# Patient Record
Sex: FEMALE | Race: WHITE | Employment: PART TIME | URBAN - METROPOLITAN AREA
[De-identification: names, ages, dates, MRNs, and addresses within clinical notes are randomized per-mention and may not be internally consistent; named-entity substitution may affect disease eponyms.]

---

## 2017-01-16 PROBLEM — R31.0 GROSS HEMATURIA: Status: ACTIVE | Noted: 2017-01-16

## 2017-01-16 PROBLEM — D49.4 BLADDER TUMOR: Status: ACTIVE | Noted: 2017-01-16

## 2017-02-23 PROBLEM — C67.2 MALIGNANT NEOPLASM OF LATERAL WALL OF URINARY BLADDER (HCC): Status: ACTIVE | Noted: 2017-02-23

## 2017-02-24 RX ORDER — LISINOPRIL 10 MG/1
10 TABLET ORAL 2 TIMES DAILY
COMMUNITY

## 2017-02-27 ENCOUNTER — ANESTHESIA EVENT (OUTPATIENT)
Dept: OPERATING ROOM | Age: 67
DRG: 658 | End: 2017-02-27
Payer: COMMERCIAL

## 2017-02-27 ENCOUNTER — ANESTHESIA (OUTPATIENT)
Dept: OPERATING ROOM | Age: 67
DRG: 658 | End: 2017-02-27
Payer: COMMERCIAL

## 2017-02-27 ENCOUNTER — HOSPITAL ENCOUNTER (INPATIENT)
Age: 67
LOS: 1 days | Discharge: HOME | DRG: 658 | End: 2017-02-28
Attending: UROLOGY | Admitting: UROLOGY
Payer: COMMERCIAL

## 2017-02-27 VITALS — SYSTOLIC BLOOD PRESSURE: 138 MMHG | DIASTOLIC BLOOD PRESSURE: 115 MMHG | OXYGEN SATURATION: 100 % | TEMPERATURE: 95.9 F

## 2017-02-27 LAB
ABO/RH: NORMAL
ALLEN TEST: ABNORMAL
ANION GAP SERPL CALCULATED.3IONS-SCNC: 13 MMOL/L (ref 9–17)
ANION GAP: 16 MMOL/L (ref 8–16)
ANTIBODY SCREEN: NEGATIVE
ARM BAND NUMBER: NORMAL
BUN BLDV-MCNC: 11 MG/DL (ref 8–23)
BUN/CREAT BLD: ABNORMAL (ref 9–20)
CALCIUM IONIZED: 1.35 MMOL/L (ref 1.13–1.33)
CALCIUM SERPL-MCNC: 9.3 MG/DL (ref 8.6–10.4)
CARBOXYHEMOGLOBIN: 1.2 % (ref 0–5)
CHLORIDE BLD-SCNC: 100 MMOL/L (ref 98–107)
CHLORIDE, WHOLE BLOOD: 106 MMOL/L (ref 98–110)
CO2: 23 MMOL/L (ref 20–31)
CREAT SERPL-MCNC: 0.73 MG/DL (ref 0.5–0.9)
EXPIRATION DATE: NORMAL
FIO2: ABNORMAL
GFR AFRICAN AMERICAN: >60 ML/MIN
GFR NON-AFRICAN AMERICAN: >60 ML/MIN
GFR SERPL CREATININE-BSD FRML MDRD: ABNORMAL ML/MIN/{1.73_M2}
GFR SERPL CREATININE-BSD FRML MDRD: ABNORMAL ML/MIN/{1.73_M2}
GLUCOSE BLD-MCNC: 133 MG/DL (ref 74–106)
GLUCOSE BLD-MCNC: 156 MG/DL (ref 65–105)
GLUCOSE BLD-MCNC: 186 MG/DL (ref 65–105)
GLUCOSE BLD-MCNC: 201 MG/DL (ref 70–99)
HCO3 ARTERIAL: 21.2 MMOL/L (ref 22–27)
HCT VFR BLD CALC: 33.4 %
HCT VFR BLD CALC: 35.1 % (ref 36–46)
HEMOGLOBIN: 10.8 GM/DL
HEMOGLOBIN: 11.9 G/DL (ref 12–16)
METHEMOGLOBIN: ABNORMAL % (ref 0–1.5)
MODE: ABNORMAL
NEGATIVE BASE EXCESS, ART: 2.3 MMOL/L (ref 0–2)
NOTIFICATION TIME: ABNORMAL
NOTIFICATION: ABNORMAL
O2 DEVICE/FLOW/%: ABNORMAL
O2 SAT, ARTERIAL: 99.5 % (ref 94–100)
OXYHEMOGLOBIN: ABNORMAL % (ref 95–98)
PATIENT TEMP: 37
PCO2 ARTERIAL: 33.8 MMHG (ref 32–45)
PCO2, ART, TEMP ADJ: ABNORMAL (ref 32–45)
PEEP/CPAP: ABNORMAL
PH ARTERIAL: 7.42 (ref 7.35–7.45)
PH, ART, TEMP ADJ: ABNORMAL (ref 7.35–7.45)
PO2 ARTERIAL: 186 MMHG (ref 75–95)
PO2, ART, TEMP ADJ: ABNORMAL MMHG (ref 75–95)
POC BUN: 13 MG/DL (ref 6–20)
POC CHLORIDE: 107 MMOL/L (ref 98–110)
POC CREATININE: 0.7 MG/DL (ref 0.6–1.4)
POC HEMATOCRIT: 43 % (ref 36–46)
POC HEMOGLOBIN: 14.6 GM/DL (ref 12–16)
POC POTASSIUM: 3.9 MMOL/L (ref 3.5–5.1)
POC SODIUM: 142 MMOL/L (ref 136–145)
POC TCO2: 23 MMOL/L (ref 20–31)
POSITIVE BASE EXCESS, ART: ABNORMAL MMOL/L (ref 0–2)
POTASSIUM SERPL-SCNC: 3.8 MMOL/L (ref 3.7–5.3)
POTASSIUM, WHOLE BLOOD: 4 MMOL/L (ref 3.6–5)
PSV: ABNORMAL
PT. POSITION: ABNORMAL
RESPIRATORY RATE: ABNORMAL
SAMPLE SITE: ABNORMAL
SET RATE: ABNORMAL
SODIUM BLD-SCNC: 136 MMOL/L (ref 135–144)
SODIUM, WHOLE BLOOD: 140 MMOL/L (ref 136–145)
TEXT FOR RESPIRATORY: ABNORMAL
TOTAL HB: ABNORMAL G/DL (ref 12–16)
TOTAL RATE: ABNORMAL
VT: ABNORMAL

## 2017-02-27 PROCEDURE — 6360000002 HC RX W HCPCS: Performed by: NURSE ANESTHETIST, CERTIFIED REGISTERED

## 2017-02-27 PROCEDURE — 3700000001 HC ADD 15 MINUTES (ANESTHESIA): Performed by: UROLOGY

## 2017-02-27 PROCEDURE — 7100000001 HC PACU RECOVERY - ADDTL 15 MIN: Performed by: UROLOGY

## 2017-02-27 PROCEDURE — 3700000000 HC ANESTHESIA ATTENDED CARE: Performed by: UROLOGY

## 2017-02-27 PROCEDURE — 2500000003 HC RX 250 WO HCPCS: Performed by: NURSE ANESTHETIST, CERTIFIED REGISTERED

## 2017-02-27 PROCEDURE — 85014 HEMATOCRIT: CPT

## 2017-02-27 PROCEDURE — 2580000003 HC RX 258: Performed by: UROLOGY

## 2017-02-27 PROCEDURE — 88307 TISSUE EXAM BY PATHOLOGIST: CPT

## 2017-02-27 PROCEDURE — 80048 BASIC METABOLIC PNL TOTAL CA: CPT

## 2017-02-27 PROCEDURE — 87086 URINE CULTURE/COLONY COUNT: CPT

## 2017-02-27 PROCEDURE — 85018 HEMOGLOBIN: CPT

## 2017-02-27 PROCEDURE — 2580000003 HC RX 258: Performed by: NURSE ANESTHETIST, CERTIFIED REGISTERED

## 2017-02-27 PROCEDURE — 2500000003 HC RX 250 WO HCPCS

## 2017-02-27 PROCEDURE — 84132 ASSAY OF SERUM POTASSIUM: CPT

## 2017-02-27 PROCEDURE — 86900 BLOOD TYPING SEROLOGIC ABO: CPT

## 2017-02-27 PROCEDURE — 86850 RBC ANTIBODY SCREEN: CPT

## 2017-02-27 PROCEDURE — 88313 SPECIAL STAINS GROUP 2: CPT

## 2017-02-27 PROCEDURE — 3600000009 HC SURGERY ROBOT BASE: Performed by: UROLOGY

## 2017-02-27 PROCEDURE — 6360000002 HC RX W HCPCS: Performed by: UROLOGY

## 2017-02-27 PROCEDURE — 6360000002 HC RX W HCPCS

## 2017-02-27 PROCEDURE — 3600000019 HC SURGERY ROBOT ADDTL 15MIN: Performed by: UROLOGY

## 2017-02-27 PROCEDURE — 6360000002 HC RX W HCPCS: Performed by: ANESTHESIOLOGY

## 2017-02-27 PROCEDURE — 88331 PATH CONSLTJ SURG 1 BLK 1SPC: CPT

## 2017-02-27 PROCEDURE — 84520 ASSAY OF UREA NITROGEN: CPT

## 2017-02-27 PROCEDURE — 1200000000 HC SEMI PRIVATE

## 2017-02-27 PROCEDURE — 2720000003 HC MISC SUTURE/STAPLES/RELOADS/ETC: Performed by: UROLOGY

## 2017-02-27 PROCEDURE — 82947 ASSAY GLUCOSE BLOOD QUANT: CPT

## 2017-02-27 PROCEDURE — 82330 ASSAY OF CALCIUM: CPT

## 2017-02-27 PROCEDURE — 80051 ELECTROLYTE PANEL: CPT

## 2017-02-27 PROCEDURE — 6370000000 HC RX 637 (ALT 250 FOR IP): Performed by: UROLOGY

## 2017-02-27 PROCEDURE — 86901 BLOOD TYPING SEROLOGIC RH(D): CPT

## 2017-02-27 PROCEDURE — 7100000000 HC PACU RECOVERY - FIRST 15 MIN: Performed by: UROLOGY

## 2017-02-27 PROCEDURE — 82805 BLOOD GASES W/O2 SATURATION: CPT

## 2017-02-27 PROCEDURE — 82565 ASSAY OF CREATININE: CPT

## 2017-02-27 PROCEDURE — 2580000003 HC RX 258: Performed by: ANESTHESIOLOGY

## 2017-02-27 PROCEDURE — 2500000003 HC RX 250 WO HCPCS: Performed by: UROLOGY

## 2017-02-27 PROCEDURE — 2780000010 HC IMPLANT OTHER: Performed by: UROLOGY

## 2017-02-27 RX ORDER — SODIUM CHLORIDE 0.9 % (FLUSH) 0.9 %
10 SYRINGE (ML) INJECTION PRN
Status: DISCONTINUED | OUTPATIENT
Start: 2017-02-27 | End: 2017-02-28 | Stop reason: HOSPADM

## 2017-02-27 RX ORDER — LISINOPRIL 10 MG/1
10 TABLET ORAL 2 TIMES DAILY
Status: DISCONTINUED | OUTPATIENT
Start: 2017-02-27 | End: 2017-02-28 | Stop reason: HOSPADM

## 2017-02-27 RX ORDER — PROPOFOL 10 MG/ML
INJECTION, EMULSION INTRAVENOUS PRN
Status: DISCONTINUED | OUTPATIENT
Start: 2017-02-27 | End: 2017-02-27 | Stop reason: SDUPTHER

## 2017-02-27 RX ORDER — OXYCODONE HYDROCHLORIDE AND ACETAMINOPHEN 5; 325 MG/1; MG/1
2 TABLET ORAL EVERY 4 HOURS PRN
Status: DISCONTINUED | OUTPATIENT
Start: 2017-02-27 | End: 2017-02-28 | Stop reason: HOSPADM

## 2017-02-27 RX ORDER — MORPHINE SULFATE 4 MG/ML
4 INJECTION, SOLUTION INTRAMUSCULAR; INTRAVENOUS
Status: DISCONTINUED | OUTPATIENT
Start: 2017-02-27 | End: 2017-02-28 | Stop reason: HOSPADM

## 2017-02-27 RX ORDER — WATER 1000 ML/1000ML
INJECTION, SOLUTION INTRAVENOUS PRN
Status: DISCONTINUED | OUTPATIENT
Start: 2017-02-27 | End: 2017-02-27 | Stop reason: HOSPADM

## 2017-02-27 RX ORDER — OXYCODONE HYDROCHLORIDE AND ACETAMINOPHEN 5; 325 MG/1; MG/1
1 TABLET ORAL EVERY 4 HOURS PRN
Status: DISCONTINUED | OUTPATIENT
Start: 2017-02-27 | End: 2017-02-28 | Stop reason: HOSPADM

## 2017-02-27 RX ORDER — ACETAMINOPHEN 325 MG/1
650 TABLET ORAL EVERY 4 HOURS PRN
Status: DISCONTINUED | OUTPATIENT
Start: 2017-02-27 | End: 2017-02-28 | Stop reason: HOSPADM

## 2017-02-27 RX ORDER — SODIUM CHLORIDE, SODIUM LACTATE, POTASSIUM CHLORIDE, CALCIUM CHLORIDE 600; 310; 30; 20 MG/100ML; MG/100ML; MG/100ML; MG/100ML
INJECTION, SOLUTION INTRAVENOUS CONTINUOUS PRN
Status: DISCONTINUED | OUTPATIENT
Start: 2017-02-27 | End: 2017-02-27 | Stop reason: SDUPTHER

## 2017-02-27 RX ORDER — SODIUM CHLORIDE, SODIUM LACTATE, POTASSIUM CHLORIDE, CALCIUM CHLORIDE 600; 310; 30; 20 MG/100ML; MG/100ML; MG/100ML; MG/100ML
INJECTION, SOLUTION INTRAVENOUS CONTINUOUS
Status: DISCONTINUED | OUTPATIENT
Start: 2017-02-27 | End: 2017-02-27

## 2017-02-27 RX ORDER — ONDANSETRON 2 MG/ML
INJECTION INTRAMUSCULAR; INTRAVENOUS PRN
Status: DISCONTINUED | OUTPATIENT
Start: 2017-02-27 | End: 2017-02-27 | Stop reason: SDUPTHER

## 2017-02-27 RX ORDER — MIDAZOLAM HYDROCHLORIDE 1 MG/ML
2 INJECTION INTRAMUSCULAR; INTRAVENOUS ONCE
Status: COMPLETED | OUTPATIENT
Start: 2017-02-27 | End: 2017-02-27

## 2017-02-27 RX ORDER — MORPHINE SULFATE 2 MG/ML
2 INJECTION, SOLUTION INTRAMUSCULAR; INTRAVENOUS
Status: DISCONTINUED | OUTPATIENT
Start: 2017-02-27 | End: 2017-02-28 | Stop reason: HOSPADM

## 2017-02-27 RX ORDER — FAMOTIDINE 20 MG/1
20 TABLET, FILM COATED ORAL 2 TIMES DAILY
Status: DISCONTINUED | OUTPATIENT
Start: 2017-02-27 | End: 2017-02-28 | Stop reason: HOSPADM

## 2017-02-27 RX ORDER — ROCURONIUM BROMIDE 10 MG/ML
INJECTION, SOLUTION INTRAVENOUS PRN
Status: DISCONTINUED | OUTPATIENT
Start: 2017-02-27 | End: 2017-02-27 | Stop reason: SDUPTHER

## 2017-02-27 RX ORDER — MIDAZOLAM HYDROCHLORIDE 1 MG/ML
INJECTION INTRAMUSCULAR; INTRAVENOUS
Status: COMPLETED
Start: 2017-02-27 | End: 2017-02-27

## 2017-02-27 RX ORDER — HYDRALAZINE HYDROCHLORIDE 20 MG/ML
10 INJECTION INTRAMUSCULAR; INTRAVENOUS EVERY 6 HOURS PRN
Status: DISCONTINUED | OUTPATIENT
Start: 2017-02-27 | End: 2017-02-28 | Stop reason: HOSPADM

## 2017-02-27 RX ORDER — LIDOCAINE HYDROCHLORIDE 10 MG/ML
INJECTION, SOLUTION EPIDURAL; INFILTRATION; INTRACAUDAL; PERINEURAL PRN
Status: DISCONTINUED | OUTPATIENT
Start: 2017-02-27 | End: 2017-02-27 | Stop reason: SDUPTHER

## 2017-02-27 RX ORDER — GLYCOPYRROLATE 0.2 MG/ML
INJECTION INTRAMUSCULAR; INTRAVENOUS PRN
Status: DISCONTINUED | OUTPATIENT
Start: 2017-02-27 | End: 2017-02-27 | Stop reason: SDUPTHER

## 2017-02-27 RX ORDER — LABETALOL HYDROCHLORIDE 5 MG/ML
INJECTION, SOLUTION INTRAVENOUS PRN
Status: DISCONTINUED | OUTPATIENT
Start: 2017-02-27 | End: 2017-02-27 | Stop reason: SDUPTHER

## 2017-02-27 RX ORDER — SODIUM CHLORIDE 9 MG/ML
INJECTION, SOLUTION INTRAVENOUS CONTINUOUS
Status: DISCONTINUED | OUTPATIENT
Start: 2017-02-27 | End: 2017-02-28

## 2017-02-27 RX ORDER — SODIUM CHLORIDE 0.9 % (FLUSH) 0.9 %
10 SYRINGE (ML) INJECTION EVERY 12 HOURS SCHEDULED
Status: DISCONTINUED | OUTPATIENT
Start: 2017-02-27 | End: 2017-02-28 | Stop reason: HOSPADM

## 2017-02-27 RX ORDER — FENTANYL CITRATE 50 UG/ML
INJECTION, SOLUTION INTRAMUSCULAR; INTRAVENOUS PRN
Status: DISCONTINUED | OUTPATIENT
Start: 2017-02-27 | End: 2017-02-27 | Stop reason: SDUPTHER

## 2017-02-27 RX ORDER — UREA 10 %
10 LOTION (ML) TOPICAL NIGHTLY
Status: DISCONTINUED | OUTPATIENT
Start: 2017-02-27 | End: 2017-02-28 | Stop reason: HOSPADM

## 2017-02-27 RX ORDER — DEXAMETHASONE SODIUM PHOSPHATE 10 MG/ML
INJECTION INTRAMUSCULAR; INTRAVENOUS PRN
Status: DISCONTINUED | OUTPATIENT
Start: 2017-02-27 | End: 2017-02-27 | Stop reason: SDUPTHER

## 2017-02-27 RX ORDER — ONDANSETRON 2 MG/ML
4 INJECTION INTRAMUSCULAR; INTRAVENOUS EVERY 6 HOURS PRN
Status: DISCONTINUED | OUTPATIENT
Start: 2017-02-27 | End: 2017-02-28 | Stop reason: HOSPADM

## 2017-02-27 RX ORDER — DOCUSATE SODIUM 100 MG/1
100 CAPSULE, LIQUID FILLED ORAL 2 TIMES DAILY
Status: DISCONTINUED | OUTPATIENT
Start: 2017-02-27 | End: 2017-02-28 | Stop reason: HOSPADM

## 2017-02-27 RX ADMIN — FENTANYL CITRATE 50 MCG: 50 INJECTION INTRAMUSCULAR; INTRAVENOUS at 11:35

## 2017-02-27 RX ADMIN — SODIUM CHLORIDE, POTASSIUM CHLORIDE, SODIUM LACTATE AND CALCIUM CHLORIDE: 600; 310; 30; 20 INJECTION, SOLUTION INTRAVENOUS at 07:44

## 2017-02-27 RX ADMIN — LABETALOL HYDROCHLORIDE 5 MG: 5 INJECTION, SOLUTION INTRAVENOUS at 08:47

## 2017-02-27 RX ADMIN — SODIUM CHLORIDE: 9 INJECTION, SOLUTION INTRAVENOUS at 15:15

## 2017-02-27 RX ADMIN — GLYCOPYRROLATE 0.2 MG: 0.2 INJECTION INTRAMUSCULAR; INTRAVENOUS at 08:26

## 2017-02-27 RX ADMIN — PHENYLEPHRINE HYDROCHLORIDE 100 MCG: 10 INJECTION INTRAMUSCULAR; INTRAVENOUS; SUBCUTANEOUS at 08:26

## 2017-02-27 RX ADMIN — LISINOPRIL 10 MG: 10 TABLET ORAL at 20:46

## 2017-02-27 RX ADMIN — MIDAZOLAM HYDROCHLORIDE 2 MG: 1 INJECTION, SOLUTION INTRAMUSCULAR; INTRAVENOUS at 07:52

## 2017-02-27 RX ADMIN — SODIUM CHLORIDE, POTASSIUM CHLORIDE, SODIUM LACTATE AND CALCIUM CHLORIDE: 600; 310; 30; 20 INJECTION, SOLUTION INTRAVENOUS at 09:30

## 2017-02-27 RX ADMIN — MIDAZOLAM HYDROCHLORIDE 2 MG: 1 INJECTION INTRAMUSCULAR; INTRAVENOUS at 07:52

## 2017-02-27 RX ADMIN — ROCURONIUM BROMIDE 20 MG: 10 INJECTION, SOLUTION INTRAVENOUS at 12:00

## 2017-02-27 RX ADMIN — ROCURONIUM BROMIDE 10 MG: 10 INJECTION, SOLUTION INTRAVENOUS at 10:27

## 2017-02-27 RX ADMIN — LIDOCAINE HYDROCHLORIDE 50 MG: 10 INJECTION, SOLUTION EPIDURAL; INFILTRATION; INTRACAUDAL; PERINEURAL at 08:05

## 2017-02-27 RX ADMIN — ROCURONIUM BROMIDE 50 MG: 10 INJECTION, SOLUTION INTRAVENOUS at 08:05

## 2017-02-27 RX ADMIN — ROCURONIUM BROMIDE 20 MG: 10 INJECTION, SOLUTION INTRAVENOUS at 09:04

## 2017-02-27 RX ADMIN — Medication 2 G: at 08:18

## 2017-02-27 RX ADMIN — ROCURONIUM BROMIDE 10 MG: 10 INJECTION, SOLUTION INTRAVENOUS at 09:40

## 2017-02-27 RX ADMIN — FENTANYL CITRATE 100 MCG: 50 INJECTION INTRAMUSCULAR; INTRAVENOUS at 10:37

## 2017-02-27 RX ADMIN — PROPOFOL 50 MG: 10 INJECTION, EMULSION INTRAVENOUS at 08:08

## 2017-02-27 RX ADMIN — PHENYLEPHRINE HYDROCHLORIDE 100 MCG: 10 INJECTION INTRAMUSCULAR; INTRAVENOUS; SUBCUTANEOUS at 08:35

## 2017-02-27 RX ADMIN — ONDANSETRON 4 MG: 2 INJECTION, SOLUTION INTRAMUSCULAR; INTRAVENOUS at 18:22

## 2017-02-27 RX ADMIN — Medication 2 G: at 12:18

## 2017-02-27 RX ADMIN — SODIUM CHLORIDE, POTASSIUM CHLORIDE, SODIUM LACTATE AND CALCIUM CHLORIDE: 600; 310; 30; 20 INJECTION, SOLUTION INTRAVENOUS at 11:20

## 2017-02-27 RX ADMIN — FENTANYL CITRATE 150 MCG: 50 INJECTION INTRAMUSCULAR; INTRAVENOUS at 08:12

## 2017-02-27 RX ADMIN — GLYCOPYRROLATE 0.5 MG: 0.2 INJECTION INTRAMUSCULAR; INTRAVENOUS at 13:03

## 2017-02-27 RX ADMIN — FENTANYL CITRATE 100 MCG: 50 INJECTION INTRAMUSCULAR; INTRAVENOUS at 08:05

## 2017-02-27 RX ADMIN — LABETALOL HYDROCHLORIDE 5 MG: 5 INJECTION, SOLUTION INTRAVENOUS at 08:49

## 2017-02-27 RX ADMIN — DEXAMETHASONE SODIUM PHOSPHATE 10 MG: 10 INJECTION INTRAMUSCULAR; INTRAVENOUS at 08:45

## 2017-02-27 RX ADMIN — SODIUM CHLORIDE, POTASSIUM CHLORIDE, SODIUM LACTATE AND CALCIUM CHLORIDE: 600; 310; 30; 20 INJECTION, SOLUTION INTRAVENOUS at 10:15

## 2017-02-27 RX ADMIN — LABETALOL HYDROCHLORIDE 5 MG: 5 INJECTION, SOLUTION INTRAVENOUS at 11:45

## 2017-02-27 RX ADMIN — MORPHINE SULFATE 2 MG: 2 INJECTION, SOLUTION INTRAMUSCULAR; INTRAVENOUS at 18:33

## 2017-02-27 RX ADMIN — LABETALOL HYDROCHLORIDE 5 MG: 5 INJECTION, SOLUTION INTRAVENOUS at 11:40

## 2017-02-27 RX ADMIN — ENOXAPARIN SODIUM 40 MG: 40 INJECTION SUBCUTANEOUS at 07:08

## 2017-02-27 RX ADMIN — ONDANSETRON 4 MG: 2 INJECTION, SOLUTION INTRAMUSCULAR; INTRAVENOUS at 12:56

## 2017-02-27 RX ADMIN — PROPOFOL 150 MG: 10 INJECTION, EMULSION INTRAVENOUS at 08:05

## 2017-02-27 RX ADMIN — SODIUM CHLORIDE, POTASSIUM CHLORIDE, SODIUM LACTATE AND CALCIUM CHLORIDE: 600; 310; 30; 20 INJECTION, SOLUTION INTRAVENOUS at 08:20

## 2017-02-27 RX ADMIN — GLYCOPYRROLATE 0.2 MG: 0.2 INJECTION INTRAMUSCULAR; INTRAVENOUS at 08:45

## 2017-02-27 RX ADMIN — Medication 2 G: at 20:46

## 2017-02-27 RX ADMIN — HYDRALAZINE HYDROCHLORIDE 10 MG: 20 INJECTION INTRAMUSCULAR; INTRAVENOUS at 18:33

## 2017-02-27 RX ADMIN — HYDROMORPHONE HYDROCHLORIDE 0.5 MG: 1 INJECTION, SOLUTION INTRAMUSCULAR; INTRAVENOUS; SUBCUTANEOUS at 14:08

## 2017-02-27 RX ADMIN — HYDROMORPHONE HYDROCHLORIDE 0.5 MG: 1 INJECTION, SOLUTION INTRAMUSCULAR; INTRAVENOUS; SUBCUTANEOUS at 13:49

## 2017-02-27 RX ADMIN — ROCURONIUM BROMIDE 10 MG: 10 INJECTION, SOLUTION INTRAVENOUS at 11:25

## 2017-02-27 RX ADMIN — SODIUM CHLORIDE, POTASSIUM CHLORIDE, SODIUM LACTATE AND CALCIUM CHLORIDE: 600; 310; 30; 20 INJECTION, SOLUTION INTRAVENOUS at 09:40

## 2017-02-27 RX ADMIN — NEOSTIGMINE METHYLSULFATE 3 MG: 1 INJECTION, SOLUTION INTRAMUSCULAR; INTRAVENOUS; SUBCUTANEOUS at 13:04

## 2017-02-27 RX ADMIN — FENTANYL CITRATE 100 MCG: 50 INJECTION INTRAMUSCULAR; INTRAVENOUS at 11:05

## 2017-02-27 RX ADMIN — SODIUM CHLORIDE, POTASSIUM CHLORIDE, SODIUM LACTATE AND CALCIUM CHLORIDE: 600; 310; 30; 20 INJECTION, SOLUTION INTRAVENOUS at 07:58

## 2017-02-27 RX ADMIN — ROCURONIUM BROMIDE 10 MG: 10 INJECTION, SOLUTION INTRAVENOUS at 09:53

## 2017-02-27 RX ADMIN — SODIUM CHLORIDE, PRESERVATIVE FREE 10 ML: 5 INJECTION INTRAVENOUS at 21:03

## 2017-02-27 ASSESSMENT — PAIN SCALES - GENERAL
PAINLEVEL_OUTOF10: 4
PAINLEVEL_OUTOF10: 4
PAINLEVEL_OUTOF10: 0
PAINLEVEL_OUTOF10: 3
PAINLEVEL_OUTOF10: 6
PAINLEVEL_OUTOF10: 0
PAINLEVEL_OUTOF10: 6

## 2017-02-27 ASSESSMENT — PAIN SCALES - WONG BAKER
WONGBAKER_NUMERICALRESPONSE: 0
WONGBAKER_NUMERICALRESPONSE: 0

## 2017-02-27 ASSESSMENT — PAIN - FUNCTIONAL ASSESSMENT: PAIN_FUNCTIONAL_ASSESSMENT: 0-10

## 2017-02-28 VITALS
BODY MASS INDEX: 29.66 KG/M2 | DIASTOLIC BLOOD PRESSURE: 69 MMHG | WEIGHT: 178 LBS | SYSTOLIC BLOOD PRESSURE: 134 MMHG | HEART RATE: 69 BPM | TEMPERATURE: 98.1 F | HEIGHT: 65 IN | RESPIRATION RATE: 16 BRPM | OXYGEN SATURATION: 97 %

## 2017-02-28 LAB
ANION GAP SERPL CALCULATED.3IONS-SCNC: 15 MMOL/L (ref 9–17)
BUN BLDV-MCNC: 12 MG/DL (ref 8–23)
BUN/CREAT BLD: ABNORMAL (ref 9–20)
CALCIUM SERPL-MCNC: 9 MG/DL (ref 8.6–10.4)
CHLORIDE BLD-SCNC: 101 MMOL/L (ref 98–107)
CO2: 21 MMOL/L (ref 20–31)
CREAT SERPL-MCNC: 1.05 MG/DL (ref 0.5–0.9)
CULTURE: NO GROWTH
CULTURE: NORMAL
GFR AFRICAN AMERICAN: >60 ML/MIN
GFR NON-AFRICAN AMERICAN: 52 ML/MIN
GFR SERPL CREATININE-BSD FRML MDRD: ABNORMAL ML/MIN/{1.73_M2}
GFR SERPL CREATININE-BSD FRML MDRD: ABNORMAL ML/MIN/{1.73_M2}
GLUCOSE BLD-MCNC: 130 MG/DL (ref 70–99)
HCT VFR BLD CALC: 33.6 % (ref 36–46)
HEMOGLOBIN: 11.4 G/DL (ref 12–16)
Lab: NORMAL
MCH RBC QN AUTO: 28.8 PG (ref 26–34)
MCHC RBC AUTO-ENTMCNC: 33.9 G/DL (ref 31–37)
MCV RBC AUTO: 84.8 FL (ref 80–100)
PDW BLD-RTO: 14.1 % (ref 12.5–15.4)
PLATELET # BLD: 199 K/UL (ref 140–450)
PMV BLD AUTO: 8.4 FL (ref 6–12)
POTASSIUM SERPL-SCNC: 3.8 MMOL/L (ref 3.7–5.3)
RBC # BLD: 3.96 M/UL (ref 4–5.2)
SODIUM BLD-SCNC: 137 MMOL/L (ref 135–144)
SPECIMEN DESCRIPTION: NORMAL
STATUS: NORMAL
WBC # BLD: 12.1 K/UL (ref 3.5–11)

## 2017-02-28 PROCEDURE — 6360000002 HC RX W HCPCS: Performed by: UROLOGY

## 2017-02-28 PROCEDURE — 80048 BASIC METABOLIC PNL TOTAL CA: CPT

## 2017-02-28 PROCEDURE — G8979 MOBILITY GOAL STATUS: HCPCS

## 2017-02-28 PROCEDURE — 8E0W4CZ ROBOTIC ASSISTED PROCEDURE OF TRUNK REGION, PERCUTANEOUS ENDOSCOPIC APPROACH: ICD-10-PCS | Performed by: UROLOGY

## 2017-02-28 PROCEDURE — 2580000003 HC RX 258: Performed by: UROLOGY

## 2017-02-28 PROCEDURE — 0TB64ZZ EXCISION OF RIGHT URETER, PERCUTANEOUS ENDOSCOPIC APPROACH: ICD-10-PCS | Performed by: UROLOGY

## 2017-02-28 PROCEDURE — 97530 THERAPEUTIC ACTIVITIES: CPT

## 2017-02-28 PROCEDURE — 36415 COLL VENOUS BLD VENIPUNCTURE: CPT

## 2017-02-28 PROCEDURE — 85027 COMPLETE CBC AUTOMATED: CPT

## 2017-02-28 PROCEDURE — 97161 PT EVAL LOW COMPLEX 20 MIN: CPT

## 2017-02-28 PROCEDURE — 0TT04ZZ RESECTION OF RIGHT KIDNEY, PERCUTANEOUS ENDOSCOPIC APPROACH: ICD-10-PCS | Performed by: UROLOGY

## 2017-02-28 PROCEDURE — 6370000000 HC RX 637 (ALT 250 FOR IP): Performed by: UROLOGY

## 2017-02-28 PROCEDURE — G8980 MOBILITY D/C STATUS: HCPCS

## 2017-02-28 PROCEDURE — G8978 MOBILITY CURRENT STATUS: HCPCS

## 2017-02-28 RX ORDER — DOCUSATE SODIUM 100 MG/1
100 CAPSULE, LIQUID FILLED ORAL 2 TIMES DAILY
Qty: 14 CAPSULE | Refills: 0 | Status: SHIPPED | OUTPATIENT
Start: 2017-02-28

## 2017-02-28 RX ORDER — OXYCODONE HYDROCHLORIDE AND ACETAMINOPHEN 5; 325 MG/1; MG/1
1 TABLET ORAL EVERY 6 HOURS PRN
Qty: 20 TABLET | Refills: 0 | Status: SHIPPED | OUTPATIENT
Start: 2017-02-28

## 2017-02-28 RX ADMIN — Medication 2 G: at 04:10

## 2017-02-28 RX ADMIN — SODIUM CHLORIDE: 9 INJECTION, SOLUTION INTRAVENOUS at 08:00

## 2017-02-28 RX ADMIN — DOCUSATE SODIUM 100 MG: 100 CAPSULE ORAL at 07:59

## 2017-02-28 RX ADMIN — FAMOTIDINE 20 MG: 20 TABLET, FILM COATED ORAL at 07:59

## 2017-02-28 RX ADMIN — LISINOPRIL 10 MG: 10 TABLET ORAL at 07:59

## 2017-02-28 RX ADMIN — OXYCODONE HYDROCHLORIDE AND ACETAMINOPHEN 1 TABLET: 5; 325 TABLET ORAL at 09:45

## 2017-02-28 ASSESSMENT — PAIN DESCRIPTION - LOCATION: LOCATION: VAGINA

## 2017-02-28 ASSESSMENT — PAIN SCALES - GENERAL
PAINLEVEL_OUTOF10: 2
PAINLEVEL_OUTOF10: 4
PAINLEVEL_OUTOF10: 2
PAINLEVEL_OUTOF10: 3

## 2017-02-28 ASSESSMENT — PAIN DESCRIPTION - PAIN TYPE: TYPE: SURGICAL PAIN

## 2017-02-28 ASSESSMENT — PAIN DESCRIPTION - FREQUENCY: FREQUENCY: INTERMITTENT

## 2017-03-01 LAB — SURGICAL PATHOLOGY REPORT: NORMAL

## 2017-04-21 PROBLEM — C64.1 MALIGNANT NEOPLASM OF RIGHT KIDNEY (HCC): Status: ACTIVE | Noted: 2017-04-21

## 2017-06-12 ENCOUNTER — HOSPITAL ENCOUNTER (OUTPATIENT)
Age: 67
Discharge: HOME | End: 2017-06-12
Payer: COMMERCIAL

## 2017-06-12 ENCOUNTER — HOSPITAL ENCOUNTER (OUTPATIENT)
Age: 67
Discharge: HOME | End: 2017-06-12
Payer: MEDICARE

## 2017-06-12 PROCEDURE — 82947 ASSAY GLUCOSE BLOOD QUANT: CPT

## 2017-06-12 PROCEDURE — 88305 TISSUE EXAM BY PATHOLOGIST: CPT

## 2017-06-13 LAB — GLUCOSE BLD-MCNC: 97 MG/DL (ref 65–105)

## 2017-06-14 LAB — SURGICAL PATHOLOGY REPORT: NORMAL

## 2018-07-09 ENCOUNTER — HOSPITAL ENCOUNTER (OUTPATIENT)
Age: 68
Setting detail: SPECIMEN
Discharge: HOME | End: 2018-07-09
Payer: COMMERCIAL

## 2018-07-09 LAB
CREAT SERPL-MCNC: 0.95 MG/DL (ref 0.5–0.9)
GFR AFRICAN AMERICAN: >60 ML/MIN
GFR NON-AFRICAN AMERICAN: 58 ML/MIN
GFR SERPL CREATININE-BSD FRML MDRD: ABNORMAL ML/MIN/{1.73_M2}
GFR SERPL CREATININE-BSD FRML MDRD: ABNORMAL ML/MIN/{1.73_M2}

## 2018-08-27 ENCOUNTER — OFFICE VISIT (OUTPATIENT)
Dept: ORTHOPEDIC SURGERY | Age: 68
End: 2018-08-27
Payer: COMMERCIAL

## 2018-08-27 VITALS
DIASTOLIC BLOOD PRESSURE: 84 MMHG | SYSTOLIC BLOOD PRESSURE: 170 MMHG | HEIGHT: 66 IN | HEART RATE: 76 BPM | BODY MASS INDEX: 29.73 KG/M2 | WEIGHT: 185 LBS

## 2018-08-27 DIAGNOSIS — M17.10 ARTHRITIS OF KNEE: Primary | ICD-10-CM

## 2018-08-27 PROCEDURE — 20610 DRAIN/INJ JOINT/BURSA W/O US: CPT | Performed by: ORTHOPAEDIC SURGERY

## 2018-08-27 PROCEDURE — 99213 OFFICE O/P EST LOW 20 MIN: CPT | Performed by: ORTHOPAEDIC SURGERY

## 2018-08-27 RX ORDER — LISINOPRIL 20 MG/1
TABLET ORAL
COMMUNITY
Start: 2018-06-21

## 2018-08-27 RX ORDER — LANOLIN ALCOHOL/MO/W.PET/CERES
100 CREAM (GRAM) TOPICAL
COMMUNITY

## 2018-08-27 RX ORDER — CLOBETASOL PROPIONATE 0.5 MG/G
CREAM TOPICAL
COMMUNITY

## 2018-08-27 RX ORDER — BETAMETHASONE SODIUM PHOSPHATE AND BETAMETHASONE ACETATE 3; 3 MG/ML; MG/ML
12 INJECTION, SUSPENSION INTRA-ARTICULAR; INTRALESIONAL; INTRAMUSCULAR; SOFT TISSUE ONCE
Status: COMPLETED | OUTPATIENT
Start: 2018-08-27 | End: 2018-08-27

## 2018-08-27 RX ORDER — METFORMIN HYDROCHLORIDE 500 MG/1
TABLET, EXTENDED RELEASE ORAL
COMMUNITY
Start: 2018-06-21

## 2018-08-27 RX ORDER — DOCUSATE SODIUM 100 MG/1
CAPSULE, LIQUID FILLED ORAL
COMMUNITY

## 2018-08-27 RX ORDER — NAFTIFINE HYDROCHLORIDE 1 MG/G
CREAM TOPICAL
COMMUNITY

## 2018-08-27 RX ORDER — BUPIVACAINE HYDROCHLORIDE 5 MG/ML
30 INJECTION, SOLUTION PERINEURAL ONCE
Status: COMPLETED | OUTPATIENT
Start: 2018-08-27 | End: 2018-08-27

## 2018-08-27 RX ADMIN — BUPIVACAINE HYDROCHLORIDE 150 MG: 5 INJECTION, SOLUTION PERINEURAL at 16:49

## 2018-08-27 RX ADMIN — BETAMETHASONE SODIUM PHOSPHATE AND BETAMETHASONE ACETATE 12 MG: 3; 3 INJECTION, SUSPENSION INTRA-ARTICULAR; INTRALESIONAL; INTRAMUSCULAR; SOFT TISSUE at 16:49

## 2018-08-27 NOTE — PROGRESS NOTES
Subjective:      Patient ID: Fazal Pierce is a 77 y.o. female. HPI  Patient presents today with 2 issues first being her right knee. She has about a month or so history of pain related to the right knee. She's had knee pain in the past we have injected her about a year and a half ago she did very well with it. Having pain over the medial aspect of the knee worse with weightbearing she said it was quite swollen and it is doing better now. Also dropped a plate directly onto her left great toe she said this was about 4 weeks ago. Had a lot of swelling and pain but doing much better now. Current Outpatient Prescriptions   Medication Sig Dispense Refill    clobetasol (TEMOVATE) 0.05 % cream Apply topically      vitamin B-12 (CYANOCOBALAMIN) 1000 MCG tablet Take 100 mcg by mouth      docusate sodium (COLACE) 100 MG capsule Take by mouth      lisinopril (PRINIVIL;ZESTRIL) 20 MG tablet       metFORMIN (GLUCOPHAGE-XR) 500 MG extended release tablet       naftifine (NAFTIN) 1 % cream Apply topically      docusate sodium (COLACE) 100 MG capsule Take 1 capsule by mouth 2 times daily 14 capsule 0    oxyCODONE-acetaminophen (PERCOCET) 5-325 MG per tablet Take 1 tablet by mouth every 6 hours as needed for Pain . 20 tablet 0    lisinopril (PRINIVIL;ZESTRIL) 10 MG tablet Take 10 mg by mouth 2 times daily      docusate sodium (COLACE) 100 MG capsule Take 1 capsule by mouth daily as needed for Constipation (Patient taking differently: Take 100 mg by mouth nightly ) 40 capsule 1    eszopiclone (LUNESTA) 2 MG TABS Take 2 mg by mouth nightly as needed      Melatonin 10 MG TABS Take 10 mg by mouth nightly       metFORMIN (GLUCOPHAGE) 500 MG tablet Take 500 mg by mouth Daily with supper       Cholecalciferol (VITAMIN D-3 PO) Take 2,000 Units by mouth nightly        No current facility-administered medications for this visit.       Review of Systems   Musculoskeletal: Positive for arthralgias, gait problem, joint the metatarsal phalangeal joint. There is limited range of motion. Assessment:     Visit Diagnoses       Codes    Arthritis of knee    -  Primary ICD-10-CM: M17.10  ICD-9-CM: 716.96           Plan:     Patient wanted try another injection for her right knee after sterile prep injected the knee with 3 mL local 2 mL Celestone . She tolerated this well. I like to see her back in 2 weeks. If she's not improved with either the knee or toe we'll consider x-ray.     Electronically signed by Pop Jones MD on 8/27/2018 at 4:19 PM

## 2018-09-17 ENCOUNTER — OFFICE VISIT (OUTPATIENT)
Dept: ORTHOPEDIC SURGERY | Age: 68
End: 2018-09-17
Payer: COMMERCIAL

## 2018-09-17 VITALS
HEART RATE: 69 BPM | DIASTOLIC BLOOD PRESSURE: 85 MMHG | WEIGHT: 185 LBS | SYSTOLIC BLOOD PRESSURE: 191 MMHG | HEIGHT: 66 IN | BODY MASS INDEX: 29.73 KG/M2

## 2018-09-17 DIAGNOSIS — M17.10 ARTHRITIS OF KNEE: Primary | ICD-10-CM

## 2018-09-17 PROCEDURE — 99213 OFFICE O/P EST LOW 20 MIN: CPT | Performed by: ORTHOPAEDIC SURGERY

## 2018-09-17 NOTE — PROGRESS NOTES
Subjective:      Patient ID: Angel Weaver is a 77 y.o. female. HPI  Patient returns for recheck of her right knee. She reports with the injection it's doing significantly better. She's noticed less pain and better range of motion. She has started to try her daily walking. Current Outpatient Prescriptions   Medication Sig Dispense Refill    clobetasol (TEMOVATE) 0.05 % cream Apply topically      vitamin B-12 (CYANOCOBALAMIN) 1000 MCG tablet Take 100 mcg by mouth      docusate sodium (COLACE) 100 MG capsule Take by mouth      lisinopril (PRINIVIL;ZESTRIL) 20 MG tablet       metFORMIN (GLUCOPHAGE-XR) 500 MG extended release tablet       naftifine (NAFTIN) 1 % cream Apply topically      docusate sodium (COLACE) 100 MG capsule Take 1 capsule by mouth 2 times daily 14 capsule 0    oxyCODONE-acetaminophen (PERCOCET) 5-325 MG per tablet Take 1 tablet by mouth every 6 hours as needed for Pain . 20 tablet 0    lisinopril (PRINIVIL;ZESTRIL) 10 MG tablet Take 10 mg by mouth 2 times daily      docusate sodium (COLACE) 100 MG capsule Take 1 capsule by mouth daily as needed for Constipation (Patient taking differently: Take 100 mg by mouth nightly ) 40 capsule 1    eszopiclone (LUNESTA) 2 MG TABS Take 2 mg by mouth nightly as needed      Melatonin 10 MG TABS Take 10 mg by mouth nightly       metFORMIN (GLUCOPHAGE) 500 MG tablet Take 500 mg by mouth Daily with supper       Cholecalciferol (VITAMIN D-3 PO) Take 2,000 Units by mouth nightly        No current facility-administered medications for this visit. Review of Systems   Constitutional: Positive for activity change. Musculoskeletal: Positive for arthralgias and gait problem.      Past Medical History:   Diagnosis Date    Arthritis     Bladder tumor 12/2016    Blood in urine 12/2016    Cancer ([facility] [address] 12/2016    BLADDER-REMOVED NO CHEMO OR RADIATION    Cancer ([facility] [address] 12/2016    RT KIDNEY-PARTIAL REMOVAL NO CHEMO OR RADIATION    Dental crowns present

## 2018-10-19 ENCOUNTER — HOSPITAL ENCOUNTER (OUTPATIENT)
Age: 68
Setting detail: SPECIMEN
Discharge: HOME | End: 2018-10-19
Payer: COMMERCIAL

## 2018-10-19 LAB
ANION GAP SERPL CALCULATED.3IONS-SCNC: 10 MMOL/L (ref 9–17)
BUN BLDV-MCNC: 24 MG/DL (ref 8–23)
BUN/CREAT BLD: ABNORMAL (ref 9–20)
CALCIUM SERPL-MCNC: 10.4 MG/DL (ref 8.6–10.4)
CHLORIDE BLD-SCNC: 101 MMOL/L (ref 98–107)
CO2: 26 MMOL/L (ref 20–31)
CREAT SERPL-MCNC: 0.97 MG/DL (ref 0.5–0.9)
GFR AFRICAN AMERICAN: >60 ML/MIN
GFR NON-AFRICAN AMERICAN: 57 ML/MIN
GFR SERPL CREATININE-BSD FRML MDRD: ABNORMAL ML/MIN/{1.73_M2}
GFR SERPL CREATININE-BSD FRML MDRD: ABNORMAL ML/MIN/{1.73_M2}
GLUCOSE BLD-MCNC: 93 MG/DL (ref 70–99)
POTASSIUM SERPL-SCNC: 4.8 MMOL/L (ref 3.7–5.3)
SODIUM BLD-SCNC: 137 MMOL/L (ref 135–144)

## 2019-01-14 ENCOUNTER — HOSPITAL ENCOUNTER (OUTPATIENT)
Age: 69
Setting detail: SPECIMEN
Discharge: HOME | End: 2019-01-14
Payer: COMMERCIAL

## 2019-01-16 LAB — SURGICAL PATHOLOGY REPORT: NORMAL

## 2019-09-24 ENCOUNTER — OFFICE VISIT (OUTPATIENT)
Dept: ORTHOPEDIC SURGERY | Age: 69
End: 2019-09-24
Payer: COMMERCIAL

## 2019-09-24 VITALS
SYSTOLIC BLOOD PRESSURE: 175 MMHG | HEART RATE: 87 BPM | WEIGHT: 185 LBS | HEIGHT: 66 IN | DIASTOLIC BLOOD PRESSURE: 95 MMHG | RESPIRATION RATE: 20 BRPM | BODY MASS INDEX: 29.73 KG/M2

## 2019-09-24 DIAGNOSIS — M17.10 ARTHRITIS OF KNEE: Primary | ICD-10-CM

## 2019-09-24 DIAGNOSIS — M17.11 PRIMARY OSTEOARTHRITIS OF RIGHT KNEE: ICD-10-CM

## 2019-09-24 PROCEDURE — 20610 DRAIN/INJ JOINT/BURSA W/O US: CPT | Performed by: ORTHOPAEDIC SURGERY

## 2019-09-24 PROCEDURE — 99213 OFFICE O/P EST LOW 20 MIN: CPT | Performed by: ORTHOPAEDIC SURGERY

## 2019-09-24 NOTE — PROGRESS NOTES
RADIATION    Dental crowns present     UPPER AND LOWER    Diabetes mellitus ([facility] [address] 2011    ON ORAL MED    Hypertension 2015    ON RX STARTING 01/2017    Prediabetes     Wears glasses      Past Surgical History:   Procedure Laterality Date    BLADDER TUMOR EXCISION  01/16/2017    TUR-BT    BLADDER TUMOR EXCISION  01/27/2017    COLONOSCOPY  2006    DILATION AND CURETTAGE OF UTERUS  1995    PARTIAL NEPHRECTOMY Right 02/27/2017    radical neph    PARTIAL NEPHRECTOMY Right 2/27/2017    NEPHRECTOMY PARTIAL, CONVERTED TO RADICAL SI ROBOTIC, INTRAOP ULTRASOUND  performed by Teri Turcios MD at [place] [address]    T&A     Family History   Problem Relation Age of Onset    Breast Cancer Mother     Cancer Father         BLADDER    Heart Disease Father         ENLARGED HEART    Breast Cancer Sister     COPD Brother     Cancer Maternal Grandmother         UTERINE    Other Maternal Grandfather         SEPSIS    Cancer Brother         PANCREATIC     Social History     Tobacco Use    Smoking status: Never Smoker    Smokeless tobacco: Never Used   Substance Use Topics    Alcohol use: No    Drug use: No       Objective:     Vitals:    09/24/19 0930   BP: (!) 175/95   Pulse: 87   Resp: 20   Weight: 185 lb (83.9 kg)   Height: 5' 6\" (1.676 m)     Physical Exam  On examination the patient is alert and oriented x3 and appears well kempt she does walk with a slight limp. Examination of the right knee shows a varus deformity. Slight effusion. Range of motion from 0 to 110 degrees. Moderate medial joint line tenderness. No instability. Good quad strength no defect palpable. Negative straight leg raise. Radiology:            Impression:        Assessment:     Visit Diagnoses       Codes    Arthritis of knee    -  Primary M17.10    Primary osteoarthritis of right knee     M17.11           Plan:     Patient would like to repeat injection.   After sterile prep injected the right knee with 3 cc

## 2019-09-27 ENCOUNTER — TELEPHONE (OUTPATIENT)
Dept: ORTHOPEDIC SURGERY | Age: 69
End: 2019-09-27

## 2019-09-30 RX ORDER — BETAMETHASONE SODIUM PHOSPHATE AND BETAMETHASONE ACETATE 3; 3 MG/ML; MG/ML
12 INJECTION, SUSPENSION INTRA-ARTICULAR; INTRALESIONAL; INTRAMUSCULAR; SOFT TISSUE ONCE
Status: COMPLETED | OUTPATIENT
Start: 2019-09-30 | End: 2019-09-30

## 2019-09-30 RX ORDER — BUPIVACAINE HYDROCHLORIDE 5 MG/ML
3 INJECTION, SOLUTION PERINEURAL ONCE
Status: COMPLETED | OUTPATIENT
Start: 2019-09-30 | End: 2019-09-30

## 2019-09-30 RX ADMIN — BUPIVACAINE HYDROCHLORIDE 15 MG: 5 INJECTION, SOLUTION PERINEURAL at 10:44

## 2019-09-30 RX ADMIN — BETAMETHASONE SODIUM PHOSPHATE AND BETAMETHASONE ACETATE 12 MG: 3; 3 INJECTION, SUSPENSION INTRA-ARTICULAR; INTRALESIONAL; INTRAMUSCULAR; SOFT TISSUE at 10:42

## 2019-10-01 ENCOUNTER — OFFICE VISIT (OUTPATIENT)
Dept: ORTHOPEDIC SURGERY | Age: 69
End: 2019-10-01
Payer: COMMERCIAL

## 2019-10-01 VITALS — WEIGHT: 185 LBS | BODY MASS INDEX: 29.73 KG/M2 | HEIGHT: 66 IN | RESPIRATION RATE: 20 BRPM

## 2019-10-01 DIAGNOSIS — M17.11 PRIMARY OSTEOARTHRITIS OF RIGHT KNEE: Primary | ICD-10-CM

## 2019-10-01 PROCEDURE — 99213 OFFICE O/P EST LOW 20 MIN: CPT | Performed by: ORTHOPAEDIC SURGERY

## 2021-05-25 ENCOUNTER — RECORDS - HEALTHEAST (OUTPATIENT)
Dept: ADMINISTRATIVE | Facility: CLINIC | Age: 71
End: 2021-05-25

## 2021-05-26 ENCOUNTER — RECORDS - HEALTHEAST (OUTPATIENT)
Dept: ADMINISTRATIVE | Facility: CLINIC | Age: 71
End: 2021-05-26

## 2021-05-27 ENCOUNTER — RECORDS - HEALTHEAST (OUTPATIENT)
Dept: ADMINISTRATIVE | Facility: CLINIC | Age: 71
End: 2021-05-27

## 2021-05-28 ENCOUNTER — RECORDS - HEALTHEAST (OUTPATIENT)
Dept: ADMINISTRATIVE | Facility: CLINIC | Age: 71
End: 2021-05-28

## 2021-05-30 ENCOUNTER — RECORDS - HEALTHEAST (OUTPATIENT)
Dept: ADMINISTRATIVE | Facility: CLINIC | Age: 71
End: 2021-05-30

## 2022-05-19 ENCOUNTER — OFFICE VISIT (OUTPATIENT)
Dept: DERMATOLOGY | Facility: CLINIC | Age: 72
End: 2022-05-19
Payer: COMMERCIAL

## 2022-05-19 DIAGNOSIS — B07.9 VERRUCA VULGARIS: Primary | ICD-10-CM

## 2022-05-19 PROCEDURE — 17110 DESTRUCTION B9 LES UP TO 14: CPT | Performed by: DERMATOLOGY

## 2022-05-19 RX ORDER — BENZONATATE 100 MG/1
100 CAPSULE ORAL
COMMUNITY
Start: 2022-05-13

## 2022-05-19 ASSESSMENT — PAIN SCALES - GENERAL: PAINLEVEL: NO PAIN (0)

## 2022-05-19 NOTE — PROGRESS NOTES
NEW PATIENT VISIT     CHIEF COMPLAINT:  Persistent warts.    HISTORY OF PRESENT ILLNESS:  Alyx is a very pleasant, 71-year-old female who is a new patient to our Dermatology Clinic today, presenting with a several-month history of persistent warts on the right hand.  She has tried several over-the-counter treatments, but has not had any medical treatment for them to date.  She has no similar lesions elsewhere.    PHYSICAL EXAMINATION:    GENERAL:  This is a well-appearing, well-nourished female with a normal mood and affect who is oriented x3.  SKIN:  A focused exam of the bilateral hands was performed.  On the right dorsal 2nd and 4th digits, there are 2 domed, keratotic, verrucous, 4 mm papules.    ASSESSMENT AND PLAN:  Verruca vulgaris x2 on the right dorsal hand.  After informed verbal consent with a discussion of the risks and benefits, each of these lesions was treated with liquid nitrogen x10 seconds x2 cycles.  The patient tolerated this without complication.  Post procedure wound care instructions were provided.  She will follow up on an as-needed basis.      Wilfredo Garcia MD  Dermatology Attending

## 2022-05-19 NOTE — LETTER
5/19/2022       RE: Alyx Garcia  [address] [facility] [address]     Dear Colleague,    Thank you for referring your patient, Alyx Garcia, to the [facility] DERMATOLOGY CLINIC [place] at [facility]. Please see a copy of my visit note below.    NEW PATIENT VISIT     CHIEF COMPLAINT:  Persistent warts.    HISTORY OF PRESENT ILLNESS:  Alyx is a very pleasant, 71-year-old female who is a new patient to our Dermatology Clinic today, presenting with a several-month history of persistent warts on the right hand.  She has tried several over-the-counter treatments, but has not had any medical treatment for them to date.  She has no similar lesions elsewhere.    PHYSICAL EXAMINATION:    GENERAL:  This is a well-appearing, well-nourished female with a normal mood and affect who is oriented x3.  SKIN:  A focused exam of the bilateral hands was performed.  On the right dorsal 2nd and 4th digits, there are 2 domed, keratotic, verrucous, 4 mm papules.    ASSESSMENT AND PLAN:  Verruca vulgaris x2 on the right dorsal hand.  After informed verbal consent with a discussion of the risks and benefits, each of these lesions was treated with liquid nitrogen x10 seconds x2 cycles.  The patient tolerated this without complication.  Post procedure wound care instructions were provided.  She will follow up on an as-needed basis.      Wilfredo Garcia MD  Dermatology Attending            Again, thank you for allowing me to participate in the care of your patient.      Sincerely,    Wilfredo Garcia MD

## 2022-05-19 NOTE — LETTER
Date:June 13, 2022      Provider requested that no letter be sent. Do not send.       [facility]

## 2022-05-19 NOTE — NURSING NOTE
Dermatology Rooming Note    Alyx Garcia's goals for this visit include:   Chief Complaint   Patient presents with     Derm Problem     warts     Xi Mark CMA on 5/19/2022 at 3:13 PM

## 2022-06-12 PROBLEM — B07.9 VERRUCA VULGARIS: Status: ACTIVE | Noted: 2022-06-12

## 2023-01-04 ENCOUNTER — HOSPITAL ENCOUNTER (OUTPATIENT)
Age: 73
Setting detail: SPECIMEN
Discharge: HOME | End: 2023-01-04

## 2023-01-04 LAB
ABSOLUTE EOS #: 0.24 K/UL (ref 0–0.44)
ABSOLUTE IMMATURE GRANULOCYTE: 0.03 K/UL (ref 0–0.3)
ABSOLUTE LYMPH #: 1.76 K/UL (ref 1.1–3.7)
ABSOLUTE MONO #: 0.67 K/UL (ref 0.1–1.2)
ALBUMIN SERPL-MCNC: 3.8 G/DL (ref 3.5–5.2)
ALBUMIN/GLOBULIN RATIO: 1.2 (ref 1–2.5)
ALP BLD-CCNC: 185 U/L (ref 35–104)
ALT SERPL-CCNC: 262 U/L (ref 5–33)
ANION GAP SERPL CALCULATED.3IONS-SCNC: 10 MMOL/L (ref 9–17)
AST SERPL-CCNC: 55 U/L
BASOPHILS # BLD: 1 % (ref 0–2)
BASOPHILS ABSOLUTE: 0.05 K/UL (ref 0–0.2)
BILIRUB SERPL-MCNC: 0.5 MG/DL (ref 0.3–1.2)
BILIRUBIN URINE: NEGATIVE
BUN BLDV-MCNC: 22 MG/DL (ref 8–23)
CALCIUM SERPL-MCNC: 10.7 MG/DL (ref 8.6–10.4)
CASTS UA: ABNORMAL /LPF (ref 0–2)
CASTS UA: ABNORMAL /LPF (ref 0–2)
CHLORIDE BLD-SCNC: 107 MMOL/L (ref 98–107)
CHOLESTEROL, FASTING: 157 MG/DL
CHOLESTEROL/HDL RATIO: 3
CO2: 23 MMOL/L (ref 20–31)
COLOR: YELLOW
CREAT SERPL-MCNC: 1.29 MG/DL (ref 0.5–0.9)
CREATININE URINE: 109.8 MG/DL (ref 28–217)
EOSINOPHILS RELATIVE PERCENT: 3 % (ref 1–4)
EPITHELIAL CELLS UA: ABNORMAL /HPF (ref 0–5)
ESTIMATED AVERAGE GLUCOSE: 100 MG/DL
GFR SERPL CREATININE-BSD FRML MDRD: 44 ML/MIN/1.73M2
GLUCOSE BLD-MCNC: 106 MG/DL (ref 70–99)
GLUCOSE URINE: NEGATIVE
HBA1C MFR BLD: 5.1 % (ref 4–6)
HCT VFR BLD CALC: 41.4 % (ref 36.3–47.1)
HDLC SERPL-MCNC: 52 MG/DL
HEMOGLOBIN: 12.4 G/DL (ref 11.9–15.1)
IMMATURE GRANULOCYTES: 0 %
KETONES, URINE: NEGATIVE
LDL CHOLESTEROL: 82 MG/DL (ref 0–130)
LEUKOCYTE ESTERASE, URINE: ABNORMAL
LYMPHOCYTES # BLD: 23 % (ref 24–43)
MCH RBC QN AUTO: 28.4 PG (ref 25.2–33.5)
MCHC RBC AUTO-ENTMCNC: 30 G/DL (ref 28.4–34.8)
MCV RBC AUTO: 95 FL (ref 82.6–102.9)
MICROALBUMIN/CREAT 24H UR: 17 MG/L
MICROALBUMIN/CREAT UR-RTO: 15 MCG/MG CREAT
MONOCYTES # BLD: 9 % (ref 3–12)
MUCUS: ABNORMAL
NITRITE, URINE: NEGATIVE
NRBC AUTOMATED: 0 PER 100 WBC
PDW BLD-RTO: 14.8 % (ref 11.8–14.4)
PH UA: 5.5 (ref 5–8)
PLATELET # BLD: 358 K/UL (ref 138–453)
PMV BLD AUTO: 9.7 FL (ref 8.1–13.5)
POTASSIUM SERPL-SCNC: 4.6 MMOL/L (ref 3.7–5.3)
PROTEIN UA: ABNORMAL
RBC # BLD: 4.36 M/UL (ref 3.95–5.11)
RBC # BLD: ABNORMAL 10*6/UL
RBC UA: ABNORMAL /HPF (ref 0–2)
SEG NEUTROPHILS: 64 % (ref 36–65)
SEGMENTED NEUTROPHILS ABSOLUTE COUNT: 4.84 K/UL (ref 1.5–8.1)
SODIUM BLD-SCNC: 140 MMOL/L (ref 135–144)
SPECIFIC GRAVITY UA: 1.02 (ref 1–1.03)
TOTAL PROTEIN: 7.1 G/DL (ref 6.4–8.3)
TRIGLYCERIDE, FASTING: 115 MG/DL
TSH SERPL DL<=0.05 MIU/L-ACNC: 1.53 UIU/ML (ref 0.3–5)
TURBIDITY: CLEAR
URINE HGB: NEGATIVE
UROBILINOGEN, URINE: NORMAL
VITAMIN D 25-HYDROXY: 47 NG/ML
WBC # BLD: 7.6 K/UL (ref 3.5–11.3)
WBC UA: ABNORMAL /HPF (ref 0–5)

## 2023-02-22 ENCOUNTER — OFFICE VISIT (OUTPATIENT)
Dept: DERMATOLOGY | Facility: CLINIC | Age: 73
End: 2023-02-22
Payer: COMMERCIAL

## 2023-02-22 DIAGNOSIS — R23.8 FACIAL VOLUME DEPLETION: ICD-10-CM

## 2023-02-22 DIAGNOSIS — L98.8 RHYTIDES: Primary | ICD-10-CM

## 2023-02-22 PROBLEM — H17.9: Status: ACTIVE | Noted: 2019-09-04

## 2023-02-22 PROBLEM — H25.013 CORTICAL SENILE CATARACT, BILATERAL: Status: ACTIVE | Noted: 2019-09-04

## 2023-02-22 PROBLEM — M65.331 TRIGGER FINGER, RIGHT MIDDLE FINGER: Status: ACTIVE | Noted: 2018-07-12

## 2023-02-22 PROBLEM — H25.13 NUCLEAR SCLEROTIC CATARACT, BILATERAL: Status: ACTIVE | Noted: 2019-09-04

## 2023-02-22 PROBLEM — H52.4 PRESBYOPIA: Status: ACTIVE | Noted: 2019-09-04

## 2023-02-22 PROBLEM — Z87.81 HISTORY OF WRIST FRACTURE: Status: ACTIVE | Noted: 2018-07-12

## 2023-02-22 PROBLEM — S52.501D CLOSED FRACTURE OF LOWER END OF RIGHT RADIUS WITH ROUTINE HEALING: Status: ACTIVE | Noted: 2018-05-09

## 2023-02-22 PROBLEM — H52.223 REGULAR ASTIGMATISM, BILATERAL: Status: ACTIVE | Noted: 2019-09-04

## 2023-02-22 PROBLEM — H52.13 MYOPIA, BILATERAL: Status: ACTIVE | Noted: 2019-09-04

## 2023-02-22 PROCEDURE — 96999 UNLISTED SPEC DERM SVC/PX: CPT | Performed by: DERMATOLOGY

## 2023-02-22 RX ORDER — VALACYCLOVIR HYDROCHLORIDE 500 MG/1
500 TABLET, FILM COATED ORAL 2 TIMES DAILY
Qty: 6 TABLET | Refills: 0 | Status: CANCELLED | OUTPATIENT
Start: 2023-02-22 | End: 2023-02-25

## 2023-02-22 ASSESSMENT — PAIN SCALES - GENERAL: PAINLEVEL: NO PAIN (0)

## 2023-02-22 NOTE — LETTER
Date:March 27, 2023      Provider requested that no letter be sent. Do not send.       [facility]

## 2023-02-22 NOTE — PROGRESS NOTES
Botulinum Injection Procedure Note:  Cosmetic    Dermatology Problem List:  1. Warts, s/p cryotherapy   2. Rhytides  - Botox  - Plan for micro needling around the mouth along with Restylane Silk   - S/p Neck, Face and Brow lift   3. Facial volume depletion  -  the cheeks at outside dermatologist. Records requested from [facility].     Interval History: Not currently on any skin care regimen. Has had a neck, face and brow lift within the last several years. Only had seven units on the forehead, wears bang. History of Botox on the neck and face. Has had  the cheeks and liked it. She is most bothered by wrinkles around her mouth. Has never had laser or micro needling. No recent history of cold sores but maybe had them as a child.     Interval Exam:  - Prominent forehead veins  - Skin thinning on the hairline  - Rhytides on the chin, cheeks and forehead    ATTENDING STAFF SURGEON: Dr. Zaida Baig    RESIDENT SURGEON: SAW     NURSE: Andreia Henry RN     ANESTHESIA:   None    PREOPERATIVE DIAGNOSIS:   Rhytides    LOCATION: cheeks, chin, crow's feet, upper lip    LOT NO: [id number]    EXP DATE: 2025-09     NDC number : 7227-3514-30    OPERATION/PROCEDURE:   Intralesional botulinum toxin injection     Dilution with 0.6 ml preserved sterile normal saline in a 50 Unit Botox    Total units of botulinum toxin: 24     POSTOPERATIVE DIAGNOSIS:   SAME     PREPARATION:   Alcohol swab    DESCRIPTION OF OPERATION/PROCEDURE:   The nature and purpose of the procedure, associated risks including but not limited to bruising, headache or discomfort at the site(s), numbness, muscle twitching, brow or eyelid droop, headache, double vision, not enough effect or too much effect, difficulty whistling or drinking from a straw, loss of muscle tone, or infection. Possible consequences and complications, and alternative methods of treatment were explained in detail. The patient declined a personal or family history of  neuromuscular disease prior to the procedure. The patient is not pregnant or breast feeding.A signed informed operative consent was obtained.    The patient was taken to the operative suite and properly positioned. The area to be treated was defined and confirmed by the patient and physician. The area for Botox injection was marked.    Cosmetic procedure: A total of 24 Units were injected into sites at the cheeks, chin, upper lip, crow's feet. The patient tolerated the procedure well and there were no complications noted.     Clinical Follow-Up: 4 months or earlier for Microneedling + Restylane Silk     Staff Involved:  Scribe/Staff    Scribe Disclosure:  IELLIE, am serving as a scribe to document services personally performed by Zaida Baig MD based on data collection and the provider's statements to me.     Provider Disclosure:   The documentation recorded by the scribe accurately reflects the services I personally performed and the decisions made by me.    Zaida Baig MD    Department of Dermatology  [facility]: Phone: [phone], Fax:[phone]  [facility]: Phone: [phone], Fax: [phone]

## 2023-02-22 NOTE — LETTER
2/22/2023       RE: Alyx Garcia  [address] [facility] [address]     Dear Colleague,    Thank you for referring your patient, Alyx Garcia, to the [facility] DERMATOLOGY CLINIC [place] at [facility]. Please see a copy of my visit note below.    Botulinum Injection Procedure Note:  Cosmetic    Dermatology Problem List:  1. Warts, s/p cryotherapy   2. Rhytides  - Botox  - Plan for micro needling around the mouth along with Restylane Silk   - S/p Neck, Face and Brow lift   3. Facial volume depletion  -  the cheeks at outside dermatologist. Records requested from [facility].     Interval History: Not currently on any skin care regimen. Has had a neck, face and brow lift within the last several years. Only had seven units on the forehead, wears bang. History of Botox on the neck and face. Has had  the cheeks and liked it. She is most bothered by wrinkles around her mouth. Has never had laser or micro needling. No recent history of cold sores but maybe had them as a child.     Interval Exam:  - Prominent forehead veins  - Skin thinning on the hairline  - Rhytides on the chin, cheeks and forehead    ATTENDING STAFF SURGEON: Dr. Zaida Baig    RESIDENT SURGEON: SAW     NURSE: Andreia Henry RN     ANESTHESIA:   None    PREOPERATIVE DIAGNOSIS:   Rhytides    LOCATION: cheeks, chin, crow's feet, upper lip    LOT NO: [id number]    EXP DATE: 2025-09     NDC number : 5274-4814-38    OPERATION/PROCEDURE:   Intralesional botulinum toxin injection     Dilution with 0.6 ml preserved sterile normal saline in a 50 Unit Botox    Total units of botulinum toxin: 24     POSTOPERATIVE DIAGNOSIS:   SAME     PREPARATION:   Alcohol swab    DESCRIPTION OF OPERATION/PROCEDURE:   The nature and purpose of the procedure, associated risks including but not limited to bruising, headache or discomfort at the site(s), numbness, muscle twitching, brow or eyelid droop,  headache, double vision, not enough effect or too much effect, difficulty whistling or drinking from a straw, loss of muscle tone, or infection. Possible consequences and complications, and alternative methods of treatment were explained in detail. The patient declined a personal or family history of neuromuscular disease prior to the procedure. The patient is not pregnant or breast feeding.A signed informed operative consent was obtained.    The patient was taken to the operative suite and properly positioned. The area to be treated was defined and confirmed by the patient and physician. The area for Botox injection was marked.    Cosmetic procedure: A total of 24 Units were injected into sites at the cheeks, chin, upper lip, crow's feet. The patient tolerated the procedure well and there were no complications noted.     Clinical Follow-Up: 4 months or earlier for Microneedling + Restylane Silk     Staff Involved:  Scribe/Staff    Scribe Disclosure:  IELLIE, am serving as a scribe to document services personally performed by Zaida Baig MD based on data collection and the provider's statements to me.     Provider Disclosure:   The documentation recorded by the scribe accurately reflects the services I personally performed and the decisions made by me.    Zaida Baig MD    Department of Dermatology  [facility]: Phone: [phone], Fax:[phone]  [facility]: Phone: [phone], Fax: [phone]                Again, thank you for allowing me to participate in the care of your patient.      Sincerely,    Zaida Baig MD

## 2023-02-22 NOTE — NURSING NOTE
Dermatology Rooming Note    Alyx Garcia's goals for this visit include:   Chief Complaint   Patient presents with     Botox     Alyx is here for filler and botox.      Mary Zelaya, Visit Facilitator

## 2023-02-22 NOTE — PATIENT INSTRUCTIONS
In the AM:  Cleanse your skin  Moisturizer (DEJ Face Cream)  Nectifirm  Sunscreen  Makeup    In the PM  Cleanse your skin  Moisturizer (DEJ Face Cream)  Nectifirm  Tretinoin (every other night) can use before or after moisturizer  Eye cream   Botulinum Toxin (Botox/Dysport) Cosmetic Information    Risks: I will have pain, redness, and swelling. I may have bruising, headache or discomfort at the site(s). Risks are asymmetry, numbness, twitching, brow droop, eyelid droop, headache, double vision, not enough effect or too much effect, difficulty whistling or drinking, loss of muscle tone, headache or infection. A touch-up or multiple treatments may be required.    About Botulinum Toxin (Botox/Dysport)  You have inquired about Botox cosmetic. Botulinum toxin is a purified protein derivative developed from bacteria. It has the ability to immobilize facial muscles that create dynamic wrinkles. Dynamic wrinkles develop due to muscle contraction, and over time become permanent folds in the skin, even when the muscles are not flexed. The use of Botox results in a very pleasing cosmetic effect for many people, leading to a more youthful, relaxed appearance. Botox can be used in combination with injectable fillers, chemical peels, and laser resurfacing to treat deeper wrinkles. It also has become an accepted form of treatment for hyperhidrosis, (or excessive sweating) in people who have not responded to other therapies.     With my treatment side effects may include bruising, headache or discomfort at the site(s). Asymmetry may occur and a touch-up may be required. Risks of this procedure include numbness, muscle twitching, brow or eyelid droop, headache, double vision, not enough effect or too much effect, difficulty whistling or drinking from a straw, loss of muscle tone, headache or infection.    Post-Procedure Instructions:   Do not rub the treated area. Avoid exercise for the 24 hours following the procedure. You may move  the muscles. Some people will experience bruising or eyelid ptosis (drooping) after injection. This is temporary and usually mild. Eyelid ptosis may be treated with special eye drops. Call your doctor if you have any questions or concerns after your treatment.     Do not massage the area for 24 hours  Stay upright for 4 hours  Do not push on the injected area  Contact us if you have asymmetry at 2 weeks  Do not apply skin products to the area for 24 hours    Who should I call with questions?  [facility]: [phone]  [facility]: [phone]  For urgent needs outside of business hours call the [facility] at [phone] and ask for the resident on call  Quikey messaging may be delayed

## 2023-05-08 ENCOUNTER — TELEPHONE (OUTPATIENT)
Dept: DERMATOLOGY | Facility: CLINIC | Age: 73
End: 2023-05-08
Payer: COMMERCIAL

## 2023-05-08 NOTE — TELEPHONE ENCOUNTER
Left pt another vm about canceling appt fro micro needling on 05/10. Pt will need to call back to reschedule.     Huyen Pool, Procedure  5/8/2023 3:48 PM

## 2023-05-14 ENCOUNTER — HEALTH MAINTENANCE LETTER (OUTPATIENT)
Age: 73
End: 2023-05-14

## 2023-05-17 ENCOUNTER — OFFICE VISIT (OUTPATIENT)
Dept: DERMATOLOGY | Facility: CLINIC | Age: 73
End: 2023-05-17
Payer: COMMERCIAL

## 2023-05-17 DIAGNOSIS — L98.8 RHYTIDES: Primary | ICD-10-CM

## 2023-05-17 PROCEDURE — 96999 UNLISTED SPEC DERM SVC/PX: CPT | Performed by: DERMATOLOGY

## 2023-05-17 RX ORDER — LORATADINE 10 MG/1
10 TABLET ORAL DAILY
COMMUNITY

## 2023-05-17 NOTE — NURSING NOTE
Dermatology Microneedeling Intake Checklist:  History of psoriasis:No,   History of recent tan, indoor or outdoor tanning/vacation or other sun exposure:No,   History of vitiligo:No,   Recent other cosmetic procedure(microderm abrasion/peel/hair removal/facial etc):No,   History of HSV:No,   Tattoo in the area to be treated:Not applicable,   Is patient using hydroquinone:No,   Retinoids and other acne medications stopped for 2 weeks:No,   Has the patient had accutane in the last 6-12 months:No,   Pregnant or breastfeeding: Not applicable,   History of skin cancer in area planned for treatment: No,    History of treatment with gold:No,   Changes in medical history: No,   Photos obtained: YES,   Does the patient smoke:No,   If patient is taking narcotic or diazepam(valium)-does patient have :Not applicable,   There were no vitals taken for this visit.

## 2023-05-17 NOTE — PROGRESS NOTES
Soft Tissue Augmentation Procedure Note: Cosmetic      Dermatology Problem List:  1. Rhytides    Procedure Date: 5/17/2023    Attending Staff: Zaida Baig MD    Assistant: Andreia Henry    Diagnosis: Facial rhytides and facical volume depletion    Location: Bilateral cheeks  Product: Restylane LYFT with Lidocaine   Amount: 1mL  Lot #: [id number]  Exp Date: 2025-03-31    Location: Lips  Product: Restylane KYSSE  Amount: 1mL  Lot #: [id number]  Exp Date: 2024-01-31      Description of Operation/Procedure:   The nature and purpose of the procedure, associated risks, possible consequences and complications, and alternative methods of treatment were explained in detail including but not limited to bruising, pain, redness,lumps/bumps, granuloma formation, scar blindness, stroke, ulceration, ischemia, under correction, over correction, swelling, possible need for multiple treatments, infection, granuloma, pain, dyspigmentation, numbness, weakness or tingling were explained to the patient. We discussed that multiple treatments may be required.   The patient verbalized understanding. Photo consent and signed informed consent were obtained. Time-out was performed and patient denied history of severe allergy to bees.  Denies recent upcoming dental procedures or covid vaccine in the last 2 weeks.      The facial areas were cleansed with alcohol swab and injections were performed.  The patient tolerated the procedure well and there were no complications. Ice was provided post-procedure. The patient was provided after care instructions and will follow-up in 1 week.     The patient will pay cosmetic fee today.      Staff:  Mickey BOX, am serving as a scribe to document services personally performed by Zaida Baig MD based on data collection and the provider's statements to me.     Provider Disclosure:   The documentation recorded by the scribe accurately reflects the services I personally performed and the decisions made by  me.    Zaida Baig MD    Department of Dermatology  [facility]: Phone: [phone], Fax:[phone]  [facility]: Phone: [phone], Fax: [phone]

## 2023-05-17 NOTE — LETTER
5/17/2023       RE: Alyx Garcia  [address] [facility] [address]     Dear Colleague,    Thank you for referring your patient, Alyx Garcia, to the [facility] DERMATOLOGY CLINIC [place] at [facility]. Please see a copy of my visit note below.    See procedure note    Soft Tissue Augmentation Procedure Note: Cosmetic      Dermatology Problem List:  1. Rhytides    Procedure Date: 5/17/2023    Attending Staff: Zaida Baig MD    Assistant: Andreia Henry    Diagnosis: Facial rhytides and facical volume depletion    Location: Bilateral cheeks  Product: Restylane LYFT with Lidocaine   Amount: 1mL  Lot #: [id number]  Exp Date: 2025-03-31    Location: Lips  Product: Restylane KYSSE  Amount: 1mL  Lot #: [id number]  Exp Date: 2024-01-31      Description of Operation/Procedure:   The nature and purpose of the procedure, associated risks, possible consequences and complications, and alternative methods of treatment were explained in detail including but not limited to bruising, pain, redness,lumps/bumps, granuloma formation, scar blindness, stroke, ulceration, ischemia, under correction, over correction, swelling, possible need for multiple treatments, infection, granuloma, pain, dyspigmentation, numbness, weakness or tingling were explained to the patient. We discussed that multiple treatments may be required.   The patient verbalized understanding. Photo consent and signed informed consent were obtained. Time-out was performed and patient denied history of severe allergy to bees.  Denies recent upcoming dental procedures or covid vaccine in the last 2 weeks.      The facial areas were cleansed with alcohol swab and injections were performed.  The patient tolerated the procedure well and there were no complications. Ice was provided post-procedure. The patient was provided after care instructions and will follow-up in 1 week.     The patient will pay cosmetic fee  today.      Staff:  I, Mickey Carmen, am serving as a scribe to document services personally performed by Zaida Baig MD based on data collection and the provider's statements to me.     Provider Disclosure:   The documentation recorded by the scribe accurately reflects the services I personally performed and the decisions made by me.    Zaida Baig MD    Department of Dermatology  [facility]: Phone: [phone], Fax:[phone]  [facility]: Phone: [phone], Fax: [phone]

## 2023-05-17 NOTE — PATIENT INSTRUCTIONS
Filler Information:    Risks of the procedure:I will have pain, bruising, redness, and swelling after the procedure and lasting for approximately 1-3 weeks. Risks are lumps/bumps, skin discoloration, bleeding, numbness, infection, granuloma formation, scar, ulceration, under correction, over correction and rarely, risks of stroke and blindness. Multiple treatments may be required.      What are  fillers?    Fillers are injected into the skin to soften crease or folds, support areas of volume loss or contour specific facial areas.  You may experience a mild to moderate amount of stinging or aching sensation post injection.  To ensure an even correction, the physician will massage the area treated, which may cause a temporary amount of redness to your skin.  Bruising at the site of injection is common and may last two weeks  Temporary minimal to moderate swelling can be expected, which should gradually improve following injection  It is normal to experience some tenderness at the treatment site for a few days    After treatment care instructions:  Apply an ice pack or cold compress to the injection area after treatment to help reduce swelling.  Keep your head elevated (even while sleeping) as much as possible and avoid sleeping on your side or stomach  No alcohol consumption or exercise for the first 24 hours after treatment.  Do not touch the treated area for 6 hours  You may use make-up, creams and sunscreens after 24 hours  You may return to normal/routine activities but you should check with your physician for their recommendation  Avoid excessive scrubbing or rubbing of the injection area  If you have previously suffered from cold sores, there is a risk that the needle punctures around the mouth/lips could contribute to another recurrence  Immediately report to your physician if you have any of the following:  Delayed swelling happening 7-14 days after treatment  Numbness lasting 3-4 days  Muscle weakness in the  area of injection  Severe pain  Dusky discoloration of one part of the face  Bruising  Changes in vision or eye pain  Cold sore  Avoid COVID vaccination 2 weeks after filler and dental procedures 4 weeks after filler    Who should I call with questions?  [facility]: [phone]   [facility]: [phone]  For urgent needs outside of business hours call the [facility] at [phone] and ask for the resident on call  MyChart messages may be delayed, call for urgent issues

## 2023-05-18 ENCOUNTER — MYC MEDICAL ADVICE (OUTPATIENT)
Dept: DERMATOLOGY | Facility: CLINIC | Age: 73
End: 2023-05-18
Payer: COMMERCIAL

## 2023-06-28 ENCOUNTER — OFFICE VISIT (OUTPATIENT)
Dept: DERMATOLOGY | Facility: CLINIC | Age: 73
End: 2023-06-28
Payer: COMMERCIAL

## 2023-06-28 DIAGNOSIS — L98.8 RHYTIDES: Primary | ICD-10-CM

## 2023-06-28 PROCEDURE — 96999 UNLISTED SPEC DERM SVC/PX: CPT | Performed by: DERMATOLOGY

## 2023-06-28 RX ORDER — VALACYCLOVIR HYDROCHLORIDE 500 MG/1
500 TABLET, FILM COATED ORAL 2 TIMES DAILY
Qty: 14 TABLET | Refills: 2 | Status: SHIPPED | OUTPATIENT
Start: 2023-06-28 | End: 2023-08-02

## 2023-06-28 NOTE — LETTER
6/28/2023       RE: Alyx Garcia  [address]  [facility] [address]     Dear Colleague,    Thank you for referring your patient, Alyx Garcia, to the [facility] DERMATOLOGY CLINIC [facility]. Please see a copy of my visit note below.    See procedure note with nursing. I consented her. I started her valtrex.   I saw the patient.     Sincerely,    Zaida Baig MD

## 2023-06-28 NOTE — NURSING NOTE
Dermatology Microneedeling Intake Checklist:  History of psoriasis:Yes  History of recent tan, indoor or outdoor tanning/vacation or other sun exposure:No   History of vitiligo:No  Recent other cosmetic procedure(microderm abrasion/peel/hair removal/facial etc):No  History of HSV:No  Tattoo in the area to be treated:No  Is patient using hydroquinone:No   Retinoids and other acne medications stopped for 2 weeks:NO  Has the patient had accutane in the last 6-12 months:No  Pregnant or breastfeeding: Not applicable  History of skin cancer in area planned for treatment: No   History of treatment with gold:No  Changes in medical history: No  Photos obtained: YES  Does the patient smoke:No  If patient is taking narcotic or diazepam(valium)-does patient have :Not applicable,  There were no vitals taken for this visit.

## 2023-06-28 NOTE — PATIENT INSTRUCTIONS
[facility] Dermatology Visit    Thank you for allowing us to participate in your care. Your findings, instructions and follow-up plan are as follows:             When should I call my doctor?  If you are worsening or not improving, please, contact us or seek urgent care as noted below.     Who should I call with questions (adults)?  [facility] (adult and pediatric): [phone]  [facility] (adult): [phone]  For urgent needs outside of business hours call the [facility] at [phone] and ask for the dermatology resident on call  If this is a medical emergency and you are unable to reach an ER, Call 911    Who should I call with questions (pediatric)?  [facility]- Pediatric Dermatology  Dr. Claire Flores, Dr. Anjel Vera, Dr. Arianne Horner, Lois Li, PA  Dr. Lela Quinn, Dr. Arelis Gomez & Dr. Corey Bustamante  Non Urgent  Nurse Triage Line; [phone]- Sandra and Lucinda [facility] Care Coordinators   Radha (/Complex ) [phone]    If you need a prescription refill, please contact your pharmacy. Refills are approved or denied by our physicians during normal business hours, Monday through Fridays  Per office policy, refills will not be granted if you have not been seen within the past year (or sooner depending on your child's condition).    Scheduling Information:  Pediatric Appointment Scheduling and Call Center [phone]  Radiology Scheduling- [phone]  Sedation Unit Scheduling- [phone]  [place] Scheduling- General [phone]; Pediatric Dermatology [phone]  Main  Services: [phone]  Yi: [phone]  Welsh: [phone]  Hmong/Haitian/Salas: [phone]  Preadmission Nursing Department Fax Number: [phone] (fax all pre-operative paperwork to this number)    For urgent matters arising during evenings, weekends,  or holidays that cannot wait for normal business hours please call [phone] and ask for the dermatology resident on call to be paged.

## 2023-06-28 NOTE — NURSING NOTE
Micorneedling Procedure: Cosmetic    Procedure Date: 06/28/2023  Staff:Andreia Henry RN    Procedure:   Microneedling treatment #  1 of 3.   Approximate length of treatment: 30 minutes    Anesthesia and Premedication:  Anesthesia (topical): Benzocaine 20%/Lidocaine 8%/Tetracaine 4% ointment  Oral prophylaxis: valacyclovir 500mg twice daily for 7 days, starting 2 days prior to the procedure    Device Settings:  Diagnosis:     Location: crows feet, cheeks  Microneedling depth: 0.25mm  Number of Passes: 3    Location: perioral  Microneedling depth: 0.5mm  Number of Passes: 3      Description of Procedure:   The nature and purpose of the procedure, associated risks, possible consequences, complications, and alternative methods of treatment were explained in detail including but not limited to redness, swelling, pinpoint bleeding of the skin, eye injury, infection, oozing, heat sensation, itching or acne.  Possible outcomes were reviewed including the following: no improvement, slight improvement, skin lightening or darkening. The possibility of permanent scarring was reviewed. Discussion that multiple treatments may be required was completed.     Photo consent was obtained and reviewed, a time out was performed, and informed consent was obtained.  Protective eyewear was worn by all personnel in the treatment room. The settings were verified by meter reading. The skin was cleansed with gentle soap and water. Protective eyewear was worn by the patient and the area was prepped with chlorhexidine. Hyaluronic acid gliding gel (eclipse) was applied to the treatment area. The areas were treated with Eclipse microneedle laser as described above. After treatment, additional hyaluronic acid gel was applied.       Verbal and written aftercare instructions were provided. Post procedure care including use of mild, gentle cleansers and moisturizers  for the 2 weeks following treatment was reviewed. The patient was recommend  application of at least SPF 50 sunscreen daily with a physical blocker after 24 hours  and avoidance of direct sunlight up to 3 months post procedure. The patient was reminded to complete valtrex. The patient was discharged from the dermatology clinic in good condition.    The patient will follow up with nursing in 48 hours and with MD in 4weeks.     The patient will pay the cosmetic fee today.     Staff Involved:  Andreia Henry RN

## 2023-06-29 ENCOUNTER — MYC MEDICAL ADVICE (OUTPATIENT)
Dept: DERMATOLOGY | Facility: CLINIC | Age: 73
End: 2023-06-29
Payer: COMMERCIAL

## 2023-07-24 ENCOUNTER — HOSPITAL ENCOUNTER (OUTPATIENT)
Age: 73
Setting detail: SPECIMEN
Discharge: HOME | End: 2023-07-24

## 2023-07-24 LAB
ANION GAP SERPL CALCULATED.3IONS-SCNC: 19 MMOL/L (ref 9–17)
BASOPHILS # BLD: 0.05 K/UL (ref 0–0.2)
BASOPHILS NFR BLD: 1 % (ref 0–2)
BUN SERPL-MCNC: 23 MG/DL (ref 8–23)
CALCIUM SERPL-MCNC: 11.2 MG/DL (ref 8.6–10.4)
CHLORIDE SERPL-SCNC: 106 MMOL/L (ref 98–107)
CO2 SERPL-SCNC: 18 MMOL/L (ref 20–31)
CREAT SERPL-MCNC: 1.4 MG/DL (ref 0.5–0.9)
EOSINOPHIL # BLD: 0.15 K/UL (ref 0–0.44)
EOSINOPHILS RELATIVE PERCENT: 2 % (ref 1–4)
ERYTHROCYTE [DISTWIDTH] IN BLOOD BY AUTOMATED COUNT: 14.5 % (ref 11.8–14.4)
FOLATE SERPL-MCNC: >20 NG/ML
GFR SERPL CREATININE-BSD FRML MDRD: 39 ML/MIN/1.73M2
GLUCOSE SERPL-MCNC: 101 MG/DL (ref 70–99)
HCT VFR BLD AUTO: 39.1 % (ref 36.3–47.1)
HGB BLD-MCNC: 11.6 G/DL (ref 11.9–15.1)
IMM GRANULOCYTES # BLD AUTO: 0.04 K/UL (ref 0–0.3)
IMM GRANULOCYTES NFR BLD: 1 %
IMM RETICS NFR: 17.8 % (ref 2.7–18.3)
IRON SATN MFR SERPL: 14 % (ref 20–55)
IRON SERPL-MCNC: 30 UG/DL (ref 37–145)
LYMPHOCYTES NFR BLD: 1.6 K/UL (ref 1.1–3.7)
LYMPHOCYTES RELATIVE PERCENT: 20 % (ref 24–43)
MCH RBC QN AUTO: 27.2 PG (ref 25.2–33.5)
MCHC RBC AUTO-ENTMCNC: 29.7 G/DL (ref 28.4–34.8)
MCV RBC AUTO: 91.6 FL (ref 82.6–102.9)
MONOCYTES NFR BLD: 0.59 K/UL (ref 0.1–1.2)
MONOCYTES NFR BLD: 7 % (ref 3–12)
NEUTROPHILS NFR BLD: 69 % (ref 36–65)
NEUTS SEG NFR BLD: 5.63 K/UL (ref 1.5–8.1)
NRBC BLD-RTO: 0 PER 100 WBC
PLATELET # BLD AUTO: 385 K/UL (ref 138–453)
PMV BLD AUTO: 9.4 FL (ref 8.1–13.5)
POTASSIUM SERPL-SCNC: 5.2 MMOL/L (ref 3.7–5.3)
RBC # BLD AUTO: 4.27 M/UL (ref 3.95–5.11)
RBC # BLD: ABNORMAL 10*6/UL
RETIC HEMOGLOBIN: 32.2 PG (ref 28.2–35.7)
RETICS # AUTO: 0.09 M/UL (ref 0.03–0.08)
RETICS/RBC NFR AUTO: 2.2 % (ref 0.5–1.9)
SODIUM SERPL-SCNC: 143 MMOL/L (ref 135–144)
TIBC SERPL-MCNC: 214 UG/DL (ref 250–450)
UNSATURATED IRON BINDING CAPACITY: 184 UG/DL (ref 112–347)
VIT B12 SERPL-MCNC: 968 PG/ML (ref 232–1245)
WBC OTHER # BLD: 8.1 K/UL (ref 3.5–11.3)

## 2023-08-02 ENCOUNTER — OFFICE VISIT (OUTPATIENT)
Dept: DERMATOLOGY | Facility: CLINIC | Age: 73
End: 2023-08-02
Payer: COMMERCIAL

## 2023-08-02 DIAGNOSIS — L98.8 RHYTIDES: Primary | ICD-10-CM

## 2023-08-02 PROCEDURE — 96999 UNLISTED SPEC DERM SVC/PX: CPT | Performed by: DERMATOLOGY

## 2023-08-02 RX ORDER — VALACYCLOVIR HYDROCHLORIDE 500 MG/1
500 TABLET, FILM COATED ORAL 2 TIMES DAILY
Qty: 14 TABLET | Refills: 2 | Status: SHIPPED | OUTPATIENT
Start: 2023-08-02

## 2023-08-02 NOTE — LETTER
8/2/2023       RE: Alyx Garcia  [address] [facility] [address]     Dear Colleague,    Thank you for referring your patient, Alyx Garcia, to the [facility] DERMATOLOGY CLINIC [place] at [facility]. Please see a copy of my visit note below.    Micorneedling Procedure: Cosmetic    Procedure Date: 08/01/2023  Staff: Andreia Henry RN    Procedure:   Microneedling treatment #  2 of 3  Approximate length of treatment: 30 minutes    Anesthesia and Premedication:  Anesthesia (topical): Benzocaine 20%/Lidocaine 8%/Tetracaine 4% ointment  Oral prophylaxis: valacyclovir 500mg twice daily for 7 days, starting 2 days prior to the procedure    Device Settings:  Diagnosis: Rhytides    Location: crows feet, cheeks  Microneedling depth: 0.25 mm  Number of Passes: 3    Location: perioral  Microneedling depth: 0.5 mm  Number of Passes: 3      Description of Procedure:   The nature and purpose of the procedure, associated risks, possible consequences, complications, and alternative methods of treatment were explained in detail including but not limited to redness, swelling, pinpoint bleeding of the skin, eye injury, infection, oozing, heat sensation, itching or acne.  Possible outcomes were reviewed including the following: no improvement, slight improvement, skin lightening or darkening. The possibility of permanent scarring was reviewed. Discussion that multiple treatments may be required was completed.     Photo consent was obtained and reviewed, a time out was performed, and informed consent was obtained.  Protective eyewear was worn by all personnel in the treatment room. The settings were verified by meter reading. The skin was cleansed with gentle soap and water. Protective eyewear was worn by the patient and the area was prepped with chlorhexidine. Hyaluronic acid gliding gel (eclipse) was applied to the treatment area. The areas were treated with Eclipse  microneedle laser as described above. After treatment, additional hyaluronic acid gel was applied.       Verbal and written aftercare instructions were provided. Post procedure care including use of mild, gentle cleansers and moisturizers  for the 2 weeks following treatment was reviewed. The patient was recommend application of at least SPF 50 sunscreen daily with a physical blocker after 24 hours  and avoidance of direct sunlight up to 3 months post procedure. The patient was reminded to complete valtrex. The patient was discharged from the dermatology clinic in good condition.    The patient will follow up with nursing in 48 hours and with MD in 4weeks.     The patient will pay the cosmetic fee today.     Staff Involved:  Andreia Henry RN      Again, thank you for allowing me to participate in the care of your patient.      Sincerely,    Zaida Baig MD

## 2023-08-02 NOTE — NURSING NOTE
Dermatology Microneedeling Intake Checklist:  History of psoriasis:No  History of recent tan, indoor or outdoor tanning/vacation or other sun exposure:No  History of vitiligo:No  Recent other cosmetic procedure(microderm abrasion/peel/hair removal/facial etc):No  History of HSV:No  Tattoo in the area to be treated:No,   Is patient using hydroquinone:No,   Retinoids and other acne medications stopped for 2 weeks:No,   Has the patient had accutane in the last 6-12 months:No,   Pregnant or breastfeeding: No,   History of skin cancer in area planned for treatment: No,    History of treatment with gold:No,   Changes in medical history: No,   Photos obtained: No  Does the patient smoke:No  If patient is taking narcotic or diazepam(valium)-does patient have :No  There were no vitals taken for this visit.    Micorneedling Procedure: Cosmetic    Procedure Date: 08/02/2023  Staff:Andreia Henry RN  Resident/Fellow:      Procedure:   Microneedling treatment #  2 of 3.   Approximate length of treatment: 30 minutes    Anesthesia and Premedication:  Anesthesia (topical): Benzocaine 20%/Lidocaine 8%/Tetracaine 4% ointment  Oral prophylaxis: valacyclovir 500mg twice daily for 7 days, starting 2 days prior to the procedure    Device Settings:  Diagnosis:     Location: forehead  Microneedling depth: 0.25mm  Number of Passes: 3    Location: orbital  Microneedling depth: 0.25mm  Number of Passes: 3    Location: cheeks  Microneedling depth: 0.75mm  Number of Passes: 3    Location: periorbital  Microneedling depth: 0.5mm  Number of Passes: 3    Location: chin  Microneedling depth: 0.75mm  Number of Passes: 3    See microneedling map in media tab    Fotofinder photos: No    Description of Procedure:   The nature and purpose of the procedure, associated risks, possible consequences, complications, and alternative methods of treatment were explained in detail including but not limited to redness, swelling, pinpoint bleeding of the  skin, eye injury, infection, oozing, heat sensation, itching or acne.  Possible outcomes were reviewed including the following: no improvement, slight improvement, skin lightening or darkening. The possibility of permanent scarring was reviewed. Discussion that multiple treatments may be required was completed.     Photo consent was obtained and reviewed, a time out was performed, and informed consent was obtained.  Protective eyewear was worn by all personnel in the treatment room. The settings were verified by meter reading. The skin was cleansed with gentle soap and water. Protective eyewear was worn by the patient and the area was prepped with chlorhexidine. Hyaluronic acid gliding gel (eclipse) was applied to the treatment area. The areas were treated with Eclipse microneedle laser as described above. After treatment, additional hyaluronic acid gel was applied.       Verbal and written aftercare instructions were provided. Post procedure care including use of mild, gentle cleansers and moisturizers  for the 2 weeks following treatment was reviewed. The patient was recommend application of at least SPF 50 sunscreen daily with a physical blocker after 24 hours  and avoidance of direct sunlight up to 3 months post procedure. The patient was reminded to complete valtrex. The patient was discharged from the dermatology clinic in good condition.    The patient will follow up with nursing in 48 hours and with MD 9/6/23.    The patient will pay the cosmetic fee today.     Staff Involved:  Staff Only

## 2023-08-02 NOTE — PATIENT INSTRUCTIONS
What to Expect After Microneedling Treatment:    Microneedling Treatment produces side effects. I will experience redness, swelling, pain, and vibrating sensation. I may experience bleeding, oozing, itching, or acne. Risks are blistering, permanent scarring, temporary or permanent skin lightening or darkening, infection, and eye injury. I understand my outcome could be no improvement or slight improvement. Multiple treatments may be required.    Notify your physician if the severity of your side effects becomes a problem for you.     What you may feel and look like:    Immediately after the treatment, you will experience redness, swelling and sometimes pinpoint bleeding. You will notice most of the swelling on the first morning after treatment, particularly under the eyes. Swelling usually lasts two to three days. To minimize swelling do the following:  Apply cold compresses to the treatment area for 10 minutes of every hour on the day of treatment, until you go to bed.  Sleep elevated the first night. Use as many pillows as you can tolerate.      Over the next few days you will have pinkness that fades but can last a few weeks. Most redness resolves during the first week after treatment, but a samantha  glow  can remain for several weeks. If you wish, you can apply makeup to minimize the redness after 24 hours      How to Care for Your Skin After Treatment    Congratulations! You have taken the first step toward more healthy and radiant looking skin by having a microneedling treatment. Now it is important to help your skin heal quickly and protect your skin investment.     Your after treatment skin care regimen is tailored to the treatment you received today. Follow the instructions as checked below:    Immediately After Treatment. Use your hyaluronic acid gel given to you in clinic the first day.   Try to get as close to 24 hours as possible, then start steps 1, 2 and 3 in your JOCY hand out     First Week of Healing.  Keep treated area clean; avoid smoking, excessive alcohol consumption, excessive exercise, perspiring, swimming, or exposing skin to heat and sun.    Scrubs, Toners, Glycolic Acid, and Retin A. Your skin will be sensitive for the first week or so after treatment. Do not use products that will cause irritation during this time. Do not use abrasive scrubs, toners, or products that contain glycolyic acids or Retin A or tretinoin. Read the product labels.       Sun protection: Wear a hat and sunscreen (as recommended above) when you are in the sun. This will help you avoid micorneedling-induced hyperpigmentation (darker color). It will decrease your risk of problems.     You may start make up after 48 hours and after 2 weeks you may restart your old products    Cold Sores. If you have a history of cold sores, ask your doctor about care!    Abnormal Healing. If you notice any blisters, cuts, bruises, crusting/scabs, areas of raw skin, ulcerations, active bleeding, increased discomfort or pain, pigment changes (lighter or darker than usual complexion), or any other problems, please contact us as soon as possible.    Who should I call with questions?  [facility]: [phone]    [facility]: [phone]    For urgent needs outside of business hours call the [facility] at [phone] and ask for the dermatology resident on call

## 2023-09-06 NOTE — PATIENT INSTRUCTIONS
What to Expect After Microneedling Treatment:    Microneedling Treatment produces side effects. I will experience redness, swelling, pain, and vibrating sensation. I may experience bleeding, oozing, itching, or acne. Risks are blistering, permanent scarring, temporary or permanent skin lightening or darkening, infection, and eye injury. I understand my outcome could be no improvement or slight improvement. Multiple treatments may be required.    Notify your physician if the severity of your side effects becomes a problem for you.     What you may feel and look like:    Immediately after the treatment, you will experience redness, swelling and sometimes pinpoint bleeding. You will notice most of the swelling on the first morning after treatment, particularly under the eyes. Swelling usually lasts two to three days. To minimize swelling do the following:  Apply cold compresses to the treatment area for 10 minutes of every hour on the day of treatment, until you go to bed.  Sleep elevated the first night. Use as many pillows as you can tolerate.      Over the next few days you will have pinkness that fades but can last a few weeks. Most redness resolves during the first week after treatment, but a samantha  glow  can remain for several weeks. If you wish, you can apply makeup to minimize the redness after 24 hours      How to Care for Your Skin After Treatment    Congratulations! You have taken the first step toward more healthy and radiant looking skin by having a microneedling treatment. Now it is important to help your skin heal quickly and protect your skin investment.     Your after treatment skin care regimen is tailored to the treatment you received today. Follow the instructions as checked below:    Immediately After Treatment. Use your hyaluronic acid gel given to you in clinic the first day.   Try to get as close to 24 hours as possible, then start vanicream gentle facial cleanser and vanicream cream for 5  days, then you can slowly transition back to your normal routine as tolerated.    First Week of Healing. Keep treated area clean; avoid smoking, excessive alcohol consumption, excessive exercise, perspiring, swimming, or exposing skin to heat and sun.    Scrubs, Toners, Glycolic Acid, and Retin A. Your skin will be sensitive for the first week or so after treatment. Do not use products that will cause irritation during this time. Do not use abrasive scrubs, toners, or products that contain glycolyic acids or Retin A or tretinoin. Read the product labels.       Sun protection: Wear a hat and sunscreen (as recommended above) when you are in the sun. This will help you avoid micorneedling-induced hyperpigmentation (darker color). It will decrease your risk of problems.     You may start make up after 48 hours and after 1 week you may restart your old products    Cold Sores. If you have a history of cold sores, ask your doctor about care!    Abnormal Healing. If you notice any blisters, cuts, bruises, crusting/scabs, areas of raw skin, ulcerations, active bleeding, increased discomfort or pain, pigment changes (lighter or darker than usual complexion), or any other problems, please contact us as soon as possible.    Who should I call with questions?  [facility]: [phone]    [facility]: [phone]    For urgent needs outside of business hours call the [facility] at [phone] and ask for the dermatology resident on call

## 2023-09-06 NOTE — PROGRESS NOTES
Dermatology Microneedeling Intake Checklist:  History of psoriasis:No,   History of recent tan, indoor or outdoor tanning/vacation or other sun exposure:No,   History of vitiligo:No,   Recent other cosmetic procedure(microderm abrasion/peel/hair removal/facial etc):No,   History of HSV:No,   Tattoo in the area to be treated:No,   Is patient using hydroquinone:No,   Retinoids and other acne medications stopped for 2 weeks:YES,   Has the patient had accutane in the last 6-12 months:No,   Pregnant or breastfeeding: No,   History of skin cancer in area planned for treatment: No,    History of treatment with gold:No,   Changes in medical history: YES,   Photos obtained: YES,   Does the patient smoke:No,   If patient is taking narcotic or diazepam(valium)-does patient have :Not applicable,   There were no vitals taken for this visit.     Micorneedling Procedure: Cosmetic    Procedure Date: 09/06/2023  Staff:PRESTON Baig and Andreia Henry performed the procedure       Procedure:   Microneedling treatment #  3 of 23.        Anesthesia and Premedication:  Anesthesia (topical): Benzocaine 20%/Lidocaine 8%/Tetracaine 4% ointment  Oral prophylaxis: valacyclovir 500mg twice daily for 7 days, starting 2 days prior to the procedure    Device Settings:  Diagnosis:     Location: forehead  Microneedling depth: 0.25mm  Number of Passes: 3    Location: periorbital  Microneedling depth: 0.25mm  Number of Passes: 3    Location: cheeks  Microneedling depth: 0.5mm  Number of Passes: 3    Location: chin  Microneedling depth: 0.5mm  Number of Passes: 3    Location: perioral  Microneedling depth: 0.5mm  Number of Passes: 3    0.75mm on deep lines    Fotofinder photos: No    Description of Procedure:   The nature and purpose of the procedure, associated risks, possible consequences, complications, and alternative methods of treatment were explained in detail including but not limited to redness, swelling, pinpoint bleeding  of the skin, eye injury, infection, oozing, heat sensation, itching or acne.  Possible outcomes were reviewed including the following: no improvement, slight improvement, skin lightening or darkening. The possibility of permanent scarring was reviewed. Discussion that multiple treatments may be required was completed.     Photo consent was obtained and reviewed, a time out was performed, and informed consent was obtained.  Protective eyewear was worn by all personnel in the treatment room. The settings were verified by meter reading. The skin was cleansed with gentle soap and water. Protective eyewear was worn by the patient and the area was prepped with chlorhexidine. Hyaluronic acid gliding gel (eclipse) was applied to the treatment area. The areas were treated with Eclipse microneedle laser as described above. After treatment, additional hyaluronic acid gel was applied.       Verbal and written aftercare instructions were provided. Post procedure care including use of mild, gentle cleansers and moisturizers  for the 2 weeks following treatment was reviewed. The patient was recommend application of at least SPF 50 sunscreen daily with a physical blocker after 24 hours  and avoidance of direct sunlight up to 3 months post procedure. The patient was reminded to complete valtrex. The patient was discharged from the dermatology clinic in good condition.         The patient will pay the cosmetic fee today.      Staff Involved:  Staff        Andreia Henry RN    I rechecked the patient before treatment. I consented. I called her post op and she was doing well.   Zaida Baig MD    Department of Dermatology  [facility]: Phone: [phone], Fax:[phone]  [facility]: Phone: [phone], Fax: [phone]

## 2023-09-08 DIAGNOSIS — M25.562 PAIN IN BOTH KNEES, UNSPECIFIED CHRONICITY: Primary | ICD-10-CM

## 2023-09-08 DIAGNOSIS — M25.561 PAIN IN BOTH KNEES, UNSPECIFIED CHRONICITY: Primary | ICD-10-CM

## 2023-09-11 ENCOUNTER — OFFICE VISIT (OUTPATIENT)
Dept: ORTHOPEDIC SURGERY | Age: 73
End: 2023-09-11

## 2023-09-11 VITALS — HEIGHT: 66 IN | BODY MASS INDEX: 30.37 KG/M2 | RESPIRATION RATE: 16 BRPM | WEIGHT: 189 LBS

## 2023-09-11 DIAGNOSIS — M17.0 BILATERAL PRIMARY OSTEOARTHRITIS OF KNEE: Primary | ICD-10-CM

## 2023-09-11 RX ORDER — LIDOCAINE HYDROCHLORIDE 10 MG/ML
2 INJECTION, SOLUTION EPIDURAL; INFILTRATION; INTRACAUDAL; PERINEURAL ONCE
Status: DISCONTINUED | OUTPATIENT
Start: 2023-09-11 | End: 2023-09-11

## 2023-09-11 RX ORDER — METHYLPREDNISOLONE ACETATE 80 MG/ML
80 INJECTION, SUSPENSION INTRA-ARTICULAR; INTRALESIONAL; INTRAMUSCULAR; SOFT TISSUE ONCE
Status: CANCELLED | OUTPATIENT
Start: 2023-09-11 | End: 2023-09-11

## 2023-09-11 RX ORDER — METHYLPREDNISOLONE ACETATE 80 MG/ML
80 INJECTION, SUSPENSION INTRA-ARTICULAR; INTRALESIONAL; INTRAMUSCULAR; SOFT TISSUE ONCE
Status: COMPLETED | OUTPATIENT
Start: 2023-09-11 | End: 2023-09-11

## 2023-09-11 RX ORDER — LIDOCAINE HYDROCHLORIDE 10 MG/ML
4 INJECTION, SOLUTION INFILTRATION; PERINEURAL ONCE
Status: COMPLETED | OUTPATIENT
Start: 2023-09-11 | End: 2023-09-11

## 2023-09-11 RX ADMIN — LIDOCAINE HYDROCHLORIDE 4 ML: 10 INJECTION, SOLUTION INFILTRATION; PERINEURAL at 10:51

## 2023-09-11 RX ADMIN — METHYLPREDNISOLONE ACETATE 80 MG: 80 INJECTION, SUSPENSION INTRA-ARTICULAR; INTRALESIONAL; INTRAMUSCULAR; SOFT TISSUE at 10:51

## 2023-09-11 ASSESSMENT — ENCOUNTER SYMPTOMS
NAUSEA: 0
ABDOMINAL PAIN: 0
SHORTNESS OF BREATH: 0
APNEA: 0
ABDOMINAL DISTENTION: 0
COUGH: 0
VOMITING: 0
CHEST TIGHTNESS: 0
COLOR CHANGE: 0
DIARRHEA: 0
CONSTIPATION: 0
RESPIRATORY NEGATIVE: 1

## 2023-09-11 NOTE — PROGRESS NOTES
site in water for a minimum of 24 hours to avoid infection. This means no lakes, pools, ponds, or hot tubs for 24 hours. If the patient is diabetic the injection may increase their blood sugar for up to one week. The patient can do this cortisone injection once every 4 months as needed. If the injections stop working and do not give the patient relief the patient should consider surgical interventions to produce long term relief. A physical therapy prescription was not given but I did include exercises in her after visit summary. Her last set of cortisone injections worked several years but they injections can be done every 4 months or as needed. The patient will follow-up with me as needed. The patient will call the office immediately with any problems. Orders Placed This Encounter   Medications    lidocaine PF 1 % injection 2 mL    methylPREDNISolone acetate (DEPO-MEDROL) injection 80 mg       No orders of the defined types were placed in this encounter. This note is created with the assistance of a speech recognition program.  While intending to generate a document that actually reflects the content of the visit, the document can still have some errors including those of syntax and sound a like substitutions which may escape proof reading.   In such instances, actual meaning can be extrapolated by contextual diversion    Electronically signed by Merline Piccolo, PA-C, on 9/11/2023 at 9:36 AM

## 2023-09-13 ENCOUNTER — OFFICE VISIT (OUTPATIENT)
Dept: DERMATOLOGY | Facility: CLINIC | Age: 73
End: 2023-09-13
Payer: COMMERCIAL

## 2023-09-13 DIAGNOSIS — L57.8 PHOTOAGING OF SKIN: Primary | ICD-10-CM

## 2023-09-13 PROCEDURE — 96999 UNLISTED SPEC DERM SVC/PX: CPT | Performed by: DERMATOLOGY

## 2023-09-13 NOTE — LETTER
9/13/2023       RE: Alyx Garcia  [address] [facility] [place] [address]     Dear Colleague,    Thank you for referring your patient, Alyx Garcia, to the [facility] DERMATOLOGY [facility]INIC [place] at [facility]. Please see a copy of my visit note below.           Dermatology Microneedeling Intake Checklist:  History of psoriasis:No,   History of recent tan, indoor or outdoor tanning/vacation or other sun exposure:No,   History of vitiligo:No,   Recent other cosmetic procedure(microderm abrasion/peel/hair removal/facial etc):No,   History of HSV:No,   Tattoo in the area to be treated:No,   Is patient using hydroquinone:No,   Retinoids and other acne medications stopped for 2 weeks:YES,   Has the patient had accutane in the last 6-12 months:No,   Pregnant or breastfeeding: No,   History of skin cancer in area planned for treatment: No,    History of treatment with gold:No,   Changes in medical history: YES,   Photos obtained: YES,   Does the patient smoke:No,   If patient is taking narcotic or diazepam(valium)-does patient have :Not applicable,   There were no vitals taken for this visit.     Micorneedling Procedure: Cosmetic    Procedure Date: 09/06/2023  Staff:PRESTON Baig and Andreia Henry performed the procedure       Procedure:   Microneedling treatment #  3 of 23.        Anesthesia and Premedication:  Anesthesia (topical): Benzocaine 20%/Lidocaine 8%/Tetracaine 4% ointment  Oral prophylaxis: valacyclovir 500mg twice daily for 7 days, starting 2 days prior to the procedure    Device Settings:  Diagnosis:     Location: forehead  Microneedling depth: 0.25mm  Number of Passes: 3    Location: periorbital  Microneedling depth: 0.25mm  Number of Passes: 3    Location: cheeks  Microneedling depth: 0.5mm  Number of Passes: 3    Location: chin  Microneedling depth: 0.5mm  Number of Passes: 3    Location: perioral  Microneedling depth: 0.5mm  Number of  Passes: 3    0.75mm on deep lines    Fotofinder photos: No    Description of Procedure:   The nature and purpose of the procedure, associated risks, possible consequences, complications, and alternative methods of treatment were explained in detail including but not limited to redness, swelling, pinpoint bleeding of the skin, eye injury, infection, oozing, heat sensation, itching or acne.  Possible outcomes were reviewed including the following: no improvement, slight improvement, skin lightening or darkening. The possibility of permanent scarring was reviewed. Discussion that multiple treatments may be required was completed.     Photo consent was obtained and reviewed, a time out was performed, and informed consent was obtained.  Protective eyewear was worn by all personnel in the treatment room. The settings were verified by meter reading. The skin was cleansed with gentle soap and water. Protective eyewear was worn by the patient and the area was prepped with chlorhexidine. Hyaluronic acid gliding gel (eclipse) was applied to the treatment area. The areas were treated with Eclipse microneedle laser as described above. After treatment, additional hyaluronic acid gel was applied.       Verbal and written aftercare instructions were provided. Post procedure care including use of mild, gentle cleansers and moisturizers  for the 2 weeks following treatment was reviewed. The patient was recommend application of at least SPF 50 sunscreen daily with a physical blocker after 24 hours  and avoidance of direct sunlight up to 3 months post procedure. The patient was reminded to complete valtrex. The patient was discharged from the dermatology clinic in good condition.         The patient will pay the cosmetic fee today.      Staff Involved:  Staff        Andreia Henry RN    I rechecked the patient before treatment. I consented. I called her post op and she was doing well.   Zaida Baig MD  Associate  Professor  Department of Dermatology  [facility]: Phone: [phone], Fax:[phone]  [facility]: Phone: [phone], Fax: [phone]

## 2023-11-27 NOTE — PROGRESS NOTES
Patient had COVID Vaccine One weeks ago and filler must be postponed until next week. Elected for Botox today.    Botulinum Injection Procedure Note:  Cosmetic      Dermatology Problem List:  1. Rhytides  2. Facial volume depletion  - s/p [id number]      SURGEON (S): Dr. Zaida Baig     NURSE: Flores Sparks CMA     ANESTHESIA:   None    PREOPERATIVE DIAGNOSIS:   Rhytides    LOCATION: [facility]    OPERATION/PROCEDURE:   Intralesional botulinum toxin injection     Dilution with .5 preserved sterile normal saline in a 50 Unit Botox Vial.    Total units of botulinum toxin: 18    POSTOPERATIVE DIAGNOSIS:   SAME     PREPARATION:   Hibaclens    DESCRIPTION OF OPERATION/PROCEDURE:   The nature and purpose of the procedure, associated risks including but not limited to bruising, headache or discomfort at the site(s), numbness, muscle twitching, brow or eyelid droop, headache, double vision, not enough effect or too much effect, difficulty whistling or drinking from a straw, loss of muscle tone, or infection. Possible consequences and complications, and alternative methods of treatment were explained in detail. The patient declined a personal or family history of neuromuscular disease prior to the procedure. The patient is not pregnant or breast feeding. A signed informed operative consent was obtained.    The patient was taken to the operative suite and properly positioned. The area to be treated was defined and confirmed by the patient and physician. The area for Botox injection was marked.    Cosmetic procedure: Injections were performed. The patient tolerated the procedure well and there were no complications noted.         Clinical Follow-Up: next week for filler reschedule. Patient had COVID vaccine one week ago, therefore filler had to be postponed until next week. Restylane kysse and contour. Temples is countour, Kysse is perioral and also left cheek. Please numb all these areas, consented.         Staff  Involved:  Scribe/Staff  Scribe Disclosure:   I, Poonam Dion, am serving as a scribe to document services personally performed by Zaida Baig MD based on data collection and the provider's statements to me.         Provider Disclosure:   The documentation recorded by the scribe accurately reflects the services I personally performed and the decisions made by me.    Zaida Baig MD    Department of Dermatology  [facility]: Phone: [phone], Fax:[phone]  [facility]: Phone: [phone], Fax: [phone]

## 2023-11-29 ENCOUNTER — OFFICE VISIT (OUTPATIENT)
Dept: DERMATOLOGY | Facility: CLINIC | Age: 73
End: 2023-11-29
Payer: COMMERCIAL

## 2023-11-29 DIAGNOSIS — R23.8 FACIAL VOLUME DEPLETION: Primary | ICD-10-CM

## 2023-11-29 PROCEDURE — 96999 UNLISTED SPEC DERM SVC/PX: CPT | Performed by: DERMATOLOGY

## 2023-11-29 ASSESSMENT — PAIN SCALES - GENERAL: PAINLEVEL: NO PAIN (0)

## 2023-11-29 NOTE — PROGRESS NOTES
Drug Administration Record    Prior to injection, verified patient identity using patient's name and date of birth.  Due to injection administration, patient instructed to remain in clinic for 15 minutes  afterwards, and to report any adverse reaction to me immediately.    Drug Name: botulinum toxin with expiration date of 05/26  Dose: 18 Units  Route administered: IM  NDC #: 5269-0871-70  Amount of waste(mL):32  Reason for waste: Single use vial    LOT #: [id number]  SITE: face  : [employer]ergan  EXPIRATION DATE: 05/2026

## 2023-11-29 NOTE — PATIENT INSTRUCTIONS
Botulinum Toxin (Botox/Dysport) Cosmetic Information    Risks: I will have pain, redness, and swelling. I may have bruising, headache or discomfort at the site(s). Risks are asymmetry, numbness, twitching, brow droop, eyelid droop, headache, double vision, not enough effect or too much effect, difficulty whistling or drinking, loss of muscle tone, headache or infection. A touch-up or multiple treatments may be required.    About Botulinum Toxin (Botox/Dysport)  You have inquired about Botox cosmetic. Botulinum toxin is a purified protein derivative developed from bacteria. It has the ability to immobilize facial muscles that create dynamic wrinkles. Dynamic wrinkles develop due to muscle contraction, and over time become permanent folds in the skin, even when the muscles are not flexed. The use of Botox results in a very pleasing cosmetic effect for many people, leading to a more youthful, relaxed appearance. Botox can be used in combination with injectable fillers, chemical peels, and laser resurfacing to treat deeper wrinkles. It also has become an accepted form of treatment for hyperhidrosis, (or excessive sweating) in people who have not responded to other therapies.     With my treatment side effects may include bruising, headache or discomfort at the site(s). Asymmetry may occur and a touch-up may be required. Risks of this procedure include numbness, muscle twitching, brow or eyelid droop, headache, double vision, not enough effect or too much effect, difficulty whistling or drinking from a straw, loss of muscle tone, headache or infection.    Post-Procedure Instructions:   Do not rub the treated area. Avoid exercise for the 24 hours following the procedure. You may move the muscles. Some people will experience bruising or eyelid ptosis (drooping) after injection. This is temporary and usually mild. Eyelid ptosis may be treated with special eye drops. Call your doctor if you have any questions or concerns  after your treatment.     Do not massage the area for 24 hours  Stay upright for 4 hours  Do not push on the injected area  Contact us if you have asymmetry at 2 weeks  Do not apply skin products to the area for 24 hours    Who should I call with questions?  [facility]: [phone]  [facility]: [phone]  For urgent needs outside of business hours call the [facility] at [phone] and ask for the resident on call  MyChart messaging may be delayed

## 2023-11-29 NOTE — NURSING NOTE
Dermatology Rooming Note    Alyx Garcia's goals for this visit include:   Chief Complaint   Patient presents with    Derm Problem     Patient is here for filler. She states she liked the filler done in May.      Ally Zelaya, visit facilitator

## 2023-11-29 NOTE — LETTER
11/29/2023       RE: Alyx Garcia  [address] [facility] [address]     Dear Colleague,    Thank you for referring your patient, Alyx Garcia, to the [facility] DERMATOLOGY CLINIC [place] at [facility]. Please see a copy of my visit note below.    Patient had COVID Vaccine One weeks ago and filler must be postponed until next week. Elected for Botox today.    Botulinum Injection Procedure Note:  Cosmetic      Dermatology Problem List:  1. Rhytides  2. Facial volume depletion  - s/p [id number]      SURGEON (S): Dr. Zaida Baig     NURSE: Flores Sparks CMA     ANESTHESIA:   None    PREOPERATIVE DIAGNOSIS:   Rhytides    LOCATION: [facility]    OPERATION/PROCEDURE:   Intralesional botulinum toxin injection     Dilution with .5 preserved sterile normal saline in a 50 Unit Botox Vial.    Total units of botulinum toxin: 18    POSTOPERATIVE DIAGNOSIS:   SAME     PREPARATION:   Hibaclens    DESCRIPTION OF OPERATION/PROCEDURE:   The nature and purpose of the procedure, associated risks including but not limited to bruising, headache or discomfort at the site(s), numbness, muscle twitching, brow or eyelid droop, headache, double vision, not enough effect or too much effect, difficulty whistling or drinking from a straw, loss of muscle tone, or infection. Possible consequences and complications, and alternative methods of treatment were explained in detail. The patient declined a personal or family history of neuromuscular disease prior to the procedure. The patient is not pregnant or breast feeding. A signed informed operative consent was obtained.    The patient was taken to the operative suite and properly positioned. The area to be treated was defined and confirmed by the patient and physician. The area for Botox injection was marked.    Cosmetic procedure: Injections were performed. The patient tolerated the procedure well and there were no complications  noted.         Clinical Follow-Up: next week for filler reschedule. Patient had COVID vaccine one week ago, therefore filler had to be postponed until next week. Restylane kysse and contour. Temples is countour, Kysse is perioral and also left cheek. Please numb all these areas, consented.         Staff Involved:  Scribe/Staff  Scribe Disclosure:   I, Poonam Ashley, am serving as a scribe to document services personally performed by Zaida Baig MD based on data collection and the provider's statements to me.         Provider Disclosure:   The documentation recorded by the scribe accurately reflects the services I personally performed and the decisions made by me.    Zaida Baig MD    Department of Dermatology  [facility]: Phone: [phone], Fax:[phone]  [facility]: Phone: [phone], Fax: [phone]

## 2023-12-04 NOTE — PROGRESS NOTES
Soft Tissue Augmentation Procedure Note: Cosmetic    Procedure Date: 12/6/2023    Attending Staff: Zaida Baig MD    Assistant: Andreia Henry RN    Diagnosis: Facial rhytides and facical volume depletion    Anesthesia  Anesthesia (topical): Benzocaine 20%/Lidocaine 8%/Tetracaine 4% ointment    Location: temples, upper cutaneous lip, and cheeks  Product: Restylane contour   Amount: 1 mL  Lot #: [id number]  Exp Date: 11/30/2024    Location: temples, upper cutaneous lip, and cheeks  Product: Restylane kysse  Amount: 1cc  Lot #: [id number]  Exp Date: 5/31/2024         Description of Operation/Procedure:   The nature and purpose of the procedure, associated risks, possible consequences and complications, and alternative methods of treatment were explained in detail including but not limited to bruising, pain, redness,lumps/bumps, granuloma formation, scar blindness, stroke, ulceration, ischemia, under correction, over correction, swelling, possible need for multiple treatments, infection, granuloma, pain, dyspigmentation, numbness, weakness or tingling were explained to the patient. We discussed that multiple treatments may be required.  Discussion of FDA on-label and off-label use was completed and disclosure for any sites treated off-label versus on-label was provided to patient. The patient verbalized understanding. Photo consent and signed informed consent were obtained. Time-out was performed and patient denied history of severe allergy to bees.  Denies recent upcoming dental procedures or covid vaccine in the last 2 weeks.      The facial areas were cleansed with chlorhexidine and injections were performed.  The patient tolerated the procedure well and there were no complications. Ice was provided post-procedure. The patient was provided after care instructions and will follow-up in 1 week.     The patient will pay cosmetic fee today.    Return in 4-6 months for more filler.    Staff:  Beatriz Disclosure:   Poonam BOX  Dion, am serving as a scribe to document services personally performed by Zaida Baig MD based on data collection and the provider's statements to me.       Zaida Baig MD    Department of Dermatology  [facility]: Phone: [phone], Fax:[phone]  [facility]: Phone: [phone], Fax: [phone]

## 2023-12-04 NOTE — PATIENT INSTRUCTIONS
Filler Information:    Risks of the procedure:I will have pain, bruising, redness, and swelling after the procedure and lasting for approximately 1-3 weeks. Risks are lumps/bumps, skin discoloration, bleeding, numbness, infection, granuloma formation, scar, ulceration, under correction, over correction and rarely, risks of stroke and blindness. Multiple treatments may be required.      What are  fillers?    Fillers are injected into the skin to soften crease or folds, support areas of volume loss or contour specific facial areas.  You may experience a mild to moderate amount of stinging or aching sensation post injection.  To ensure an even correction, the physician will massage the area treated, which may cause a temporary amount of redness to your skin.  Bruising at the site of injection is common and may last two weeks  Temporary minimal to moderate swelling can be expected, which should gradually improve following injection  It is normal to experience some tenderness at the treatment site for a few days    After treatment care instructions:  Apply an ice pack or cold compress to the injection area after treatment to help reduce swelling.  Keep your head elevated (even while sleeping) as much as possible and avoid sleeping on your side or stomach  No alcohol consumption or exercise for the first 24 hours after treatment.  Do not touch the treated area for 6 hours  You may use make-up, creams and sunscreens after 24 hours  You may return to normal/routine activities but you should check with your physician for their recommendation  Avoid excessive scrubbing or rubbing of the injection area  If you have previously suffered from cold sores, there is a risk that the needle punctures around the mouth/lips could contribute to another recurrence  Immediately report to your physician if you have any of the following:  Delayed swelling happening 7-14 days after treatment  Numbness lasting 3-4 days  Muscle weakness in the  area of injection  Severe pain  Dusky discoloration of one part of the face  Bruising  Changes in vision or eye pain  Cold sore  Avoid COVID vaccination 2 weeks after filler and dental procedures 4 weeks after filler    Who should I call with questions?  [facility]: [phone]   [facility]: [phone]  For urgent needs outside of business hours call the [facility] at [phone] and ask for the resident on call  MyChart messages may be delayed, call for urgent issues

## 2023-12-06 ENCOUNTER — OFFICE VISIT (OUTPATIENT)
Dept: DERMATOLOGY | Facility: CLINIC | Age: 73
End: 2023-12-06
Payer: COMMERCIAL

## 2023-12-06 DIAGNOSIS — L98.8 RHYTIDES: Primary | ICD-10-CM

## 2023-12-06 PROCEDURE — 96999 UNLISTED SPEC DERM SVC/PX: CPT | Performed by: DERMATOLOGY

## 2023-12-06 RX ORDER — ALENDRONATE SODIUM 70 MG/1
70 TABLET ORAL
COMMUNITY
Start: 2023-12-01

## 2023-12-06 NOTE — NURSING NOTE
Chief Complaint   Patient presents with    Consult For     Filler cheeks     Jeanie RODRIGUEZ RN-BSN  Dermatology Surgery  [phone]

## 2023-12-06 NOTE — LETTER
12/6/2023       RE: Alyx Garcia  [address] [facility] [address]     Dear Colleague,    Thank you for referring your patient, Alyx Garcia, to the [facility] DERMATOLOGY CLINIC [place] at [facility]. Please see a copy of my visit note below.    Soft Tissue Augmentation Procedure Note: Cosmetic    Procedure Date: 12/6/2023    Attending Staff: Zaida Baig MD    Assistant: Andreia Henry RN    Diagnosis: Facial rhytides and facical volume depletion    Anesthesia  Anesthesia (topical): Benzocaine 20%/Lidocaine 8%/Tetracaine 4% ointment    Location: temples, upper cutaneous lip, and cheeks  Product: Restylane contour   Amount: 1 mL  Lot #: [id number]  Exp Date: 11/30/2024    Location: temples, upper cutaneous lip, and cheeks  Product: Restylane kysse  Amount: 1cc  Lot #: [id number]  Exp Date: 5/31/2024         Description of Operation/Procedure:   The nature and purpose of the procedure, associated risks, possible consequences and complications, and alternative methods of treatment were explained in detail including but not limited to bruising, pain, redness,lumps/bumps, granuloma formation, scar blindness, stroke, ulceration, ischemia, under correction, over correction, swelling, possible need for multiple treatments, infection, granuloma, pain, dyspigmentation, numbness, weakness or tingling were explained to the patient. We discussed that multiple treatments may be required.  Discussion of FDA on-label and off-label use was completed and disclosure for any sites treated off-label versus on-label was provided to patient. The patient verbalized understanding. Photo consent and signed informed consent were obtained. Time-out was performed and patient denied history of severe allergy to bees.  Denies recent upcoming dental procedures or covid vaccine in the last 2 weeks.      The facial areas were cleansed with chlorhexidine and injections were performed.  The  patient tolerated the procedure well and there were no complications. Ice was provided post-procedure. The patient was provided after care instructions and will follow-up in 1 week.     The patient will pay cosmetic fee today.    Return in 4-6 months for more filler.    Staff:  Scribe Disclosure:   Poonam BOX, am serving as a scribe to document services personally performed by Zaida Baig MD based on data collection and the provider's statements to me.       Zaida Baig MD    Department of Dermatology  [facility]: Phone: [phone], Fax:[phone]  [facility]: Phone: [phone], Fax: [phone]         Drug Administration Record    Prior to injection, verified patient identity using patient's name and date of birth.  Due to injection administration, patient instructed to remain in clinic for 15 minutes  afterwards, and to report any adverse reaction to me immediately.    Drug Name: Restylene Contour   Dose: 1 mL  Route administered: ID  NDC #:   Amount of waste(mL):0  Reason for waste: Single use vial    LOT #: [id number]  SITE: face  :   EXPIRATION DATE: 11/30/24      Drug Administration Record    Prior to injection, verified patient identity using patient's name and date of birth.  Due to injection administration, patient instructed to remain in clinic for 15 minutes  afterwards, and to report any adverse reaction to me immediately.    Drug Name: Restylene Kysse  Dose: 1mL  Route administered: ID  NDC #:   Amount of waste(mL):0  Reason for waste: Single use vial    LOT #: [id number]  SITE: face  :   EXPIRATION DATE: 05/31/24      Lidocaine no Epi  2mL once for one use, starting 12/6/2023 ending 12/6/2023,  2mL disp, R-0, injection  Injected by OTIS Carrington

## 2023-12-06 NOTE — PROGRESS NOTES
Drug Administration Record    Prior to injection, verified patient identity using patient's name and date of birth.  Due to injection administration, patient instructed to remain in clinic for 15 minutes  afterwards, and to report any adverse reaction to me immediately.    Drug Name: Restylene Contour   Dose: 1 mL  Route administered: ID  NDC #:   Amount of waste(mL):0  Reason for waste: Single use vial    LOT #: [id number]  SITE: face  :   EXPIRATION DATE: 11/30/24      Drug Administration Record    Prior to injection, verified patient identity using patient's name and date of birth.  Due to injection administration, patient instructed to remain in clinic for 15 minutes  afterwards, and to report any adverse reaction to me immediately.    Drug Name: Restylene Kysse  Dose: 1mL  Route administered: ID  NDC #:   Amount of waste(mL):0  Reason for waste: Single use vial    LOT #: [id number]  SITE: face  :   EXPIRATION DATE: 05/31/24      Lidocaine no Epi  2mL once for one use, starting 12/6/2023 ending 12/6/2023,  2mL disp, R-0, injection  Injected by OTIS Carrington

## 2024-02-12 ENCOUNTER — OFFICE VISIT (OUTPATIENT)
Dept: ORTHOPEDIC SURGERY | Age: 74
End: 2024-02-12
Payer: COMMERCIAL

## 2024-02-12 VITALS — OXYGEN SATURATION: 98 % | WEIGHT: 187.6 LBS | RESPIRATION RATE: 16 BRPM | BODY MASS INDEX: 30.15 KG/M2 | HEIGHT: 66 IN

## 2024-02-12 DIAGNOSIS — M17.0 BILATERAL PRIMARY OSTEOARTHRITIS OF KNEE: Primary | ICD-10-CM

## 2024-02-12 PROCEDURE — 99213 OFFICE O/P EST LOW 20 MIN: CPT | Performed by: PHYSICIAN ASSISTANT

## 2024-02-12 PROCEDURE — 20611 DRAIN/INJ JOINT/BURSA W/US: CPT | Performed by: PHYSICIAN ASSISTANT

## 2024-02-12 PROCEDURE — 1123F ACP DISCUSS/DSCN MKR DOCD: CPT | Performed by: PHYSICIAN ASSISTANT

## 2024-02-12 RX ORDER — LIDOCAINE HYDROCHLORIDE 10 MG/ML
12 INJECTION, SOLUTION INFILTRATION; PERINEURAL ONCE
Status: COMPLETED | OUTPATIENT
Start: 2024-02-12 | End: 2024-02-12

## 2024-02-12 RX ORDER — METHYLPREDNISOLONE ACETATE 80 MG/ML
80 INJECTION, SUSPENSION INTRA-ARTICULAR; INTRALESIONAL; INTRAMUSCULAR; SOFT TISSUE ONCE
Status: COMPLETED | OUTPATIENT
Start: 2024-02-12 | End: 2024-02-12

## 2024-02-12 RX ADMIN — METHYLPREDNISOLONE ACETATE 80 MG: 80 INJECTION, SUSPENSION INTRA-ARTICULAR; INTRALESIONAL; INTRAMUSCULAR; SOFT TISSUE at 14:33

## 2024-02-12 RX ADMIN — LIDOCAINE HYDROCHLORIDE 12 ML: 10 INJECTION, SOLUTION INFILTRATION; PERINEURAL at 14:32

## 2024-02-12 NOTE — PROGRESS NOTES
[facility] ORTHOPEDICS AND SPORTS MEDICINE  [address]  [place] [address]  Dept: [phone]  Dept Fax: [phone]        Ambulatory Follow Up      Subjective:   Ness Castañeda is a 73 y.o. year old female who presents to our office today for routine followup regarding her   1. Bilateral primary osteoarthritis of knee    .    Chief Complaint   Patient presents with    Knee Pain     B Knee Pain- Inj Req LI 9/11/23         HPI Ness Castañeda  is a 73 y.o.  female who presents today in follow for bilateral knee pain.  The patient was last seen on 9/11/2023 and underwent treatment in the form of bilateral cortisone injections.   The patient notes 100% improvement with the previous treatment until the last couple of weeks. She states she is using her cane.     Review of Systems   Constitutional:  Positive for activity change. Negative for appetite change, fatigue and fever.   Respiratory: Negative.  Negative for apnea, cough, chest tightness and shortness of breath.    Cardiovascular: Negative.  Negative for chest pain, palpitations and leg swelling.   Gastrointestinal: Negative.  Negative for abdominal distention, abdominal pain, constipation, diarrhea, nausea and vomiting.   Genitourinary: Negative.  Negative for difficulty urinating, dysuria and hematuria.   Musculoskeletal:  Positive for arthralgias and gait problem. Negative for joint swelling and myalgias.   Skin: Negative.  Negative for color change and rash.   Neurological:  Negative for dizziness, weakness, numbness and headaches.   Psychiatric/Behavioral: Negative.  Negative for sleep disturbance.        Objective :   Resp 16   Ht 1.676 m (5' 6\")   Wt 85.1 kg (187 lb 9.6 oz)   LMP 02/24/1998   SpO2 98%   BMI 30.28 kg/m²  Body mass index is 30.28 kg/m².  General: Ness Castañeda is a 73 y.o. female who is alert and oriented and sitting comfortably in our

## 2024-06-24 ENCOUNTER — HOSPITAL ENCOUNTER (OUTPATIENT)
Age: 74
Setting detail: SPECIMEN
Discharge: HOME | End: 2024-06-24

## 2024-06-24 LAB
25(OH)D3 SERPL-MCNC: 38 NG/ML (ref 30–100)
ALBUMIN SERPL-MCNC: 4.1 G/DL (ref 3.5–5.2)
ALBUMIN/GLOB SERPL: 1 {RATIO} (ref 1–2.5)
ALP SERPL-CCNC: 83 U/L (ref 35–104)
ALT SERPL-CCNC: 9 U/L (ref 10–35)
ANION GAP SERPL CALCULATED.3IONS-SCNC: 13 MMOL/L (ref 9–16)
AST SERPL-CCNC: 14 U/L (ref 10–35)
BASOPHILS # BLD: 0.06 K/UL (ref 0–0.2)
BASOPHILS NFR BLD: 1 % (ref 0–2)
BILIRUB SERPL-MCNC: 0.2 MG/DL (ref 0–1.2)
BILIRUB UR QL STRIP: NEGATIVE
BUN SERPL-MCNC: 28 MG/DL (ref 8–23)
CALCIUM SERPL-MCNC: 10.3 MG/DL (ref 8.6–10.4)
CHLORIDE SERPL-SCNC: 106 MMOL/L (ref 98–107)
CHOLEST SERPL-MCNC: 161 MG/DL (ref 0–199)
CHOLESTEROL/HDL RATIO: 3
CLARITY UR: CLEAR
CO2 SERPL-SCNC: 20 MMOL/L (ref 20–31)
COLOR UR: YELLOW
COMMENT: NORMAL
CREAT SERPL-MCNC: 1.3 MG/DL (ref 0.5–0.9)
CREAT UR-MCNC: 52.1 MG/DL (ref 28–217)
EOSINOPHIL # BLD: 0.21 K/UL (ref 0–0.44)
EOSINOPHILS RELATIVE PERCENT: 3 % (ref 1–4)
ERYTHROCYTE [DISTWIDTH] IN BLOOD BY AUTOMATED COUNT: 15.9 % (ref 11.8–14.4)
EST. AVERAGE GLUCOSE BLD GHB EST-MCNC: 105 MG/DL
GFR, ESTIMATED: 42 ML/MIN/1.73M2
GLUCOSE SERPL-MCNC: 95 MG/DL (ref 74–99)
GLUCOSE UR STRIP-MCNC: NEGATIVE MG/DL
HBA1C MFR BLD: 5.3 % (ref 4–6)
HCT VFR BLD AUTO: 35.4 % (ref 36.3–47.1)
HDLC SERPL-MCNC: 49 MG/DL
HGB BLD-MCNC: 10.3 G/DL (ref 11.9–15.1)
HGB UR QL STRIP.AUTO: NEGATIVE
IMM GRANULOCYTES # BLD AUTO: 0.04 K/UL (ref 0–0.3)
IMM GRANULOCYTES NFR BLD: 1 %
KETONES UR STRIP-MCNC: NEGATIVE MG/DL
LDLC SERPL CALC-MCNC: 77 MG/DL (ref 0–100)
LEUKOCYTE ESTERASE UR QL STRIP: NEGATIVE
LYMPHOCYTES NFR BLD: 1.46 K/UL (ref 1.1–3.7)
LYMPHOCYTES RELATIVE PERCENT: 19 % (ref 24–43)
MCH RBC QN AUTO: 25.6 PG (ref 25.2–33.5)
MCHC RBC AUTO-ENTMCNC: 29.1 G/DL (ref 28.4–34.8)
MCV RBC AUTO: 88.1 FL (ref 82.6–102.9)
MICROALBUMIN UR-MCNC: <12 MG/L (ref 0–20)
MICROALBUMIN/CREAT UR-RTO: NORMAL MCG/MG CREAT (ref 0–25)
MONOCYTES NFR BLD: 0.8 K/UL (ref 0.1–1.2)
MONOCYTES NFR BLD: 10 % (ref 3–12)
NEUTROPHILS NFR BLD: 66 % (ref 36–65)
NEUTS SEG NFR BLD: 5.19 K/UL (ref 1.5–8.1)
NITRITE UR QL STRIP: NEGATIVE
NRBC BLD-RTO: 0 PER 100 WBC
PH UR STRIP: 5.5 [PH] (ref 5–8)
PLATELET # BLD AUTO: 365 K/UL (ref 138–453)
PMV BLD AUTO: 9.3 FL (ref 8.1–13.5)
POTASSIUM SERPL-SCNC: 4.7 MMOL/L (ref 3.7–5.3)
PROT SERPL-MCNC: 7.3 G/DL (ref 6.6–8.7)
PROT UR STRIP-MCNC: NEGATIVE MG/DL
RBC # BLD AUTO: 4.02 M/UL (ref 3.95–5.11)
RBC # BLD: ABNORMAL 10*6/UL
SODIUM SERPL-SCNC: 139 MMOL/L (ref 136–145)
SP GR UR STRIP: 1.01 (ref 1–1.03)
T3FREE SERPL-MCNC: 2.3 PG/ML (ref 2–4.4)
T4 FREE SERPL-MCNC: 1.1 NG/DL (ref 0.92–1.68)
TRIGL SERPL-MCNC: 177 MG/DL (ref 0–149)
TSH SERPL DL<=0.05 MIU/L-ACNC: 0.98 UIU/ML (ref 0.27–4.2)
UROBILINOGEN UR STRIP-ACNC: NORMAL EU/DL (ref 0–1)
VLDLC SERPL CALC-MCNC: 35 MG/DL
WBC OTHER # BLD: 7.8 K/UL (ref 3.5–11.3)

## 2024-07-05 DIAGNOSIS — M25.551 RIGHT HIP PAIN: Primary | ICD-10-CM

## 2024-07-08 ENCOUNTER — OFFICE VISIT (OUTPATIENT)
Dept: ORTHOPEDIC SURGERY | Age: 74
End: 2024-07-08
Payer: MEDICARE

## 2024-07-08 VITALS — BODY MASS INDEX: 29.6 KG/M2 | HEIGHT: 66 IN | RESPIRATION RATE: 16 BRPM | OXYGEN SATURATION: 98 % | WEIGHT: 184.2 LBS

## 2024-07-08 DIAGNOSIS — M17.11 PRIMARY OSTEOARTHRITIS OF RIGHT KNEE: ICD-10-CM

## 2024-07-08 DIAGNOSIS — M70.61 GREATER TROCHANTERIC BURSITIS OF RIGHT HIP: Primary | ICD-10-CM

## 2024-07-08 PROCEDURE — 20611 DRAIN/INJ JOINT/BURSA W/US: CPT | Performed by: PHYSICIAN ASSISTANT

## 2024-07-08 PROCEDURE — 99214 OFFICE O/P EST MOD 30 MIN: CPT | Performed by: PHYSICIAN ASSISTANT

## 2024-07-08 PROCEDURE — 1123F ACP DISCUSS/DSCN MKR DOCD: CPT | Performed by: PHYSICIAN ASSISTANT

## 2024-07-08 RX ORDER — LIDOCAINE HYDROCHLORIDE 10 MG/ML
2 INJECTION, SOLUTION INFILTRATION; PERINEURAL ONCE
Status: COMPLETED | OUTPATIENT
Start: 2024-07-08 | End: 2024-07-09

## 2024-07-08 RX ORDER — METHYLPREDNISOLONE ACETATE 80 MG/ML
80 INJECTION, SUSPENSION INTRA-ARTICULAR; INTRALESIONAL; INTRAMUSCULAR; SOFT TISSUE ONCE
Status: COMPLETED | OUTPATIENT
Start: 2024-07-08 | End: 2024-07-09

## 2024-07-08 ASSESSMENT — ENCOUNTER SYMPTOMS
DIARRHEA: 0
SHORTNESS OF BREATH: 0
APNEA: 0
RESPIRATORY NEGATIVE: 1
CHEST TIGHTNESS: 0
NAUSEA: 0
ABDOMINAL DISTENTION: 0
GASTROINTESTINAL NEGATIVE: 1
COLOR CHANGE: 0
VOMITING: 0
ABDOMINAL PAIN: 0
CONSTIPATION: 0
COUGH: 0

## 2024-07-08 NOTE — PROGRESS NOTES
in anatomic alignment.  SKIN: Intact without rashes, lesions, or ulcerations. No obvious deformity or swelling.  NEURO: The patient responds to light touch throughout bilateral LE. Patellar and Achilles reflexes are 2/4.  VASC: The bilateral LE is neurovascularly intact with 2/4 DP and 2/4 PT pulses. Brisk capillary refill.  ROM: 0 degree flexion contracture, 90 degrees flexion, 30 degrees abduction, 20 degrees adduction, 20 degrees of internal rotation, 45 degrees of external rotation.  MUSC: Strength is 5/5 flexion, abduction, internal rotation, external rotation.  PALP: The patient is  tender to palpation over the greater trochanter.  TEST: Log roll is negative. No apparent leg length discrepancy. mild Stenchfield test. Lateral pain with FADIR.  Assessment:     1. Greater trochanteric bursitis of right hip    2. Primary osteoarthritis of right knee      Procedures:    Procedure: yes    Greater Trochanteric Bursa Injection     Procedure: Ness Castañeda agreed today for a Corticosteroid injection into the right greater trochanteric bursa. The patient was placed in the lateral position with the affected side up.  The point of the maximum tenderness was marked with the closed end of a click type pen. The skin was prepped with betadine in a sterile fashion. Utilizing a [employer] ultrasound unit with a variable frequency linear transducer and sterile technique a 3 cc solution containing 2cc of 1% lidocaine  and 1 cc containing 80 mg of Depo-Medrol was injected into the greater trochanteric bursa with a 22 gauge spinal needle. The wound was cleansed and a Band-Aid was placed. The patient tolerated the procedure without difficulty. Adverse reactions of the injection was discussed with the patient including signs of infection (increasing pain, redness, swelling) and the patient was instructed to call immediately with any of these symptoms. The images are stored on SD card in the machine until downloaded to the patient's chart.

## 2024-07-09 RX ADMIN — LIDOCAINE HYDROCHLORIDE 2 ML: 10 INJECTION, SOLUTION INFILTRATION; PERINEURAL at 15:52

## 2024-07-09 RX ADMIN — METHYLPREDNISOLONE ACETATE 80 MG: 80 INJECTION, SUSPENSION INTRA-ARTICULAR; INTRALESIONAL; INTRAMUSCULAR; SOFT TISSUE at 15:53

## 2024-07-21 ENCOUNTER — HEALTH MAINTENANCE LETTER (OUTPATIENT)
Age: 74
End: 2024-07-21

## 2024-07-29 ENCOUNTER — OFFICE VISIT (OUTPATIENT)
Dept: ORTHOPEDIC SURGERY | Age: 74
End: 2024-07-29
Payer: COMMERCIAL

## 2024-07-29 VITALS — RESPIRATION RATE: 16 BRPM | WEIGHT: 179.2 LBS | HEIGHT: 66 IN | OXYGEN SATURATION: 98 % | BODY MASS INDEX: 28.8 KG/M2

## 2024-07-29 DIAGNOSIS — M17.0 BILATERAL PRIMARY OSTEOARTHRITIS OF KNEE: Primary | ICD-10-CM

## 2024-07-29 PROCEDURE — 20611 DRAIN/INJ JOINT/BURSA W/US: CPT | Performed by: PHYSICIAN ASSISTANT

## 2024-07-29 RX ORDER — LIDOCAINE HYDROCHLORIDE 10 MG/ML
4 INJECTION, SOLUTION INFILTRATION; PERINEURAL ONCE
Status: COMPLETED | OUTPATIENT
Start: 2024-07-29 | End: 2024-07-29

## 2024-07-29 RX ORDER — METHYLPREDNISOLONE ACETATE 80 MG/ML
80 INJECTION, SUSPENSION INTRA-ARTICULAR; INTRALESIONAL; INTRAMUSCULAR; SOFT TISSUE ONCE
Status: COMPLETED | OUTPATIENT
Start: 2024-07-29 | End: 2024-07-29

## 2024-07-29 RX ADMIN — METHYLPREDNISOLONE ACETATE 80 MG: 80 INJECTION, SUSPENSION INTRA-ARTICULAR; INTRALESIONAL; INTRAMUSCULAR; SOFT TISSUE at 17:02

## 2024-07-29 RX ADMIN — METHYLPREDNISOLONE ACETATE 80 MG: 80 INJECTION, SUSPENSION INTRA-ARTICULAR; INTRALESIONAL; INTRAMUSCULAR; SOFT TISSUE at 17:03

## 2024-07-29 RX ADMIN — LIDOCAINE HYDROCHLORIDE 4 ML: 10 INJECTION, SOLUTION INFILTRATION; PERINEURAL at 17:02

## 2024-07-29 NOTE — PROGRESS NOTES
injection. The wound was cleansed and a band-aid was placed. the patient tolerated the procedure without difficulty. Adverse reactions to the injection were discussed with the patient including signs of infection (increasing pain, redness, swelling) and the patient was instructed to call immediately if experiencing any of these symptoms. Images of the injection site were recorded throughout the procedure and are saved on the SD card which is stored in the [employer] ultrasound unit. All images were downloaded and stored in patient's chart.      Plan:   Patient should return to the clinic in 4 months or PRN to follow up with Linda Barriga PA-C. The patient will call the office immediately with any problems.      Orders Placed This Encounter   Medications    lidocaine 1 % injection 4 mL    methylPREDNISolone acetate (DEPO-MEDROL) injection 80 mg    methylPREDNISolone acetate (DEPO-MEDROL) injection 80 mg       No orders of the defined types were placed in this encounter.          Electronically signed by Linda Barriga PA-C, on 7/29/2024 at 2:57 PM

## 2024-08-21 ENCOUNTER — HOSPITAL ENCOUNTER (OUTPATIENT)
Age: 74
Setting detail: SPECIMEN
Discharge: HOME | End: 2024-08-21

## 2024-08-21 LAB
CRP SERPL HS-MCNC: 133 MG/L (ref 0–5)
ERYTHROCYTE [SEDIMENTATION RATE] IN BLOOD BY PHOTOMETRIC METHOD: 53 MM/HR (ref 0–30)
FERRITIN SERPL-MCNC: 99 NG/ML (ref 13–150)
IMM RETICS NFR: 21.9 % (ref 2.7–18.3)
IRON SATN MFR SERPL: 4 % (ref 20–55)
IRON SERPL-MCNC: 8 UG/DL (ref 37–145)
RETIC HEMOGLOBIN: 23.4 PG (ref 28.2–35.7)
RETICS # AUTO: 0.05 M/UL (ref 0.03–0.08)
RETICS/RBC NFR AUTO: 1.4 % (ref 0.5–1.9)
TIBC SERPL-MCNC: 189 UG/DL (ref 250–450)
UNSATURATED IRON BINDING CAPACITY: 181 UG/DL (ref 112–347)

## 2024-09-11 ENCOUNTER — OFFICE VISIT (OUTPATIENT)
Dept: PRIMARY CARE CLINIC | Age: 74
End: 2024-09-11

## 2024-09-11 VITALS
TEMPERATURE: 100.2 F | WEIGHT: 179 LBS | SYSTOLIC BLOOD PRESSURE: 148 MMHG | HEIGHT: 65 IN | BODY MASS INDEX: 29.82 KG/M2 | OXYGEN SATURATION: 100 % | HEART RATE: 93 BPM | DIASTOLIC BLOOD PRESSURE: 63 MMHG

## 2024-09-11 DIAGNOSIS — J01.90 ACUTE BACTERIAL SINUSITIS: Primary | ICD-10-CM

## 2024-09-11 DIAGNOSIS — B96.89 ACUTE BACTERIAL SINUSITIS: Primary | ICD-10-CM

## 2024-09-11 DIAGNOSIS — R19.7 DIARRHEA IN ADULT PATIENT: ICD-10-CM

## 2024-09-11 RX ORDER — AZITHROMYCIN 250 MG/1
TABLET, FILM COATED ORAL
Qty: 6 TABLET | Refills: 0 | Status: SHIPPED | OUTPATIENT
Start: 2024-09-11 | End: 2024-09-21

## 2024-09-11 RX ORDER — LOPERAMIDE HCL 2 MG
2 CAPSULE ORAL 4 TIMES DAILY PRN
Qty: 20 CAPSULE | Refills: 0 | Status: SHIPPED | OUTPATIENT
Start: 2024-09-11 | End: 2024-09-16

## 2024-09-11 SDOH — ECONOMIC STABILITY: FOOD INSECURITY: WITHIN THE PAST 12 MONTHS, YOU WORRIED THAT YOUR FOOD WOULD RUN OUT BEFORE YOU GOT MONEY TO BUY MORE.: NEVER TRUE

## 2024-09-11 SDOH — ECONOMIC STABILITY: FOOD INSECURITY: WITHIN THE PAST 12 MONTHS, THE FOOD YOU BOUGHT JUST DIDN'T LAST AND YOU DIDN'T HAVE MONEY TO GET MORE.: NEVER TRUE

## 2024-09-11 SDOH — ECONOMIC STABILITY: INCOME INSECURITY: HOW HARD IS IT FOR YOU TO PAY FOR THE VERY BASICS LIKE FOOD, HOUSING, MEDICAL CARE, AND HEATING?: NOT HARD AT ALL

## 2024-09-11 ASSESSMENT — ENCOUNTER SYMPTOMS
RESPIRATORY NEGATIVE: 1
FLATUS: 0
CONSTIPATION: 0
ABDOMINAL DISTENTION: 0
RHINORRHEA: 0
SORE THROAT: 0
EYES NEGATIVE: 1
PHOTOPHOBIA: 0
SHORTNESS OF BREATH: 0
VOICE CHANGE: 0
ANAL BLEEDING: 0
WHEEZING: 0
NAUSEA: 0
BLOOD IN STOOL: 0
TROUBLE SWALLOWING: 0
FACIAL SWELLING: 0
STRIDOR: 0
BACK PAIN: 0
DIARRHEA: 1
APNEA: 0
RECTAL PAIN: 0
EYE PAIN: 0
COUGH: 0
EYE DISCHARGE: 0
BLOATING: 0
EYE ITCHING: 0
EYE REDNESS: 0
SINUS PRESSURE: 0
SINUS PAIN: 0
VOMITING: 1
CHOKING: 0
ABDOMINAL PAIN: 0
COLOR CHANGE: 0
CHEST TIGHTNESS: 0

## 2024-09-11 ASSESSMENT — PATIENT HEALTH QUESTIONNAIRE - PHQ9
SUM OF ALL RESPONSES TO PHQ QUESTIONS 1-9: 0
SUM OF ALL RESPONSES TO PHQ QUESTIONS 1-9: 0
1. LITTLE INTEREST OR PLEASURE IN DOING THINGS: NOT AT ALL
SUM OF ALL RESPONSES TO PHQ QUESTIONS 1-9: 0
SUM OF ALL RESPONSES TO PHQ QUESTIONS 1-9: 0
SUM OF ALL RESPONSES TO PHQ9 QUESTIONS 1 & 2: 0
2. FEELING DOWN, DEPRESSED OR HOPELESS: NOT AT ALL

## 2024-11-07 NOTE — PATIENT INSTRUCTIONS
[facility]    Also Dr. Hawthorne      Botulinum Toxin (Botox/Dysport) Cosmetic Information    Risks: I will have pain, redness, and swelling. I may have bruising, headache or discomfort at the site(s). Risks are asymmetry, numbness, twitching, brow droop, eyelid droop, headache, double vision, not enough effect or too much effect, difficulty whistling or drinking, loss of muscle tone, headache or infection. A touch-up or multiple treatments may be required.    About Botulinum Toxin (Botox/Dysport)  You have inquired about Botox cosmetic. Botulinum toxin is a purified protein derivative developed from bacteria. It has the ability to immobilize facial muscles that create dynamic wrinkles. Dynamic wrinkles develop due to muscle contraction, and over time become permanent folds in the skin, even when the muscles are not flexed. The use of Botox results in a very pleasing cosmetic effect for many people, leading to a more youthful, relaxed appearance. Botox can be used in combination with injectable fillers, chemical peels, and laser resurfacing to treat deeper wrinkles. It also has become an accepted form of treatment for hyperhidrosis, (or excessive sweating) in people who have not responded to other therapies.     With my treatment side effects may include bruising, headache or discomfort at the site(s). Asymmetry may occur and a touch-up may be required. Risks of this procedure include numbness, muscle twitching, brow or eyelid droop, headache, double vision, not enough effect or too much effect, difficulty whistling or drinking from a straw, loss of muscle tone, headache or infection.    Post-Procedure Instructions:   Do not rub the treated area. Avoid exercise for the 24 hours following the procedure. You may move the muscles. Some people will experience bruising or eyelid ptosis (drooping) after injection. This is temporary and usually mild. Eyelid ptosis may be treated with  special eye drops. Call your doctor if you have any questions or concerns after your treatment.     Do not massage the area for 24 hours  Stay upright for 4 hours  Do not push on the injected area  Contact us if you have asymmetry at 2 weeks  Do not apply skin products to the area for 24 hours    Who should I call with questions?  [facility]: [phone]  [facility]: [phone]  For urgent needs outside of business hours call the [facility] at [phone] and ask for the resident on call  MyChart messaging may be delayed

## 2024-11-07 NOTE — PROGRESS NOTES
Botulinum Injection Procedure Note:  Cosmetic      Dermatology Problem List:  1. Rhytides  - s/p Botox  2. Facial volume depletion  - s/p fillers      SURGEON (S): Dr. Anabella Baig          ANESTHESIA:   None    PREOPERATIVE DIAGNOSIS:   Rhytides    LOCATION: crow's feet, chin, upper cutaneous lip         OPERATION/PROCEDURE:   Intralesional botulinum toxin injection     Dilution with 0.6 preserved sterile normal saline in a 50 Unit Botox Vial.    Total units of botulinum toxin: 31    POSTOPERATIVE DIAGNOSIS:   SAME     PREPARATION:   Alcohol swab    DESCRIPTION OF OPERATION/PROCEDURE:   The nature and purpose of the procedure, associated risks including but not limited to bruising, headache or discomfort at the site(s), numbness, muscle twitching, brow or eyelid droop, headache, double vision, not enough effect or too much effect, difficulty whistling or drinking from a straw, loss of muscle tone, or infection. Possible consequences and complications, and alternative methods of treatment were explained in detail. The patient declined a personal or family history of neuromuscular disease prior to the procedure. The patient is not pregnant or breast feeding.    A signed informed operative consent was obtained.    The patient was taken to the operative suite and properly positioned. The area to be treated was defined and confirmed by the patient and physician. The area for Botox injection was marked.    Cosmetic procedure: Injections were performed. The patient tolerated the procedure well and there were no complications noted.         Clinical Follow-Up: 2-3 weeks by phone if needed and otherwise 3-6 months for repeat botox      Recommended 1 syringe Angie Sierra.     Staff Involved:  Scribe/Staff  Scribe Disclosure:   Poonam BOX, am serving as a scribe to document services personally performed by Zaida Baig MD based on data collection and the provider's statements to me.     Provider Disclosure:   The  documentation recorded by the scribe accurately reflects the services I personally performed and the decisions made by me.    Zaida Baig MD    Department of Dermatology  [facility]: Phone: [phone], Fax:[phone]  [facility]: Phone: [phone], Fax: [phone]

## 2024-11-12 ENCOUNTER — OFFICE VISIT (OUTPATIENT)
Dept: ORTHOPEDIC SURGERY | Age: 74
End: 2024-11-12

## 2024-11-12 VITALS — BODY MASS INDEX: 29.32 KG/M2 | OXYGEN SATURATION: 100 % | RESPIRATION RATE: 16 BRPM | HEIGHT: 65 IN | WEIGHT: 176 LBS

## 2024-11-12 DIAGNOSIS — M70.61 GREATER TROCHANTERIC BURSITIS OF RIGHT HIP: Primary | ICD-10-CM

## 2024-11-12 RX ORDER — METHYLPREDNISOLONE ACETATE 80 MG/ML
80 INJECTION, SUSPENSION INTRA-ARTICULAR; INTRALESIONAL; INTRAMUSCULAR; SOFT TISSUE ONCE
Status: COMPLETED | OUTPATIENT
Start: 2024-11-12 | End: 2024-11-12

## 2024-11-12 RX ORDER — LIDOCAINE HYDROCHLORIDE 10 MG/ML
2 INJECTION, SOLUTION INFILTRATION; PERINEURAL ONCE
Status: COMPLETED | OUTPATIENT
Start: 2024-11-12 | End: 2024-11-12

## 2024-11-12 RX ADMIN — LIDOCAINE HYDROCHLORIDE 2 ML: 10 INJECTION, SOLUTION INFILTRATION; PERINEURAL at 15:11

## 2024-11-12 RX ADMIN — METHYLPREDNISOLONE ACETATE 80 MG: 80 INJECTION, SUSPENSION INTRA-ARTICULAR; INTRALESIONAL; INTRAMUSCULAR; SOFT TISSUE at 15:12

## 2024-11-12 ASSESSMENT — ENCOUNTER SYMPTOMS
CHEST TIGHTNESS: 0
CONSTIPATION: 0
DIARRHEA: 0
GASTROINTESTINAL NEGATIVE: 1
ABDOMINAL PAIN: 0
COLOR CHANGE: 0
VOMITING: 0
RESPIRATORY NEGATIVE: 1
ABDOMINAL DISTENTION: 0
COUGH: 0
SHORTNESS OF BREATH: 0
APNEA: 0
NAUSEA: 0

## 2024-11-12 NOTE — PROGRESS NOTES
[facility] ORTHOPEDICS AND SPORTS MEDICINE  [address]  [place] [address]  Dept: [phone]  Dept Fax: [phone]        Ambulatory Follow Up      Subjective:   Ness Castañeda is a 74 y.o. year old female who presents to our office today for routine followup regarding her   1. Greater trochanteric bursitis of right hip    .    Chief Complaint   Patient presents with    Hip Pain     Right LI: 7/8/24         HPI Ness Castañeda  is a 74 y.o.  female who presents today in follow for right hip greater trochanteric bursitis.  The patient was last seen on 7/08/2024 and underwent treatment in the form of greater trochanteric bursa injection and a prescription for physical therapy.   The patient notes 100% improvement with the previous treatment until about 3 weeks ago.   She is not doing her exercises as often as she was that she learned at physical therapy.  She has been taking some Tylenol for pain.    Review of Systems   Constitutional:  Positive for activity change. Negative for appetite change, fatigue and fever.   Respiratory: Negative.  Negative for apnea, cough, chest tightness and shortness of breath.    Cardiovascular: Negative.  Negative for chest pain, palpitations and leg swelling.   Gastrointestinal: Negative.  Negative for abdominal distention, abdominal pain, constipation, diarrhea, nausea and vomiting.   Genitourinary: Negative.  Negative for difficulty urinating, dysuria and hematuria.   Musculoskeletal:  Positive for arthralgias and gait problem. Negative for joint swelling and myalgias.   Skin: Negative.  Negative for color change and rash.   Neurological:  Negative for dizziness, weakness, numbness and headaches.   Psychiatric/Behavioral:  Positive for sleep disturbance.          Objective :   Resp 16   Ht 1.651 m (5' 5\")   Wt 79.8 kg (176 lb)   LMP 02/24/1998   SpO2 100%   BMI 29.29 kg/m²  Body mass index is

## 2024-11-13 ENCOUNTER — OFFICE VISIT (OUTPATIENT)
Dept: DERMATOLOGY | Facility: CLINIC | Age: 74
End: 2024-11-13
Payer: COMMERCIAL

## 2024-11-13 DIAGNOSIS — L98.8 RHYTIDES: Primary | ICD-10-CM

## 2024-11-13 PROCEDURE — 96999 UNLISTED SPEC DERM SVC/PX: CPT | Performed by: DERMATOLOGY

## 2024-11-13 ASSESSMENT — PAIN SCALES - GENERAL: PAINLEVEL_OUTOF10: NO PAIN (0)

## 2024-11-13 NOTE — NURSING NOTE
Dermatology Rooming Note    Alyx Garcia's goals for this visit include:   Chief Complaint   Patient presents with    Derm Problem     Botox - crow's feet     Karis Garsia, EMT

## 2024-11-13 NOTE — LETTER
11/13/2024       RE: Alyx Garcia  [address] [facility] [place] [address]     Dear Colleague,    Thank you for referring your patient, Alyx Garcia, to the [facility] DERMATOLOGY CLINIC [place] at [facility]. Please see a copy of my visit note below.      Botulinum Injection Procedure Note:  Cosmetic      Dermatology Problem List:  1. Rhytides  - s/p Botox  2. Facial volume depletion  - s/p fillers      SURGEON (S): Dr. Anabella Baig          ANESTHESIA:   None    PREOPERATIVE DIAGNOSIS:   Rhytides    LOCATION: crow's feet, chin, upper cutaneous lip         OPERATION/PROCEDURE:   Intralesional botulinum toxin injection     Dilution with 0.6 preserved sterile normal saline in a 50 Unit Botox Vial.    Total units of botulinum toxin: 31    POSTOPERATIVE DIAGNOSIS:   SAME     PREPARATION:   Alcohol swab    DESCRIPTION OF OPERATION/PROCEDURE:   The nature and purpose of the procedure, associated risks including but not limited to bruising, headache or discomfort at the site(s), numbness, muscle twitching, brow or eyelid droop, headache, double vision, not enough effect or too much effect, difficulty whistling or drinking from a straw, loss of muscle tone, or infection. Possible consequences and complications, and alternative methods of treatment were explained in detail. The patient declined a personal or family history of neuromuscular disease prior to the procedure. The patient is not pregnant or breast feeding.    A signed informed operative consent was obtained.    The patient was taken to the operative suite and properly positioned. The area to be treated was defined and confirmed by the patient and physician. The area for Botox injection was marked.    Cosmetic procedure: Injections were performed. The patient tolerated the procedure well and there were no complications noted.         Clinical Follow-Up: 2-3 weeks by phone if needed and otherwise 3-6 months  for repeat botox      Recommended 1 syringe Restalyne Rigo.     Staff Involved:  Scribe/Staff  Scribe Disclosure:   I, Poonam Dion, am serving as a scribe to document services personally performed by Zaida Baig MD based on data collection and the provider's statements to me.     Provider Disclosure:   The documentation recorded by the scribe accurately reflects the services I personally performed and the decisions made by me.    Zaida Baig MD    Department of Dermatology  [facility]: Phone: [phone], Fax:[phone]  [facility]: Phone: [phone], Fax: [phone]     Again, thank you for allowing me to participate in the care of your patient.      Sincerely,    Zaida Baig MD

## 2024-11-13 NOTE — NURSING NOTE
Drug Administration Record    Prior to injection, verified patient identity using patient's name and date of birth.  Due to injection administration, patient instructed to remain in clinic for 15 minutes  afterwards, and to report any adverse reaction to me immediately.    Drug Name: botulinum toxin with expiration date of 10/2026  Dose: Units 31  Route administered: ID  NDC #: 9215-6516-51  Amount of waste(mL):19 units  Reason for waste: Single use vial    LOT #: [id number]  : [employer]  EXPIRATION DATE: 10/2026

## 2024-12-02 NOTE — PATIENT INSTRUCTIONS
PATIENTIQ:  PatientIQ helps [facility] stay in touch with you to know how you're feeling, and provides education and care instructions to you at various time points.   Your answers help your care team track your progress to provide the best care possible. PatientIQ will contact you pre-op and post-op via email or text with:  Educational Videos and Care Instructions  Questionnaires About How You're Feeling    Your participation provides you valuable education and helps [facility] continue to provide quality care to all patients. Thank you    CORTISONE INJECTION CARE    The injection site should never get red, hot, or swollen and if it does the patient will contact our office right away. The patient may experience a increase in soreness the first 24-48 hours due to a cortisone flair and can take anti-inflammatories for a short period of time to reduce that soreness. The patient should not submerge the injection site in water for a minimum of 24 hours to avoid infection. This means no lakes, pools, ponds, or hot tubs for 24 hours. If the patient is diabetic the injection may increase their blood sugar for up to one week. The patient can do this cortisone injection once every 4 months as needed.     -

## 2024-12-02 NOTE — PROGRESS NOTES
[facility] PHYSICIANS [facility] ORTHOPEDICS AND SPORTS MEDICINE  [address]  [place] [address]  Dept: [phone]  Dept Fax: [phone]          Bilateral Knee Visit - Follow up    Subjective:     Chief Complaint   Patient presents with    Knee Pain     Bilateral knee pain-injection requested     HPI:     Ness Castañeda presents today for Bilateral knee pain.  Patient is here today for cortisone injections into Bilateral knees.  She had her last cortisone injection was on 07/29/2024.  She has not had a lubrication injection.    I have reviewed the CC, and if not present in this note, I have reviewed in the patient's chart.   I agree with the documentation provided by other staff and have reviewed their documentation prior to providing my signature indicating agreement.  Vitals:   Resp 17   Ht 1.651 m (5' 5\")   Wt 79.8 kg (176 lb)   LMP 02/24/1998   SpO2 98%   BMI 29.29 kg/m²  Body mass index is 29.29 kg/m².    Assessment:     1. Bilateral primary osteoarthritis of knee        Procedure:   Procedure: Yes     Regular Knee Injection    Location: Bilateral Knee  Procedure: I discussed in detail the risks, benefits and complications of the corticosteroid injection which included but are not limited to: infection, skin reactions, hot swollen, and anaphylaxis with the patient. Ness Castañeda verbalized understanding and they have agreed to have the corticosteroid injection into the bilateral knee. The patient was placed in the Supine position on the exam table. The superior lateral portal was identified and marked with a ball point pen. The skin was prepped with betadine in a sterile fashion. Utilizing a Funderbeam ultrasound unit with a variable frequency linear transducer and clean technique with sterile gloves, a 3 cc solution containing 2 cc of 1% lidocaine   with 1 cc containing 80 mg of Depo-Medrol was injected. There was no resistance to the injection.

## 2024-12-03 ENCOUNTER — OFFICE VISIT (OUTPATIENT)
Dept: ORTHOPEDIC SURGERY | Age: 74
End: 2024-12-03
Payer: COMMERCIAL

## 2024-12-03 VITALS — OXYGEN SATURATION: 98 % | BODY MASS INDEX: 29.32 KG/M2 | HEIGHT: 65 IN | WEIGHT: 176 LBS | RESPIRATION RATE: 17 BRPM

## 2024-12-03 DIAGNOSIS — M17.0 BILATERAL PRIMARY OSTEOARTHRITIS OF KNEE: Primary | ICD-10-CM

## 2024-12-03 PROCEDURE — 20611 DRAIN/INJ JOINT/BURSA W/US: CPT | Performed by: PHYSICIAN ASSISTANT

## 2024-12-03 RX ORDER — METHYLPREDNISOLONE ACETATE 80 MG/ML
80 INJECTION, SUSPENSION INTRA-ARTICULAR; INTRALESIONAL; INTRAMUSCULAR; SOFT TISSUE ONCE
Status: COMPLETED | OUTPATIENT
Start: 2024-12-03 | End: 2024-12-03

## 2024-12-03 RX ORDER — LIDOCAINE HYDROCHLORIDE 10 MG/ML
4 INJECTION, SOLUTION INFILTRATION; PERINEURAL ONCE
Status: COMPLETED | OUTPATIENT
Start: 2024-12-03 | End: 2024-12-03

## 2024-12-03 RX ADMIN — LIDOCAINE HYDROCHLORIDE 4 ML: 10 INJECTION, SOLUTION INFILTRATION; PERINEURAL at 15:45

## 2024-12-03 RX ADMIN — METHYLPREDNISOLONE ACETATE 80 MG: 80 INJECTION, SUSPENSION INTRA-ARTICULAR; INTRALESIONAL; INTRAMUSCULAR; SOFT TISSUE at 15:46

## 2024-12-03 RX ADMIN — METHYLPREDNISOLONE ACETATE 80 MG: 80 INJECTION, SUSPENSION INTRA-ARTICULAR; INTRALESIONAL; INTRAMUSCULAR; SOFT TISSUE at 15:47

## 2025-01-28 ENCOUNTER — HOSPITAL ENCOUNTER (OUTPATIENT)
Age: 75
Setting detail: SPECIMEN
Discharge: HOME | End: 2025-01-28

## 2025-01-28 LAB
ALBUMIN SERPL-MCNC: 3.7 G/DL (ref 3.5–5.2)
ALBUMIN SERPL-MCNC: 3.9 G/DL (ref 3.5–5.2)
ALBUMIN/GLOB SERPL: 1.1 {RATIO} (ref 1–2.5)
ALBUMIN/GLOB SERPL: 1.4 {RATIO} (ref 1–2.5)
ALP SERPL-CCNC: 100 U/L (ref 35–104)
ALP SERPL-CCNC: 96 U/L (ref 35–104)
ALT SERPL-CCNC: 11 U/L (ref 10–35)
ALT SERPL-CCNC: 12 U/L (ref 10–35)
ANION GAP SERPL CALCULATED.3IONS-SCNC: 12 MMOL/L (ref 9–16)
ANION GAP SERPL CALCULATED.3IONS-SCNC: 14 MMOL/L (ref 9–16)
AST SERPL-CCNC: 10 U/L (ref 10–35)
AST SERPL-CCNC: 12 U/L (ref 10–35)
BASOPHILS # BLD: 0.04 K/UL (ref 0–0.2)
BASOPHILS NFR BLD: 1 % (ref 0–2)
BILIRUB SERPL-MCNC: 0.3 MG/DL (ref 0–1.2)
BILIRUB SERPL-MCNC: 0.3 MG/DL (ref 0–1.2)
BUN SERPL-MCNC: 26 MG/DL (ref 8–23)
BUN SERPL-MCNC: 28 MG/DL (ref 8–23)
CALCIUM SERPL-MCNC: 10.8 MG/DL (ref 8.6–10.4)
CALCIUM SERPL-MCNC: 10.9 MG/DL (ref 8.6–10.4)
CHLORIDE SERPL-SCNC: 107 MMOL/L (ref 98–107)
CHLORIDE SERPL-SCNC: 107 MMOL/L (ref 98–107)
CHOLEST SERPL-MCNC: 139 MG/DL (ref 0–199)
CHOLESTEROL/HDL RATIO: 2.7
CO2 SERPL-SCNC: 20 MMOL/L (ref 20–31)
CO2 SERPL-SCNC: 21 MMOL/L (ref 20–31)
CREAT SERPL-MCNC: 1.4 MG/DL (ref 0.6–0.9)
CREAT SERPL-MCNC: 1.5 MG/DL (ref 0.6–0.9)
EOSINOPHIL # BLD: 0.06 K/UL (ref 0–0.44)
EOSINOPHILS RELATIVE PERCENT: 1 % (ref 1–4)
ERYTHROCYTE [DISTWIDTH] IN BLOOD BY AUTOMATED COUNT: 16.4 % (ref 11.8–14.4)
ERYTHROCYTE [DISTWIDTH] IN BLOOD BY AUTOMATED COUNT: 16.5 % (ref 11.8–14.4)
EST. AVERAGE GLUCOSE BLD GHB EST-MCNC: 108 MG/DL
FERRITIN SERPL-MCNC: 136 NG/ML
FOLATE SERPL-MCNC: 11.5 NG/ML (ref 4.8–24.2)
GFR, ESTIMATED: 36 ML/MIN/1.73M2
GFR, ESTIMATED: 39 ML/MIN/1.73M2
GLUCOSE SERPL-MCNC: 101 MG/DL (ref 74–99)
GLUCOSE SERPL-MCNC: 102 MG/DL (ref 74–99)
HBA1C MFR BLD: 5.4 % (ref 4–6)
HCT VFR BLD AUTO: 34.6 % (ref 36.3–47.1)
HCT VFR BLD AUTO: 38.5 % (ref 36.3–47.1)
HDLC SERPL-MCNC: 51 MG/DL
HGB BLD-MCNC: 11 G/DL (ref 11.9–15.1)
HGB BLD-MCNC: 9.7 G/DL (ref 11.9–15.1)
IMM GRANULOCYTES # BLD AUTO: 0.05 K/UL (ref 0–0.3)
IMM GRANULOCYTES NFR BLD: 1 %
IRON SATN MFR SERPL: 6 % (ref 20–55)
IRON SERPL-MCNC: 13 UG/DL (ref 37–145)
LDLC SERPL CALC-MCNC: 66 MG/DL (ref 0–100)
LYMPHOCYTES NFR BLD: 0.91 K/UL (ref 1.1–3.7)
LYMPHOCYTES RELATIVE PERCENT: 11 % (ref 24–43)
MCH RBC QN AUTO: 24.4 PG (ref 25.2–33.5)
MCH RBC QN AUTO: 24.6 PG (ref 25.2–33.5)
MCHC RBC AUTO-ENTMCNC: 28 G/DL (ref 28.4–34.8)
MCHC RBC AUTO-ENTMCNC: 28.6 G/DL (ref 28.4–34.8)
MCV RBC AUTO: 85.9 FL (ref 82.6–102.9)
MCV RBC AUTO: 87.2 FL (ref 82.6–102.9)
MONOCYTES NFR BLD: 0.77 K/UL (ref 0.1–1.2)
MONOCYTES NFR BLD: 9 % (ref 3–12)
NEUTROPHILS NFR BLD: 77 % (ref 36–65)
NEUTS SEG NFR BLD: 6.42 K/UL (ref 1.5–8.1)
NRBC BLD-RTO: 0 PER 100 WBC
NRBC BLD-RTO: 0 PER 100 WBC
PLATELET # BLD AUTO: 460 K/UL (ref 138–453)
PLATELET # BLD AUTO: 490 K/UL (ref 138–453)
PMV BLD AUTO: 9.1 FL (ref 8.1–13.5)
PMV BLD AUTO: 9.5 FL (ref 8.1–13.5)
POTASSIUM SERPL-SCNC: 5.7 MMOL/L (ref 3.7–5.3)
POTASSIUM SERPL-SCNC: 5.8 MMOL/L (ref 3.7–5.3)
PROT SERPL-MCNC: 6.7 G/DL (ref 6.6–8.7)
PROT SERPL-MCNC: 7.1 G/DL (ref 6.6–8.7)
RBC # BLD AUTO: 3.97 M/UL (ref 3.95–5.11)
RBC # BLD AUTO: 4.48 M/UL (ref 3.95–5.11)
RBC # BLD: ABNORMAL 10*6/UL
SODIUM SERPL-SCNC: 140 MMOL/L (ref 136–145)
SODIUM SERPL-SCNC: 141 MMOL/L (ref 136–145)
TIBC SERPL-MCNC: 220 UG/DL (ref 250–450)
TRIGL SERPL-MCNC: 110 MG/DL (ref 0–149)
UNSATURATED IRON BINDING CAPACITY: 207 UG/DL (ref 112–347)
VIT B12 SERPL-MCNC: 748 PG/ML (ref 232–1245)
VLDLC SERPL CALC-MCNC: 22 MG/DL (ref 1–30)
WBC OTHER # BLD: 8.3 K/UL (ref 3.5–11.3)
WBC OTHER # BLD: 8.7 K/UL (ref 3.5–11.3)

## 2025-02-11 ENCOUNTER — HOSPITAL ENCOUNTER (OUTPATIENT)
Dept: PREADMISSION TESTING | Age: 75
Discharge: HOME | End: 2025-02-15
Payer: MEDICARE

## 2025-02-11 VITALS
OXYGEN SATURATION: 99 % | WEIGHT: 163 LBS | DIASTOLIC BLOOD PRESSURE: 69 MMHG | BODY MASS INDEX: 27.83 KG/M2 | HEART RATE: 80 BPM | SYSTOLIC BLOOD PRESSURE: 168 MMHG | RESPIRATION RATE: 16 BRPM | HEIGHT: 64 IN | TEMPERATURE: 98 F

## 2025-02-11 DIAGNOSIS — Z01.818 PRE-OP TESTING: Primary | ICD-10-CM

## 2025-02-11 LAB
ABO + RH BLD: NORMAL
ARM BAND NUMBER: NORMAL
BLOOD BANK SAMPLE EXPIRATION: NORMAL
BLOOD GROUP ANTIBODIES SERPL: NEGATIVE

## 2025-02-11 PROCEDURE — 86901 BLOOD TYPING SEROLOGIC RH(D): CPT

## 2025-02-11 PROCEDURE — 36415 COLL VENOUS BLD VENIPUNCTURE: CPT

## 2025-02-11 PROCEDURE — 86850 RBC ANTIBODY SCREEN: CPT

## 2025-02-11 PROCEDURE — 86900 BLOOD TYPING SEROLOGIC ABO: CPT

## 2025-02-11 PROCEDURE — 93005 ELECTROCARDIOGRAM TRACING: CPT | Performed by: ANESTHESIOLOGY

## 2025-02-11 RX ORDER — METRONIDAZOLE 500 MG/100ML
500 INJECTION, SOLUTION INTRAVENOUS ONCE
OUTPATIENT
Start: 2025-02-26

## 2025-02-11 RX ORDER — CEFAZOLIN SODIUM/WATER 2 G/20 ML
2000 SYRINGE (ML) INTRAVENOUS ONCE
OUTPATIENT
Start: 2025-02-11 | End: 2025-02-11

## 2025-02-11 NOTE — PRE-PROCEDURE INSTRUCTIONS
On the Day of Your Surgery, Wednesday, 2/26/25, Please Arrive At 0900 AM     Enter [facility] through the Main Entrance, take the lobby elevators to the second floor and check in at the Surgery Registration desk.     Continue to take your home medications as you normally do up to and including the night before surgery with the exception of blood thinning medications.    Blood Thinning Medications:  Please stop prescription blood thinning medications such as Apixaban (Eliquis); Clopidogrel (Plavix); Dabigatran (Pradaxa); Prasugrel (Effient); Rivaroxaban (Xarelto); Ticagrelor (Brilinta); Warfarin (Coumadin) only as directed by your surgeon and/or the prescribing physician    Some common examples of other medications that can thin your blood are: Aspirin, Ibuprofen (Advil, Motrin), Naproxen (Aleve), Meloxicam (Mobic), Celecoxib (Celebrex), Fish Oil, many Herbal Supplements.  These medications should usually be stopped at least 7 days prior to surgery.    Melatonin, biotin.    Tylenol is OK to take for pain the week prior to surgery.    Failure to stop certain medications may interfere with your scheduled surgery.    If you receive instructions from your surgeon regarding what medications to stop prior to surgery, please follow those specific instructions.    If You Have Diabetes:  Do not take any of your diabetic medications, (injectables or by mouth) the morning of surgery unless otherwise instructed by the doctor who manages your diabetes. If you are taking insulin, contact the doctor the manages your diabetes for instructions about any changes to your insulin dosages the day before surgery.      Please take the following medication(s) the day of surgery with small sips of water:              None     If you are on medicare and there is a possibility that you will be admitted following surgery:   Please bring your prescription medications in their original bottles with pharmacy label on the day of

## 2025-02-12 LAB
EKG ATRIAL RATE: 74 BPM
EKG P AXIS: 53 DEGREES
EKG P-R INTERVAL: 146 MS
EKG Q-T INTERVAL: 390 MS
EKG QRS DURATION: 132 MS
EKG QTC CALCULATION (BAZETT): 432 MS
EKG R AXIS: 0 DEGREES
EKG T AXIS: 107 DEGREES
EKG VENTRICULAR RATE: 74 BPM

## 2025-02-18 ENCOUNTER — HOSPITAL ENCOUNTER (OUTPATIENT)
Age: 75
Setting detail: SPECIMEN
Discharge: HOME | End: 2025-02-18

## 2025-02-18 LAB
ANION GAP SERPL CALCULATED.3IONS-SCNC: 10 MMOL/L (ref 9–16)
BASOPHILS # BLD: 0.03 K/UL (ref 0–0.2)
BASOPHILS NFR BLD: 0 % (ref 0–2)
BUN SERPL-MCNC: 30 MG/DL (ref 8–23)
CALCIUM SERPL-MCNC: 10.5 MG/DL (ref 8.6–10.4)
CHLORIDE SERPL-SCNC: 106 MMOL/L (ref 98–107)
CO2 SERPL-SCNC: 23 MMOL/L (ref 20–31)
CREAT SERPL-MCNC: 1.5 MG/DL (ref 0.6–0.9)
EOSINOPHIL # BLD: 0.16 K/UL (ref 0–0.44)
EOSINOPHILS RELATIVE PERCENT: 2 % (ref 1–4)
ERYTHROCYTE [DISTWIDTH] IN BLOOD BY AUTOMATED COUNT: 16.8 % (ref 11.8–14.4)
FERRITIN SERPL-MCNC: 52 NG/ML
GFR, ESTIMATED: 36 ML/MIN/1.73M2
GLUCOSE SERPL-MCNC: 115 MG/DL (ref 74–99)
HCT VFR BLD AUTO: 32.5 % (ref 36.3–47.1)
HGB BLD-MCNC: 9.4 G/DL (ref 11.9–15.1)
IMM GRANULOCYTES # BLD AUTO: <0.03 K/UL (ref 0–0.3)
IMM GRANULOCYTES NFR BLD: 0 %
IRON SATN MFR SERPL: 5 % (ref 20–55)
IRON SERPL-MCNC: 11 UG/DL (ref 37–145)
LYMPHOCYTES NFR BLD: 1.59 K/UL (ref 1.1–3.7)
LYMPHOCYTES RELATIVE PERCENT: 21 % (ref 24–43)
MCH RBC QN AUTO: 25.4 PG (ref 25.2–33.5)
MCHC RBC AUTO-ENTMCNC: 28.9 G/DL (ref 28.4–34.8)
MCV RBC AUTO: 87.8 FL (ref 82.6–102.9)
MONOCYTES NFR BLD: 0.88 K/UL (ref 0.1–1.2)
MONOCYTES NFR BLD: 11 % (ref 3–12)
NEUTROPHILS NFR BLD: 66 % (ref 36–65)
NEUTS SEG NFR BLD: 5.04 K/UL (ref 1.5–8.1)
NRBC BLD-RTO: 0 PER 100 WBC
PLATELET # BLD AUTO: 385 K/UL (ref 138–453)
PMV BLD AUTO: 8.4 FL (ref 8.1–13.5)
POTASSIUM SERPL-SCNC: 4.5 MMOL/L (ref 3.7–5.3)
RBC # BLD AUTO: 3.7 M/UL (ref 3.95–5.11)
RBC # BLD: ABNORMAL 10*6/UL
SODIUM SERPL-SCNC: 139 MMOL/L (ref 136–145)
TIBC SERPL-MCNC: 208 UG/DL (ref 250–450)
UNSATURATED IRON BINDING CAPACITY: 197 UG/DL (ref 112–347)
WBC OTHER # BLD: 7.7 K/UL (ref 3.5–11.3)

## 2025-02-26 ENCOUNTER — HOSPITAL ENCOUNTER (INPATIENT)
Age: 75
LOS: 15 days | Discharge: HOME HEALTH | DRG: 329 | End: 2025-03-13
Attending: SURGERY | Admitting: SURGERY
Payer: MEDICARE

## 2025-02-26 ENCOUNTER — ANESTHESIA (OUTPATIENT)
Dept: OPERATING ROOM | Age: 75
End: 2025-02-26
Payer: MEDICARE

## 2025-02-26 ENCOUNTER — ANESTHESIA EVENT (OUTPATIENT)
Dept: OPERATING ROOM | Age: 75
End: 2025-02-26
Payer: MEDICARE

## 2025-02-26 DIAGNOSIS — N17.9 AKI (ACUTE KIDNEY INJURY): ICD-10-CM

## 2025-02-26 DIAGNOSIS — K57.90 DIVERTICULOSIS: ICD-10-CM

## 2025-02-26 DIAGNOSIS — G89.18 POST-OP PAIN: Primary | ICD-10-CM

## 2025-02-26 LAB
GLUCOSE BLD-MCNC: 107 MG/DL (ref 65–105)
GLUCOSE BLD-MCNC: 163 MG/DL (ref 65–105)

## 2025-02-26 PROCEDURE — 7100000001 HC PACU RECOVERY - ADDTL 15 MIN: Performed by: SURGERY

## 2025-02-26 PROCEDURE — 6360000002 HC RX W HCPCS: Performed by: ANESTHESIOLOGY

## 2025-02-26 PROCEDURE — 6360000002 HC RX W HCPCS: Performed by: SURGERY

## 2025-02-26 PROCEDURE — 8E0W4CZ ROBOTIC ASSISTED PROCEDURE OF TRUNK REGION, PERCUTANEOUS ENDOSCOPIC APPROACH: ICD-10-PCS | Performed by: SURGERY

## 2025-02-26 PROCEDURE — 2500000003 HC RX 250 WO HCPCS: Performed by: NURSE ANESTHETIST, CERTIFIED REGISTERED

## 2025-02-26 PROCEDURE — 3E0T3BZ INTRODUCTION OF ANESTHETIC AGENT INTO PERIPHERAL NERVES AND PLEXI, PERCUTANEOUS APPROACH: ICD-10-PCS | Performed by: SURGERY

## 2025-02-26 PROCEDURE — 3700000000 HC ANESTHESIA ATTENDED CARE: Performed by: SURGERY

## 2025-02-26 PROCEDURE — 0DTN0ZZ RESECTION OF SIGMOID COLON, OPEN APPROACH: ICD-10-PCS | Performed by: SURGERY

## 2025-02-26 PROCEDURE — 82947 ASSAY GLUCOSE BLOOD QUANT: CPT

## 2025-02-26 PROCEDURE — 6370000000 HC RX 637 (ALT 250 FOR IP): Performed by: ANESTHESIOLOGY

## 2025-02-26 PROCEDURE — 3600000019 HC SURGERY ROBOT ADDTL 15MIN: Performed by: SURGERY

## 2025-02-26 PROCEDURE — 2580000003 HC RX 258: Performed by: NURSE ANESTHETIST, CERTIFIED REGISTERED

## 2025-02-26 PROCEDURE — 2720000010 HC SURG SUPPLY STERILE: Performed by: SURGERY

## 2025-02-26 PROCEDURE — 3700000001 HC ADD 15 MINUTES (ANESTHESIA): Performed by: SURGERY

## 2025-02-26 PROCEDURE — 3600000009 HC SURGERY ROBOT BASE: Performed by: SURGERY

## 2025-02-26 PROCEDURE — S2900 ROBOTIC SURGICAL SYSTEM: HCPCS | Performed by: SURGERY

## 2025-02-26 PROCEDURE — 88305 TISSUE EXAM BY PATHOLOGIST: CPT

## 2025-02-26 PROCEDURE — 2580000003 HC RX 258: Performed by: ANESTHESIOLOGY

## 2025-02-26 PROCEDURE — 2709999900 HC NON-CHARGEABLE SUPPLY: Performed by: SURGERY

## 2025-02-26 PROCEDURE — 1200000000 HC SEMI PRIVATE

## 2025-02-26 PROCEDURE — 0DJD4ZZ INSPECTION OF LOWER INTESTINAL TRACT, PERCUTANEOUS ENDOSCOPIC APPROACH: ICD-10-PCS | Performed by: SURGERY

## 2025-02-26 PROCEDURE — 6370000000 HC RX 637 (ALT 250 FOR IP): Performed by: SURGERY

## 2025-02-26 PROCEDURE — 2500000003 HC RX 250 WO HCPCS: Performed by: SURGERY

## 2025-02-26 PROCEDURE — P9041 ALBUMIN (HUMAN),5%, 50ML: HCPCS | Performed by: NURSE ANESTHETIST, CERTIFIED REGISTERED

## 2025-02-26 PROCEDURE — 88307 TISSUE EXAM BY PATHOLOGIST: CPT

## 2025-02-26 PROCEDURE — 7100000000 HC PACU RECOVERY - FIRST 15 MIN: Performed by: SURGERY

## 2025-02-26 PROCEDURE — 6360000002 HC RX W HCPCS: Performed by: NURSE ANESTHETIST, CERTIFIED REGISTERED

## 2025-02-26 RX ORDER — OXYCODONE HYDROCHLORIDE 5 MG/1
5 TABLET ORAL EVERY 4 HOURS PRN
Status: DISCONTINUED | OUTPATIENT
Start: 2025-02-26 | End: 2025-03-03

## 2025-02-26 RX ORDER — CEFAZOLIN SODIUM/WATER 2 G/20 ML
2000 SYRINGE (ML) INTRAVENOUS ONCE
Status: DISCONTINUED | OUTPATIENT
Start: 2025-02-26 | End: 2025-02-26 | Stop reason: HOSPADM

## 2025-02-26 RX ORDER — SODIUM CHLORIDE, SODIUM LACTATE, POTASSIUM CHLORIDE, CALCIUM CHLORIDE 600; 310; 30; 20 MG/100ML; MG/100ML; MG/100ML; MG/100ML
INJECTION, SOLUTION INTRAVENOUS
Status: DISCONTINUED | OUTPATIENT
Start: 2025-02-26 | End: 2025-02-26 | Stop reason: SDUPTHER

## 2025-02-26 RX ORDER — PHENYLEPHRINE HCL IN 0.9% NACL 1 MG/10 ML
SYRINGE (ML) INTRAVENOUS
Status: DISCONTINUED | OUTPATIENT
Start: 2025-02-26 | End: 2025-02-26 | Stop reason: SDUPTHER

## 2025-02-26 RX ORDER — OXYCODONE HYDROCHLORIDE 5 MG/1
5 TABLET ORAL
Status: COMPLETED | OUTPATIENT
Start: 2025-02-26 | End: 2025-02-26

## 2025-02-26 RX ORDER — METRONIDAZOLE 500 MG/100ML
500 INJECTION, SOLUTION INTRAVENOUS ONCE
Status: DISCONTINUED | OUTPATIENT
Start: 2025-02-26 | End: 2025-02-26 | Stop reason: HOSPADM

## 2025-02-26 RX ORDER — FAMOTIDINE 20 MG/1
20 TABLET, FILM COATED ORAL 2 TIMES DAILY
Status: DISCONTINUED | OUTPATIENT
Start: 2025-02-26 | End: 2025-02-26 | Stop reason: DRUGHIGH

## 2025-02-26 RX ORDER — ONDANSETRON 2 MG/ML
INJECTION INTRAMUSCULAR; INTRAVENOUS
Status: DISCONTINUED | OUTPATIENT
Start: 2025-02-26 | End: 2025-02-26 | Stop reason: SDUPTHER

## 2025-02-26 RX ORDER — PROCHLORPERAZINE EDISYLATE 5 MG/ML
10 INJECTION INTRAMUSCULAR; INTRAVENOUS
Status: DISCONTINUED | OUTPATIENT
Start: 2025-02-26 | End: 2025-02-26 | Stop reason: HOSPADM

## 2025-02-26 RX ORDER — DEXAMETHASONE SODIUM PHOSPHATE 10 MG/ML
INJECTION, SOLUTION INTRAMUSCULAR; INTRAVENOUS
Status: DISCONTINUED | OUTPATIENT
Start: 2025-02-26 | End: 2025-02-26 | Stop reason: SDUPTHER

## 2025-02-26 RX ORDER — AMLODIPINE BESYLATE 5 MG/1
5 TABLET ORAL
Status: DISCONTINUED | OUTPATIENT
Start: 2025-02-26 | End: 2025-03-13 | Stop reason: HOSPADM

## 2025-02-26 RX ORDER — MORPHINE SULFATE 4 MG/ML
4 INJECTION, SOLUTION INTRAMUSCULAR; INTRAVENOUS
Status: DISCONTINUED | OUTPATIENT
Start: 2025-02-26 | End: 2025-02-28

## 2025-02-26 RX ORDER — FENTANYL CITRATE 50 UG/ML
INJECTION, SOLUTION INTRAMUSCULAR; INTRAVENOUS
Status: DISCONTINUED | OUTPATIENT
Start: 2025-02-26 | End: 2025-02-26 | Stop reason: SDUPTHER

## 2025-02-26 RX ORDER — BUPIVACAINE HYDROCHLORIDE AND EPINEPHRINE 5; 5 MG/ML; UG/ML
INJECTION, SOLUTION EPIDURAL; INTRACAUDAL; PERINEURAL PRN
Status: DISCONTINUED | OUTPATIENT
Start: 2025-02-26 | End: 2025-02-26 | Stop reason: ALTCHOICE

## 2025-02-26 RX ORDER — ONDANSETRON 2 MG/ML
4 INJECTION INTRAMUSCULAR; INTRAVENOUS EVERY 6 HOURS PRN
Status: DISCONTINUED | OUTPATIENT
Start: 2025-02-26 | End: 2025-03-13 | Stop reason: HOSPADM

## 2025-02-26 RX ORDER — SODIUM CHLORIDE 9 MG/ML
INJECTION, SOLUTION INTRAVENOUS CONTINUOUS
Status: DISCONTINUED | OUTPATIENT
Start: 2025-02-26 | End: 2025-02-26 | Stop reason: HOSPADM

## 2025-02-26 RX ORDER — OXYCODONE HYDROCHLORIDE 5 MG/1
10 TABLET ORAL EVERY 4 HOURS PRN
Status: DISCONTINUED | OUTPATIENT
Start: 2025-02-26 | End: 2025-03-03

## 2025-02-26 RX ORDER — POTASSIUM CHLORIDE 1500 MG/1
40 TABLET, EXTENDED RELEASE ORAL PRN
Status: DISCONTINUED | OUTPATIENT
Start: 2025-02-26 | End: 2025-03-13 | Stop reason: HOSPADM

## 2025-02-26 RX ORDER — AMLODIPINE BESYLATE 5 MG/1
5 TABLET ORAL
Status: ON HOLD | COMMUNITY
End: 2025-03-13 | Stop reason: HOSPADM

## 2025-02-26 RX ORDER — ROCURONIUM BROMIDE 10 MG/ML
INJECTION, SOLUTION INTRAVENOUS
Status: DISCONTINUED | OUTPATIENT
Start: 2025-02-26 | End: 2025-02-26 | Stop reason: SDUPTHER

## 2025-02-26 RX ORDER — MORPHINE SULFATE 2 MG/ML
2 INJECTION, SOLUTION INTRAMUSCULAR; INTRAVENOUS
Status: DISCONTINUED | OUTPATIENT
Start: 2025-02-26 | End: 2025-03-03

## 2025-02-26 RX ORDER — POTASSIUM CHLORIDE 7.45 MG/ML
10 INJECTION INTRAVENOUS PRN
Status: DISCONTINUED | OUTPATIENT
Start: 2025-02-26 | End: 2025-03-13 | Stop reason: HOSPADM

## 2025-02-26 RX ORDER — HYDROMORPHONE HYDROCHLORIDE 2 MG/ML
INJECTION, SOLUTION INTRAMUSCULAR; INTRAVENOUS; SUBCUTANEOUS
Status: DISCONTINUED | OUTPATIENT
Start: 2025-02-26 | End: 2025-02-26 | Stop reason: SDUPTHER

## 2025-02-26 RX ORDER — METRONIDAZOLE 500 MG/100ML
500 INJECTION, SOLUTION INTRAVENOUS EVERY 8 HOURS
Status: COMPLETED | OUTPATIENT
Start: 2025-02-26 | End: 2025-03-03

## 2025-02-26 RX ORDER — SODIUM CHLORIDE 9 MG/ML
INJECTION, SOLUTION INTRAVENOUS
Status: DISCONTINUED | OUTPATIENT
Start: 2025-02-26 | End: 2025-02-26 | Stop reason: SDUPTHER

## 2025-02-26 RX ORDER — SODIUM CHLORIDE 9 MG/ML
INJECTION, SOLUTION INTRAVENOUS PRN
Status: DISCONTINUED | OUTPATIENT
Start: 2025-02-26 | End: 2025-02-26 | Stop reason: HOSPADM

## 2025-02-26 RX ORDER — SODIUM CHLORIDE 0.9 % (FLUSH) 0.9 %
5-40 SYRINGE (ML) INJECTION PRN
Status: DISCONTINUED | OUTPATIENT
Start: 2025-02-26 | End: 2025-02-26 | Stop reason: HOSPADM

## 2025-02-26 RX ORDER — SODIUM CHLORIDE, SODIUM LACTATE, POTASSIUM CHLORIDE, CALCIUM CHLORIDE 600; 310; 30; 20 MG/100ML; MG/100ML; MG/100ML; MG/100ML
INJECTION, SOLUTION INTRAVENOUS CONTINUOUS
Status: DISCONTINUED | OUTPATIENT
Start: 2025-02-26 | End: 2025-02-26 | Stop reason: HOSPADM

## 2025-02-26 RX ORDER — DEXTROSE MONOHYDRATE, SODIUM CHLORIDE, AND POTASSIUM CHLORIDE 50; 1.49; 4.5 G/1000ML; G/1000ML; G/1000ML
INJECTION, SOLUTION INTRAVENOUS CONTINUOUS
Status: DISCONTINUED | OUTPATIENT
Start: 2025-02-26 | End: 2025-02-26

## 2025-02-26 RX ORDER — LIDOCAINE HYDROCHLORIDE 20 MG/ML
INJECTION, SOLUTION EPIDURAL; INFILTRATION; INTRACAUDAL; PERINEURAL
Status: DISCONTINUED | OUTPATIENT
Start: 2025-02-26 | End: 2025-02-26 | Stop reason: SDUPTHER

## 2025-02-26 RX ORDER — PROPOFOL 10 MG/ML
INJECTION, EMULSION INTRAVENOUS
Status: DISCONTINUED | OUTPATIENT
Start: 2025-02-26 | End: 2025-02-26 | Stop reason: SDUPTHER

## 2025-02-26 RX ORDER — ALBUMIN HUMAN 50 G/1000ML
SOLUTION INTRAVENOUS
Status: DISCONTINUED | OUTPATIENT
Start: 2025-02-26 | End: 2025-02-26 | Stop reason: SDUPTHER

## 2025-02-26 RX ORDER — CEFAZOLIN SODIUM 1 G/3ML
INJECTION, POWDER, FOR SOLUTION INTRAMUSCULAR; INTRAVENOUS
Status: DISCONTINUED
Start: 2025-02-26 | End: 2025-02-26 | Stop reason: WASHOUT

## 2025-02-26 RX ORDER — METOCLOPRAMIDE HYDROCHLORIDE 5 MG/ML
10 INJECTION INTRAMUSCULAR; INTRAVENOUS
Status: DISCONTINUED | OUTPATIENT
Start: 2025-02-26 | End: 2025-02-26 | Stop reason: HOSPADM

## 2025-02-26 RX ORDER — LISINOPRIL 10 MG/1
10 TABLET ORAL 2 TIMES DAILY
Status: DISCONTINUED | OUTPATIENT
Start: 2025-02-26 | End: 2025-03-13

## 2025-02-26 RX ORDER — KETOROLAC TROMETHAMINE 15 MG/ML
15 INJECTION, SOLUTION INTRAMUSCULAR; INTRAVENOUS EVERY 6 HOURS SCHEDULED
Status: DISCONTINUED | OUTPATIENT
Start: 2025-02-26 | End: 2025-02-27

## 2025-02-26 RX ORDER — FENTANYL CITRATE 50 UG/ML
25 INJECTION, SOLUTION INTRAMUSCULAR; INTRAVENOUS EVERY 5 MIN PRN
Status: DISCONTINUED | OUTPATIENT
Start: 2025-02-26 | End: 2025-02-26 | Stop reason: HOSPADM

## 2025-02-26 RX ORDER — DIPHENHYDRAMINE HYDROCHLORIDE 50 MG/ML
12.5 INJECTION, SOLUTION INTRAMUSCULAR; INTRAVENOUS
Status: DISCONTINUED | OUTPATIENT
Start: 2025-02-26 | End: 2025-02-26 | Stop reason: HOSPADM

## 2025-02-26 RX ORDER — SODIUM CHLORIDE 9 MG/ML
INJECTION, SOLUTION INTRAVENOUS PRN
Status: DISCONTINUED | OUTPATIENT
Start: 2025-02-26 | End: 2025-03-13 | Stop reason: HOSPADM

## 2025-02-26 RX ORDER — ENOXAPARIN SODIUM 100 MG/ML
40 INJECTION SUBCUTANEOUS DAILY
Status: DISCONTINUED | OUTPATIENT
Start: 2025-02-27 | End: 2025-03-13 | Stop reason: HOSPADM

## 2025-02-26 RX ORDER — CEFAZOLIN SODIUM/WATER 2 G/20 ML
2000 SYRINGE (ML) INTRAVENOUS EVERY 8 HOURS
Status: DISCONTINUED | OUTPATIENT
Start: 2025-02-26 | End: 2025-03-01

## 2025-02-26 RX ORDER — NALOXONE HYDROCHLORIDE 0.4 MG/ML
INJECTION, SOLUTION INTRAMUSCULAR; INTRAVENOUS; SUBCUTANEOUS PRN
Status: DISCONTINUED | OUTPATIENT
Start: 2025-02-26 | End: 2025-02-26 | Stop reason: HOSPADM

## 2025-02-26 RX ORDER — METRONIDAZOLE 500 MG/100ML
INJECTION, SOLUTION INTRAVENOUS
Status: DISCONTINUED | OUTPATIENT
Start: 2025-02-26 | End: 2025-02-26 | Stop reason: SDUPTHER

## 2025-02-26 RX ORDER — SODIUM CHLORIDE 0.9 % (FLUSH) 0.9 %
5-40 SYRINGE (ML) INJECTION EVERY 12 HOURS SCHEDULED
Status: DISCONTINUED | OUTPATIENT
Start: 2025-02-26 | End: 2025-02-26 | Stop reason: HOSPADM

## 2025-02-26 RX ORDER — HYDROMORPHONE HYDROCHLORIDE 1 MG/ML
0.5 INJECTION, SOLUTION INTRAMUSCULAR; INTRAVENOUS; SUBCUTANEOUS EVERY 5 MIN PRN
Status: DISCONTINUED | OUTPATIENT
Start: 2025-02-26 | End: 2025-02-26 | Stop reason: HOSPADM

## 2025-02-26 RX ORDER — FERROUS SULFATE 325(65) MG
325 TABLET ORAL
COMMUNITY

## 2025-02-26 RX ORDER — SODIUM CHLORIDE 0.9 % (FLUSH) 0.9 %
5-40 SYRINGE (ML) INJECTION PRN
Status: DISCONTINUED | OUTPATIENT
Start: 2025-02-26 | End: 2025-03-13 | Stop reason: HOSPADM

## 2025-02-26 RX ORDER — DEXTROSE MONOHYDRATE AND SODIUM CHLORIDE 5; .45 G/100ML; G/100ML
INJECTION, SOLUTION INTRAVENOUS CONTINUOUS
Status: DISCONTINUED | OUTPATIENT
Start: 2025-02-26 | End: 2025-03-03

## 2025-02-26 RX ORDER — LIDOCAINE HYDROCHLORIDE 10 MG/ML
1 INJECTION, SOLUTION EPIDURAL; INFILTRATION; INTRACAUDAL; PERINEURAL
Status: DISCONTINUED | OUTPATIENT
Start: 2025-02-27 | End: 2025-02-26 | Stop reason: HOSPADM

## 2025-02-26 RX ORDER — CEFAZOLIN SODIUM 1 G/3ML
INJECTION, POWDER, FOR SOLUTION INTRAMUSCULAR; INTRAVENOUS
Status: DISCONTINUED | OUTPATIENT
Start: 2025-02-26 | End: 2025-02-26 | Stop reason: SDUPTHER

## 2025-02-26 RX ORDER — FAMOTIDINE 20 MG/1
20 TABLET, FILM COATED ORAL DAILY
Status: DISCONTINUED | OUTPATIENT
Start: 2025-02-26 | End: 2025-03-13 | Stop reason: HOSPADM

## 2025-02-26 RX ORDER — SODIUM CHLORIDE 0.9 % (FLUSH) 0.9 %
5-40 SYRINGE (ML) INJECTION EVERY 12 HOURS SCHEDULED
Status: DISCONTINUED | OUTPATIENT
Start: 2025-02-26 | End: 2025-03-13 | Stop reason: HOSPADM

## 2025-02-26 RX ADMIN — FENTANYL CITRATE 50 MCG: 50 INJECTION, SOLUTION INTRAMUSCULAR; INTRAVENOUS at 11:14

## 2025-02-26 RX ADMIN — PROPOFOL 150 MG: 10 INJECTION, EMULSION INTRAVENOUS at 11:14

## 2025-02-26 RX ADMIN — ONDANSETRON 4 MG: 2 INJECTION, SOLUTION INTRAMUSCULAR; INTRAVENOUS at 13:42

## 2025-02-26 RX ADMIN — ALBUMIN (HUMAN) 500 ML: 12.5 INJECTION, SOLUTION INTRAVENOUS at 13:10

## 2025-02-26 RX ADMIN — Medication 100 MCG: at 13:06

## 2025-02-26 RX ADMIN — FENTANYL CITRATE 50 MCG: 50 INJECTION, SOLUTION INTRAMUSCULAR; INTRAVENOUS at 11:53

## 2025-02-26 RX ADMIN — METRONIDAZOLE 500 MG: 500 SOLUTION INTRAVENOUS at 11:26

## 2025-02-26 RX ADMIN — HYDROMORPHONE HYDROCHLORIDE 0.5 MG: 2 INJECTION, SOLUTION INTRAMUSCULAR; INTRAVENOUS; SUBCUTANEOUS at 14:26

## 2025-02-26 RX ADMIN — KETOROLAC TROMETHAMINE 15 MG: 15 INJECTION, SOLUTION INTRAMUSCULAR; INTRAVENOUS at 21:44

## 2025-02-26 RX ADMIN — SODIUM CHLORIDE, SODIUM LACTATE, POTASSIUM CHLORIDE, AND CALCIUM CHLORIDE: .6; .31; .03; .02 INJECTION, SOLUTION INTRAVENOUS at 08:21

## 2025-02-26 RX ADMIN — CEFAZOLIN 2 G: 1 INJECTION, POWDER, FOR SOLUTION INTRAMUSCULAR; INTRAVENOUS at 11:25

## 2025-02-26 RX ADMIN — FENTANYL CITRATE 50 MCG: 50 INJECTION, SOLUTION INTRAMUSCULAR; INTRAVENOUS at 12:32

## 2025-02-26 RX ADMIN — SODIUM CHLORIDE: 9 INJECTION, SOLUTION INTRAVENOUS at 11:26

## 2025-02-26 RX ADMIN — DEXAMETHASONE SODIUM PHOSPHATE 10 MG: 10 INJECTION INTRAMUSCULAR; INTRAVENOUS at 11:26

## 2025-02-26 RX ADMIN — SODIUM CHLORIDE, POTASSIUM CHLORIDE, SODIUM LACTATE AND CALCIUM CHLORIDE: 600; 310; 30; 20 INJECTION, SOLUTION INTRAVENOUS at 12:29

## 2025-02-26 RX ADMIN — HYDROMORPHONE HYDROCHLORIDE 0.5 MG: 2 INJECTION, SOLUTION INTRAMUSCULAR; INTRAVENOUS; SUBCUTANEOUS at 13:52

## 2025-02-26 RX ADMIN — Medication 2000 MG: at 15:36

## 2025-02-26 RX ADMIN — FAMOTIDINE 20 MG: 20 TABLET, FILM COATED ORAL at 21:45

## 2025-02-26 RX ADMIN — METRONIDAZOLE 500 MG: 500 INJECTION, SOLUTION INTRAVENOUS at 15:38

## 2025-02-26 RX ADMIN — AMLODIPINE BESYLATE 5 MG: 5 TABLET ORAL at 21:45

## 2025-02-26 RX ADMIN — FENTANYL CITRATE 50 MCG: 50 INJECTION, SOLUTION INTRAMUSCULAR; INTRAVENOUS at 11:50

## 2025-02-26 RX ADMIN — HYDROMORPHONE HYDROCHLORIDE 0.5 MG: 2 INJECTION, SOLUTION INTRAMUSCULAR; INTRAVENOUS; SUBCUTANEOUS at 14:14

## 2025-02-26 RX ADMIN — Medication 100 MCG: at 13:00

## 2025-02-26 RX ADMIN — Medication 100 MCG: at 11:37

## 2025-02-26 RX ADMIN — ROCURONIUM BROMIDE 20 MG: 10 INJECTION, SOLUTION INTRAVENOUS at 12:16

## 2025-02-26 RX ADMIN — OXYCODONE HYDROCHLORIDE 5 MG: 5 TABLET ORAL at 16:36

## 2025-02-26 RX ADMIN — SUGAMMADEX 200 MG: 100 INJECTION, SOLUTION INTRAVENOUS at 14:05

## 2025-02-26 RX ADMIN — HYDROMORPHONE HYDROCHLORIDE 0.5 MG: 1 INJECTION, SOLUTION INTRAMUSCULAR; INTRAVENOUS; SUBCUTANEOUS at 14:48

## 2025-02-26 RX ADMIN — LIDOCAINE HYDROCHLORIDE 80 MG: 20 INJECTION, SOLUTION EPIDURAL; INFILTRATION; INTRACAUDAL; PERINEURAL at 11:14

## 2025-02-26 RX ADMIN — SODIUM CHLORIDE, POTASSIUM CHLORIDE, SODIUM LACTATE AND CALCIUM CHLORIDE: 600; 310; 30; 20 INJECTION, SOLUTION INTRAVENOUS at 11:07

## 2025-02-26 RX ADMIN — FENTANYL CITRATE 50 MCG: 50 INJECTION, SOLUTION INTRAMUSCULAR; INTRAVENOUS at 12:05

## 2025-02-26 RX ADMIN — Medication 100 MCG: at 13:08

## 2025-02-26 RX ADMIN — ROCURONIUM BROMIDE 50 MG: 10 INJECTION, SOLUTION INTRAVENOUS at 11:14

## 2025-02-26 RX ADMIN — HYDROMORPHONE HYDROCHLORIDE 0.5 MG: 2 INJECTION, SOLUTION INTRAMUSCULAR; INTRAVENOUS; SUBCUTANEOUS at 12:39

## 2025-02-26 RX ADMIN — SODIUM CHLORIDE, PRESERVATIVE FREE 10 ML: 5 INJECTION INTRAVENOUS at 21:46

## 2025-02-26 ASSESSMENT — PAIN DESCRIPTION - LOCATION
LOCATION: ABDOMEN

## 2025-02-26 ASSESSMENT — PAIN SCALES - GENERAL
PAINLEVEL_OUTOF10: 4
PAINLEVEL_OUTOF10: 5
PAINLEVEL_OUTOF10: 7
PAINLEVEL_OUTOF10: 10
PAINLEVEL_OUTOF10: 2

## 2025-02-26 ASSESSMENT — PAIN DESCRIPTION - DESCRIPTORS
DESCRIPTORS: PATIENT UNABLE TO DESCRIBE
DESCRIPTORS: THROBBING
DESCRIPTORS: ACHING

## 2025-02-26 ASSESSMENT — PAIN - FUNCTIONAL ASSESSMENT
PAIN_FUNCTIONAL_ASSESSMENT: ACTIVITIES ARE NOT PREVENTED
PAIN_FUNCTIONAL_ASSESSMENT: 0-10

## 2025-02-26 ASSESSMENT — LIFESTYLE VARIABLES: SMOKING_STATUS: 0

## 2025-02-26 ASSESSMENT — PAIN DESCRIPTION - ORIENTATION: ORIENTATION: LOWER

## 2025-02-26 NOTE — PROGRESS NOTES
Pharmacy Note - Renal dose adjustment made per P/T protocol    Original order:  Pepcid 20 mg bid    Estimated Creatinine Clearance: 32 mL/min (A) (based on SCr of 1.5 mg/dL (H)).    No results for input(s): \"BUN\", \"CREATININE\" in the last 72 hours.    Renally adjusted order:  Pepcid 20 mg once a day    Please call pharmacy with any questions.    Thank you,  Rashawn Chapman [facility]  2/26/2025 6:34 PM

## 2025-02-26 NOTE — ANESTHESIA PRE PROCEDURE
Department of Anesthesiology  Preprocedure Note       Name:  Ness Castañeda   Age:  74 y.o.  :  1950                                          MRN:  [id number]         Date:  2025      Surgeon: Surgeon(s):  Fracisco Barksdale MD    Procedure: Procedure(s):  SIGMOID RESECTION  LAPAROSCOPIC ROBOTIC XI    Medications prior to admission:   Prior to Admission medications    Medication Sig Start Date End Date Taking? Authorizing Provider   ferrous sulfate (IRON 325) 325 (65 Fe) MG tablet Take 1 tablet by mouth daily (with breakfast)   Yes Bhavesh Hannon MD   AMLODIPINE BESYLATE PO Take 5 mg by mouth at bedtime   Yes Bhavesh Hannon MD   biotin 3 MG TABS tablet Take 1 tablet by mouth daily   Yes Bhavesh Hannon MD   docusate sodium (COLACE) 100 MG capsule Take 1 capsule by mouth 2 times daily 17  Yes Kevin Delgado Jr., MD   lisinopril (PRINIVIL;ZESTRIL) 10 MG tablet Take 1 tablet by mouth 2 times daily   Yes Bhavesh Hannon MD   docusate sodium (COLACE) 100 MG capsule Take 1 capsule by mouth daily as needed for Constipation  Patient taking differently: Take 1 capsule by mouth nightly 17  Yes Lorenzo Reyes MD   eszopiclone (LUNESTA) 2 MG TABS Take 1 tablet by mouth nightly as needed.   Yes Bhavesh Hannon MD   Melatonin 10 MG TABS Take 5 mg by mouth nightly   Yes Bhavesh Hannon MD   clobetasol (TEMOVATE) 0.05 % cream Apply topically    Bhavesh Hannon MD   metFORMIN (GLUCOPHAGE-XR) 500 MG extended release tablet Take 1 tablet by mouth daily lunch 18   Bhavesh Hannon MD   naftifine (NAFTIN) 1 % cream Apply topically    Bhavesh Hannon MD   oxyCODONE-acetaminophen (PERCOCET) 5-325 MG per tablet Take 1 tablet by mouth every 6 hours as needed for Pain .  Patient not taking: Reported on 2025   Kevin Delgado Jr., MD   metFORMIN (GLUCOPHAGE) 500 MG tablet Take 1 tablet by mouth Daily with supper    Bhavesh Hannon MD

## 2025-02-26 NOTE — H&P
Interval H&P Note    Pt Name: Ness Castañeda  MRN: [id number]  YOB: 1950  Date of evaluation: 2/26/2025      [x] I have reviewed the hardcopy General Surgery Progress Note by Dr Barksdale dated 1/31/25 and the Cardiac Preoperative Note by Dr Aldrich dated 2/14/25 both labeled in the short chart for the Interval History and Physical note.     [x] I have examined  Ness Castañeda, a 74 y.o. female with PMH of diverticulitis, CKD III, arthritis, HTN, DM, known bladder tumor and right kidney cancer who arrived for the scheduled SIGMOID RESECTION  LAPAROSCOPIC ROBOTIC XI by Fracisco Barksdale MD for Diverticulosis. The patient denies health changes since her PAT visit, fever, chills, wheezing, cough, increased SOB, chest pain, open sores or wounds.  +DM POC        CARDIAC CLEARANCE - DR ALDRICH   Patient seen for LBBB noted on EKG. Patient was seen by Cardiologist She was able to climb 2 flights of stairs without any dyspnea or chest pain while in the office. Patient cleared for her procedure with Dr Barksdale     PM, Surgical History, Social History, Psych, and Family History reviewed and updated in EPIC in appropriate section.      Allergies:  Patient has no known allergies.    Medications:    Prior to Admission medications    Medication Sig Start Date End Date Taking? Authorizing Provider   ferrous sulfate (IRON 325) 325 (65 Fe) MG tablet Take 1 tablet by mouth daily (with breakfast)   Yes Bhavesh Hannon MD   AMLODIPINE BESYLATE PO Take 5 mg by mouth at bedtime   Yes Bhavesh Hannon MD   biotin 3 MG TABS tablet Take 1 tablet by mouth daily   Yes Bhavesh Hannon MD   docusate sodium (COLACE) 100 MG capsule Take 1 capsule by mouth 2 times daily 2/28/17  Yes Kevin Delgado Jr., MD   lisinopril (PRINIVIL;ZESTRIL) 10 MG tablet Take 1 tablet by mouth 2 times daily   Yes Bhavesh Hannon MD   docusate sodium (COLACE) 100 MG capsule Take 1 capsule by mouth daily as needed for Constipation  Patient taking

## 2025-02-26 NOTE — PROGRESS NOTES
Pt admitted to room 2018. Oriented to room, call light and bed mechanics. Side rails up x2. Call light within reach. Orders reviewed.

## 2025-02-27 LAB
ANION GAP SERPL CALCULATED.3IONS-SCNC: 9 MMOL/L (ref 9–16)
BUN SERPL-MCNC: 21 MG/DL (ref 8–23)
CALCIUM SERPL-MCNC: 9.3 MG/DL (ref 8.8–10.2)
CHLORIDE SERPL-SCNC: 107 MMOL/L (ref 98–107)
CO2 SERPL-SCNC: 19 MMOL/L (ref 20–31)
CREAT SERPL-MCNC: 1.4 MG/DL (ref 0.5–0.9)
ERYTHROCYTE [DISTWIDTH] IN BLOOD BY AUTOMATED COUNT: 16.9 % (ref 11.8–14.4)
GFR, ESTIMATED: 38 ML/MIN/1.73M2
GLUCOSE BLD-MCNC: 125 MG/DL (ref 65–105)
GLUCOSE BLD-MCNC: 143 MG/DL (ref 65–105)
GLUCOSE BLD-MCNC: 144 MG/DL (ref 65–105)
GLUCOSE BLD-MCNC: 149 MG/DL (ref 65–105)
GLUCOSE BLD-MCNC: 170 MG/DL (ref 65–105)
GLUCOSE SERPL-MCNC: 149 MG/DL (ref 82–115)
HCT VFR BLD AUTO: 23.4 % (ref 36.3–47.1)
HCT VFR BLD AUTO: 24.4 % (ref 36.3–47.1)
HGB BLD-MCNC: 6.8 G/DL (ref 11.9–15.1)
HGB BLD-MCNC: 7.1 G/DL (ref 11.9–15.1)
MCH RBC QN AUTO: 25.5 PG (ref 25.2–33.5)
MCHC RBC AUTO-ENTMCNC: 29.1 G/DL (ref 28.4–34.8)
MCV RBC AUTO: 87.6 FL (ref 82.6–102.9)
PLATELET # BLD AUTO: 380 K/UL (ref 138–453)
PMV BLD AUTO: 9.1 FL (ref 8.1–13.5)
POTASSIUM SERPL-SCNC: 4.5 MMOL/L (ref 3.7–5.3)
RBC # BLD AUTO: 2.67 M/UL (ref 3.95–5.11)
SODIUM SERPL-SCNC: 135 MMOL/L (ref 136–145)
WBC OTHER # BLD: 20.8 K/UL (ref 3.5–11.3)

## 2025-02-27 PROCEDURE — 36415 COLL VENOUS BLD VENIPUNCTURE: CPT

## 2025-02-27 PROCEDURE — 82947 ASSAY GLUCOSE BLOOD QUANT: CPT

## 2025-02-27 PROCEDURE — 6370000000 HC RX 637 (ALT 250 FOR IP): Performed by: SURGERY

## 2025-02-27 PROCEDURE — 2580000003 HC RX 258

## 2025-02-27 PROCEDURE — 6360000002 HC RX W HCPCS: Performed by: SURGERY

## 2025-02-27 PROCEDURE — 85018 HEMOGLOBIN: CPT

## 2025-02-27 PROCEDURE — 2580000003 HC RX 258: Performed by: SURGERY

## 2025-02-27 PROCEDURE — 80048 BASIC METABOLIC PNL TOTAL CA: CPT

## 2025-02-27 PROCEDURE — 1200000000 HC SEMI PRIVATE

## 2025-02-27 PROCEDURE — 6360000002 HC RX W HCPCS

## 2025-02-27 PROCEDURE — 85014 HEMATOCRIT: CPT

## 2025-02-27 PROCEDURE — 85027 COMPLETE CBC AUTOMATED: CPT

## 2025-02-27 RX ORDER — INSULIN LISPRO 100 [IU]/ML
0-4 INJECTION, SOLUTION INTRAVENOUS; SUBCUTANEOUS
Status: DISCONTINUED | OUTPATIENT
Start: 2025-02-27 | End: 2025-03-05

## 2025-02-27 RX ORDER — DEXTROSE MONOHYDRATE 100 MG/ML
INJECTION, SOLUTION INTRAVENOUS CONTINUOUS PRN
Status: DISCONTINUED | OUTPATIENT
Start: 2025-02-27 | End: 2025-03-13 | Stop reason: HOSPADM

## 2025-02-27 RX ADMIN — DEXTROSE AND SODIUM CHLORIDE: 5; .45 INJECTION, SOLUTION INTRAVENOUS at 21:06

## 2025-02-27 RX ADMIN — KETOROLAC TROMETHAMINE 15 MG: 15 INJECTION, SOLUTION INTRAMUSCULAR; INTRAVENOUS at 04:04

## 2025-02-27 RX ADMIN — METRONIDAZOLE 500 MG: 500 INJECTION, SOLUTION INTRAVENOUS at 00:57

## 2025-02-27 RX ADMIN — LISINOPRIL 10 MG: 10 TABLET ORAL at 08:44

## 2025-02-27 RX ADMIN — OXYCODONE HYDROCHLORIDE 10 MG: 5 TABLET ORAL at 16:57

## 2025-02-27 RX ADMIN — FAMOTIDINE 20 MG: 20 TABLET, FILM COATED ORAL at 08:44

## 2025-02-27 RX ADMIN — AMLODIPINE BESYLATE 5 MG: 5 TABLET ORAL at 21:03

## 2025-02-27 RX ADMIN — Medication 2000 MG: at 00:52

## 2025-02-27 RX ADMIN — METRONIDAZOLE 500 MG: 500 INJECTION, SOLUTION INTRAVENOUS at 08:52

## 2025-02-27 RX ADMIN — SODIUM CHLORIDE 125 MG: 9 INJECTION, SOLUTION INTRAVENOUS at 11:39

## 2025-02-27 RX ADMIN — Medication 2000 MG: at 08:45

## 2025-02-27 RX ADMIN — Medication 2000 MG: at 16:54

## 2025-02-27 RX ADMIN — METRONIDAZOLE 500 MG: 500 INJECTION, SOLUTION INTRAVENOUS at 16:59

## 2025-02-27 RX ADMIN — ENOXAPARIN SODIUM 40 MG: 100 INJECTION SUBCUTANEOUS at 05:47

## 2025-02-27 RX ADMIN — DEXTROSE AND SODIUM CHLORIDE: 5; .45 INJECTION, SOLUTION INTRAVENOUS at 02:46

## 2025-02-27 RX ADMIN — OXYCODONE HYDROCHLORIDE 10 MG: 5 TABLET ORAL at 22:08

## 2025-02-27 RX ADMIN — OXYCODONE HYDROCHLORIDE 5 MG: 5 TABLET ORAL at 08:47

## 2025-02-27 ASSESSMENT — PAIN DESCRIPTION - ONSET: ONSET: ON-GOING

## 2025-02-27 ASSESSMENT — PAIN SCALES - GENERAL
PAINLEVEL_OUTOF10: 7
PAINLEVEL_OUTOF10: 5
PAINLEVEL_OUTOF10: 7
PAINLEVEL_OUTOF10: 5
PAINLEVEL_OUTOF10: 3
PAINLEVEL_OUTOF10: 4

## 2025-02-27 ASSESSMENT — PAIN - FUNCTIONAL ASSESSMENT
PAIN_FUNCTIONAL_ASSESSMENT: ACTIVITIES ARE NOT PREVENTED

## 2025-02-27 ASSESSMENT — PAIN DESCRIPTION - LOCATION
LOCATION: ABDOMEN

## 2025-02-27 ASSESSMENT — PAIN DESCRIPTION - DESCRIPTORS
DESCRIPTORS: ACHING
DESCRIPTORS: DISCOMFORT
DESCRIPTORS: ACHING

## 2025-02-27 ASSESSMENT — PAIN DESCRIPTION - ORIENTATION
ORIENTATION: MID
ORIENTATION: MID

## 2025-02-27 ASSESSMENT — PAIN DESCRIPTION - FREQUENCY: FREQUENCY: CONTINUOUS

## 2025-02-27 ASSESSMENT — PAIN DESCRIPTION - PAIN TYPE: TYPE: SURGICAL PAIN

## 2025-02-27 NOTE — PROGRESS NOTES
General Surgery:  Daily Progress Note          POD # 1 s/p robot-assisted laparoscopic converted to open left sigmoid resection          PATIENT NAME: Ness Castañeda     TODAY'S DATE: 2/27/2025, 5:49 AM    SUBJECTIVE:     Pt seen and examined at bedside.  No acute overnight events.  Afebrile, hemodynamically stable.  Patient reports some mild incisional pain.  No other complaints this time.  Denies nausea, vomiting.  Tolerating small amounts of clear liquids.  Denies passing any flatus, or any bowel function.  She has been up and ambulating in the room. SOFÍA drain in place with 265 mL of serosanguineous output.    OBJECTIVE:   VITALS:  BP (!) 130/55   Pulse 78   Temp 98.1 °F (36.7 °C) (Oral)   Resp 16   Ht 1.651 m (5' 5\")   Wt 73.9 kg (163 lb)   LMP 02/24/1998   SpO2 92%   BMI 27.12 kg/m²      INTAKE/OUTPUT:      Intake/Output Summary (Last 24 hours) at 2/27/2025 0549  Last data filed at 2/27/2025 0406  Gross per 24 hour   Intake 2700 ml   Output 1115 ml   Net 1585 ml       PHYSICAL EXAM:  General Appearance: awake, alert, oriented, in no acute distress  HEENT:  Normocephalic, atraumatic, mucus membranes moist   Heart: Heart regular rate and rhythm  Lungs: Normal respiratory effort, no wheezing or stridor.  Abdomen: Soft, nondistended.  Mild incisional pain.  Surgical dressings in place, scant amount of strikethrough.  RLQ SOFÍA drain in place.   Extremities: No cyanosis, pitting edema, rashes noted.    Skin: Skin color, texture, turgor normal. No rashes or lesions.    Data:  CBC with Differential:    Lab Results   Component Value Date/Time    WBC 7.7 02/18/2025 03:15 PM    RBC 3.70 02/18/2025 03:15 PM    HGB 9.4 02/18/2025 03:15 PM    HCT 32.5 02/18/2025 03:15 PM     02/18/2025 03:15 PM    MCV 87.8 02/18/2025 03:15 PM    MCH 25.4 02/18/2025 03:15 PM    MCHC 28.9 02/18/2025 03:15 PM    RDW 16.8 02/18/2025 03:15 PM    LYMPHOPCT 21 02/18/2025 03:15 PM    MONOPCT 11 02/18/2025 03:15 PM    EOSPCT 2 02/18/2025

## 2025-02-27 NOTE — PLAN OF CARE
Patient POD from Open Sigmoid Resection with SOFÍA drain, SOFÍA drain was leaking around site MD notified and dressing changed. Patient almanzar was removed this shift and patient voided. Patient continues on IV antibiotics. Patient walked in halls this shift. Patient tolerating clear liquid diet, reports no gas as of yet.   Problem: Discharge Planning  Goal: Discharge to home or other facility with appropriate resources  Outcome: Progressing  Flowsheets (Taken 2/27/2025 5234)  Discharge to home or other facility with appropriate resources: Identify barriers to discharge with patient and caregiver     Problem: Pain  Goal: Verbalizes/displays adequate comfort level or baseline comfort level  Outcome: Progressing  Flowsheets (Taken 2/27/2025 7707)  Verbalizes/displays adequate comfort level or baseline comfort level:   Encourage patient to monitor pain and request assistance   Assess pain using appropriate pain scale   Administer analgesics based on type and severity of pain and evaluate response     Problem: Safety - Adult  Goal: Free from fall injury  Outcome: Progressing

## 2025-02-27 NOTE — DISCHARGE INSTR - COC
history on file for this patient.    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    Malignant neoplasm of right kidney (HCC) C64.1    Diverticular disease K57.90       Isolation/Infection:   Isolation            No Isolation          Patient Infection Status       None to display            Nurse Assessment:  Last Vital Signs: BP (!) 129/56   Pulse 76   Temp 97.9 °F (36.6 °C) (Oral)   Resp 18   Ht 1.651 m (5' 5\")   Wt 73.9 kg (163 lb)   LMP 02/24/1998   SpO2 93%   BMI 27.12 kg/m²     Last documented pain score (0-10 scale): Pain Level: 5  Last Weight:   Wt Readings from Last 1 Encounters:   02/26/25 73.9 kg (163 lb)     Mental Status:  oriented, alert, coherent, logical, thought processes intact, and able to concentrate and follow conversation    IV Access:  - None    Nursing Mobility/ADLs:  Walking   Assisted  Transfer  Assisted  Bathing  Assisted  Dressing  Independent  Toileting  Independent  Feeding  Independent  Med Admin  Independent  Med Delivery   whole    Wound Care Documentation and Therapy:  Incision 02/27/17 Abdomen Right (Active)   Number of days: 2922       Incision 02/27/17 Abdomen (Active)   Number of days: 2922       Incision 02/26/25 Abdomen Medial;Upper (Active)   Dressing Status Clean;Dry;Intact 02/27/25 0853   Dressing/Treatment Dry dressing 02/27/25 0853   Closure Other (Comment) 02/27/25 0853   Margins Other (Comment) 02/27/25 0853   Incision Assessment Other (Comment) 02/27/25 0853   Drainage Amount Moderate (25-50%) 02/26/25 1715   Number of days: 1       Incision 02/26/25 Abdomen Lower;Medial (Active)   Dressing Status Clean;Dry;Intact 02/27/25 0853   Closure Other (Comment) 02/27/25 0853   Margins Other (Comment) 02/27/25 0853   Incision Assessment Other (Comment) 02/27/25 0853   Drainage Amount Moderate (25-50%) 02/27/25 0400   Drainage  SECTION    Prognosis: Good    Condition at Discharge: Stable    Rehab Potential (if transferring to Rehab): Good    Recommended Labs or Other Treatments After Discharge: Physical therapy    Physician Certification: I certify the above information and transfer of Ness Castañeda  is necessary for the continuing treatment of the diagnosis listed and that she requires Home Care for less 30 days.     Update Admission H&P: No change in H&P    PHYSICIAN SIGNATURE:  Electronically signed by Fracisco Barksdale MD on 3/13/25 at 3:20 PM EDT

## 2025-02-27 NOTE — CARE COORDINATION
Case Management Assessment  Initial Evaluation    Date/Time of Evaluation: 2/27/2025 9:02 AM  Assessment Completed by: Cheryle Hickey    If patient is discharged prior to next notation, then this note serves as note for discharge by case management.    Patient Name: Ness Castañeda                   YOB: 1950  Diagnosis: Diverticulosis [K57.90]  Diverticular disease [K57.90]                   Date / Time: 2/26/2025  7:31 AM    Patient Admission Status: Inpatient   Readmission Risk (Low < 19, Mod (19-27), High > 27): Readmission Risk Score: 12    Current PCP: Eri Akbar MD  PCP verified by CM? (P) Yes    Chart Reviewed: Yes      History Provided by: Patient  Patient Orientation: Alert and Oriented    Patient Cognition: Alert    Hospitalization in the last 30 days (Readmission):  No    If yes, Readmission Assessment in CM Navigator will be completed.    Advance Directives:      Code Status: Full Code   Patient's Primary Decision Maker is: (P) Legal Next of Kin (has ACP, not on file)      Discharge Planning:    Patient lives with: (P) Children, Spouse/Significant Other Type of Home: (P) House  Primary Care Giver: Self  Patient Support Systems include: Spouse/Significant Other, Children, Family Members   Current Financial resources: (P) Medicare  Current community resources:    Current services prior to admission: (P) Other (Comment) ([facility] for )            Current DME:              Type of Home Care services:  Nursing Services    ADLS  Prior functional level: (P) Independent in ADLs/IADLs  Current functional level: (P) Independent in ADLs/IADLs    PT AM-PAC:   /24  OT AM-PAC:   /24    Family can provide assistance at DC: (P) Yes  Would you like Case Management to discuss the discharge plan with any other family members/significant others, and if so, who? (P) Yes (daughters, )  Plans to Return to Present Housing: (P) Yes  Other Identified Issues/Barriers to RETURNING to current housing:  and/or Patient Representative Agree with the Discharge Plan? (P) Yes    Cheryle Hickey  Case Management Department    0923: Ya from [facility] called accepting patient if needed.

## 2025-02-27 NOTE — FLOWSHEET NOTE
02/26/25 2000   Mobility   Activity Ambulate in luther   Level of Assistance Minimal assist, patient does 75% or more   Assistive Device Front wheel walker   Distance Ambulated (ft) 100 ft   Ambulation Response Tolerated well

## 2025-02-27 NOTE — CONSULTS
COLONOSCOPY  2006    DILATION AND CURETTAGE OF UTERUS  1995    ESOPHAGOGASTRODUODENOSCOPY      PARTIAL NEPHRECTOMY Right 02/27/2017    radical neph    PARTIAL NEPHRECTOMY Right 02/27/2017    NEPHRECTOMY PARTIAL, CONVERTED TO RADICAL SI ROBOTIC, INTRAOP ULTRASOUND  performed by Agustin Garrido MD at [facility] OR    SIGMOID COLECTOMY N/A 2/26/2025    LEFT LAPAROSCOPIC ROBOTIC XI ASSITED OPEN SIGMOID RESECTION WITH TAP BLOCK performed by Fracisco Barksdale MD at [facility] OR    SKIN BIOPSY      SKIN CANCER EXCISION      nose    TONSILLECTOMY  1954    T&A       Home Medications:   No current facility-administered medications on file prior to encounter.     Current Outpatient Medications on File Prior to Encounter   Medication Sig Dispense Refill    ferrous sulfate (IRON 325) 325 (65 Fe) MG tablet Take 1 tablet by mouth daily (with breakfast)      amLODIPine (NORVASC) 5 MG tablet Take 1 tablet by mouth nightly      docusate sodium (COLACE) 100 MG capsule Take 1 capsule by mouth 2 times daily 14 capsule 0    lisinopril (PRINIVIL;ZESTRIL) 10 MG tablet Take 1 tablet by mouth 2 times daily      docusate sodium (COLACE) 100 MG capsule Take 1 capsule by mouth daily as needed for Constipation (Patient taking differently: Take 1 capsule by mouth nightly) 40 capsule 1    eszopiclone (LUNESTA) 2 MG TABS Take 1 tablet by mouth nightly as needed.      Melatonin 10 MG TABS Take 5 mg by mouth nightly      clobetasol (TEMOVATE) 0.05 % cream Apply topically      metFORMIN (GLUCOPHAGE-XR) 500 MG extended release tablet Take 1 tablet by mouth daily lunch      naftifine (NAFTIN) 1 % cream Apply topically      oxyCODONE-acetaminophen (PERCOCET) 5-325 MG per tablet Take 1 tablet by mouth every 6 hours as needed for Pain . (Patient not taking: Reported on 2/26/2025) 20 tablet 0    metFORMIN (GLUCOPHAGE) 500 MG tablet Take 1 tablet by mouth Daily with supper      Cholecalciferol (VITAMIN D-3 PO) Take 2,000 Units by mouth nightly          Allergies:   20.8*  --    HGB 6.8* 7.1*   HCT 23.4* 24.4*     --      Recent Labs     02/27/25  0536   *   K 4.5      CO2 19*   BUN 21   CREATININE 1.4*   CALCIUM 9.3     No results for input(s): \"AST\", \"ALT\", \"BILIDIR\", \"BILITOT\", \"ALKPHOS\" in the last 72 hours.  No results for input(s): \"INR\" in the last 72 hours.  No results for input(s): \"CKTOTAL\", \"TROPONINI\" in the last 72 hours.    Urinalysis:    Lab Results   Component Value Date/Time    NITRU NEGATIVE 06/24/2024 11:31 AM    WBCUA 0 TO 2 01/04/2023 09:40 AM    BACTERIA FEW 11/08/2016 11:05 AM    RBCUA 0 TO 2 01/04/2023 09:40 AM    GLUCOSEU NEGATIVE 06/24/2024 11:31 AM       Radiology:   No orders to display     No results found.      EKG:     Electronically signed by Mohsin Mohammad Reza, MD on 2/27/2025 at 9:11 AM

## 2025-02-27 NOTE — FLOWSHEET NOTE
02/27/25 0230   Mobility   Activity Ambulate in luther   Level of Assistance Minimal assist, patient does 75% or more   Assistive Device Front wheel walker   Distance Ambulated (ft) 200 ft   Ambulation Response Tolerated well

## 2025-02-28 ENCOUNTER — APPOINTMENT (OUTPATIENT)
Dept: GENERAL RADIOLOGY | Age: 75
DRG: 329 | End: 2025-02-28
Attending: SURGERY
Payer: MEDICARE

## 2025-02-28 LAB
ANION GAP SERPL CALCULATED.3IONS-SCNC: 10 MMOL/L (ref 9–16)
BASOPHILS # BLD: 0 K/UL (ref 0–0.2)
BASOPHILS NFR BLD: 0 % (ref 0–2)
BUN SERPL-MCNC: 16 MG/DL (ref 8–23)
CALCIUM SERPL-MCNC: 9.2 MG/DL (ref 8.8–10.2)
CHLORIDE SERPL-SCNC: 105 MMOL/L (ref 98–107)
CO2 SERPL-SCNC: 19 MMOL/L (ref 20–31)
CREAT SERPL-MCNC: 1.4 MG/DL (ref 0.5–0.9)
EOSINOPHIL # BLD: 0 K/UL (ref 0–0.44)
EOSINOPHILS RELATIVE PERCENT: 0 % (ref 1–4)
ERYTHROCYTE [DISTWIDTH] IN BLOOD BY AUTOMATED COUNT: 17 % (ref 11.8–14.4)
GFR, ESTIMATED: 39 ML/MIN/1.73M2
GLUCOSE BLD-MCNC: 127 MG/DL (ref 65–105)
GLUCOSE BLD-MCNC: 133 MG/DL (ref 65–105)
GLUCOSE BLD-MCNC: 142 MG/DL (ref 65–105)
GLUCOSE BLD-MCNC: 166 MG/DL (ref 65–105)
GLUCOSE SERPL-MCNC: 134 MG/DL (ref 82–115)
HCT VFR BLD AUTO: 23.4 % (ref 36.3–47.1)
HGB BLD-MCNC: 7 G/DL (ref 11.9–15.1)
IMM GRANULOCYTES # BLD AUTO: 0.45 K/UL (ref 0–0.3)
IMM GRANULOCYTES NFR BLD: 2 %
LYMPHOCYTES NFR BLD: 0.91 K/UL (ref 1.1–3.7)
LYMPHOCYTES RELATIVE PERCENT: 4 % (ref 24–43)
MCH RBC QN AUTO: 25.8 PG (ref 25.2–33.5)
MCHC RBC AUTO-ENTMCNC: 29.9 G/DL (ref 28.4–34.8)
MCV RBC AUTO: 86.3 FL (ref 82.6–102.9)
MONOCYTES NFR BLD: 1.14 K/UL (ref 0.1–1.2)
MONOCYTES NFR BLD: 5 % (ref 3–12)
MORPHOLOGY: ABNORMAL
NEUTROPHILS NFR BLD: 89 % (ref 36–65)
NEUTS SEG NFR BLD: 20.2 K/UL (ref 1.5–8.1)
NRBC BLD-RTO: 0 PER 100 WBC
PLATELET # BLD AUTO: 379 K/UL (ref 138–453)
PMV BLD AUTO: 8.8 FL (ref 8.1–13.5)
POTASSIUM SERPL-SCNC: 4.1 MMOL/L (ref 3.7–5.3)
RBC # BLD AUTO: 2.71 M/UL (ref 3.95–5.11)
SODIUM SERPL-SCNC: 134 MMOL/L (ref 136–145)
SURGICAL PATHOLOGY REPORT: NORMAL
WBC OTHER # BLD: 22.7 K/UL (ref 3.5–11.3)

## 2025-02-28 PROCEDURE — 6360000002 HC RX W HCPCS: Performed by: SURGERY

## 2025-02-28 PROCEDURE — 74018 RADEX ABDOMEN 1 VIEW: CPT

## 2025-02-28 PROCEDURE — 82947 ASSAY GLUCOSE BLOOD QUANT: CPT

## 2025-02-28 PROCEDURE — 80048 BASIC METABOLIC PNL TOTAL CA: CPT

## 2025-02-28 PROCEDURE — 85025 COMPLETE CBC W/AUTO DIFF WBC: CPT

## 2025-02-28 PROCEDURE — 1200000000 HC SEMI PRIVATE

## 2025-02-28 PROCEDURE — 2580000003 HC RX 258

## 2025-02-28 PROCEDURE — 36415 COLL VENOUS BLD VENIPUNCTURE: CPT

## 2025-02-28 PROCEDURE — 2580000003 HC RX 258: Performed by: STUDENT IN AN ORGANIZED HEALTH CARE EDUCATION/TRAINING PROGRAM

## 2025-02-28 PROCEDURE — 6360000002 HC RX W HCPCS

## 2025-02-28 PROCEDURE — 6370000000 HC RX 637 (ALT 250 FOR IP): Performed by: SURGERY

## 2025-02-28 RX ADMIN — SODIUM CHLORIDE 125 MG: 9 INJECTION, SOLUTION INTRAVENOUS at 11:40

## 2025-02-28 RX ADMIN — Medication 2000 MG: at 17:10

## 2025-02-28 RX ADMIN — OXYCODONE HYDROCHLORIDE 10 MG: 5 TABLET ORAL at 05:16

## 2025-02-28 RX ADMIN — Medication 2000 MG: at 00:59

## 2025-02-28 RX ADMIN — METRONIDAZOLE 500 MG: 500 INJECTION, SOLUTION INTRAVENOUS at 17:14

## 2025-02-28 RX ADMIN — METRONIDAZOLE 500 MG: 500 INJECTION, SOLUTION INTRAVENOUS at 08:55

## 2025-02-28 RX ADMIN — METRONIDAZOLE 500 MG: 500 INJECTION, SOLUTION INTRAVENOUS at 01:06

## 2025-02-28 RX ADMIN — ONDANSETRON 4 MG: 2 INJECTION, SOLUTION INTRAMUSCULAR; INTRAVENOUS at 08:47

## 2025-02-28 RX ADMIN — FAMOTIDINE 20 MG: 20 TABLET, FILM COATED ORAL at 08:49

## 2025-02-28 RX ADMIN — Medication 2000 MG: at 08:50

## 2025-02-28 RX ADMIN — MORPHINE SULFATE 2 MG: 2 INJECTION, SOLUTION INTRAMUSCULAR; INTRAVENOUS at 20:57

## 2025-02-28 RX ADMIN — LISINOPRIL 10 MG: 10 TABLET ORAL at 08:49

## 2025-02-28 RX ADMIN — DEXTROSE AND SODIUM CHLORIDE: 5; .45 INJECTION, SOLUTION INTRAVENOUS at 13:07

## 2025-02-28 ASSESSMENT — PAIN DESCRIPTION - FREQUENCY
FREQUENCY: CONTINUOUS
FREQUENCY: CONTINUOUS

## 2025-02-28 ASSESSMENT — PAIN DESCRIPTION - ONSET
ONSET: ON-GOING
ONSET: ON-GOING

## 2025-02-28 ASSESSMENT — PAIN DESCRIPTION - ORIENTATION
ORIENTATION: MID

## 2025-02-28 ASSESSMENT — PAIN DESCRIPTION - DESCRIPTORS
DESCRIPTORS: ACHING;DULL
DESCRIPTORS: DISCOMFORT
DESCRIPTORS: ACHING;DISCOMFORT

## 2025-02-28 ASSESSMENT — PAIN DESCRIPTION - PAIN TYPE
TYPE: SURGICAL PAIN

## 2025-02-28 ASSESSMENT — PAIN - FUNCTIONAL ASSESSMENT
PAIN_FUNCTIONAL_ASSESSMENT: ACTIVITIES ARE NOT PREVENTED
PAIN_FUNCTIONAL_ASSESSMENT: ACTIVITIES ARE NOT PREVENTED

## 2025-02-28 ASSESSMENT — PAIN SCALES - GENERAL
PAINLEVEL_OUTOF10: 8
PAINLEVEL_OUTOF10: 2
PAINLEVEL_OUTOF10: 6
PAINLEVEL_OUTOF10: 4
PAINLEVEL_OUTOF10: 6

## 2025-02-28 ASSESSMENT — PAIN DESCRIPTION - LOCATION
LOCATION: ABDOMEN

## 2025-02-28 NOTE — PLAN OF CARE
Patient walked in halls this shift. Patient not passing gas, having increased pain and nausea with distension, KUB ordered and NG placed to LWIS. Patient SOFÍA drain sites continues to leak, dressing changes multiple times. Patient standby with walker. Patient currently NPO.   Problem: Pain  Goal: Verbalizes/displays adequate comfort level or baseline comfort level  Outcome: Progressing  Flowsheets (Taken 2/28/2025 0804)  Verbalizes/displays adequate comfort level or baseline comfort level:   Encourage patient to monitor pain and request assistance   Assess pain using appropriate pain scale   Administer analgesics based on type and severity of pain and evaluate response     Problem: Safety - Adult  Goal: Free from fall injury  Outcome: Progressing     Problem: Chronic Conditions and Co-morbidities  Goal: Patient's chronic conditions and co-morbidity symptoms are monitored and maintained or improved  Outcome: Progressing

## 2025-02-28 NOTE — PROGRESS NOTES
Hospitalist - Progress Note      Patient: Ness Castañeda    Unit/Bed:2018/2018-02  YOB: 1950  MRN: [id number]   Acct: [id number]   PCP: Eri Akbar MD    Date of Service: Pt seen/examined on 02/28/25      Chief Complaint:  medical management    Assessment and Plan:-  Left Colon Sigmoid Mass adhered to the Uterus s/p Left Laprascopic Open Sigmoid Resection with Tap Block  More abd distension  Will get xray  Gen surgery following.   Anemia  Recheck hgb. Transfuse for hgb >7  CKD III  Pre-Diabetes  HTN  On norvasc, lisinopril,   Iron Deficiency  S/p iron infusions x 2     Plan:  Continue home medicines.     Monitor for signs of bleeding.   Gen surgery primary and managing pain , dvt prophy, pt/ot.   ISS, Ac and hs protocol, hypoglycemia protocol.   On cefazolin, flagyl,      Thank you for the consultation. Please call with questions. We will continue to follow along.     Subjective:  Pt seen in room.   Pt feels more tired today  Abd distension and discomfort    History Of Present Illness:      74 y F admitted by surgery for Left colon sigmoid mass adhered to the uterus with significant inflammatory process, s/p Lap Robotic assisted open sigmoid resection.   Patient tolerated the procedure well. No acute concerns.   We were consulted for medical management.   Patient hgb 6.8 this morning. Repeat H/H showed hgb 7.1  Consented for blood. Tolerated procedure well.       Past Medical History:        Diagnosis Date    Arthritis     Bladder tumor 12/2016    Blood in urine 12/2016    Cancer (HCC) 12/2016    BLADDER-REMOVED NO CHEMO OR RADIATION    Cancer (HCC) 12/2016    RT KIDNEY-PARTIAL REMOVAL NO CHEMO OR RADIATION    Dental crowns present     UPPER AND LOWER    Diabetes mellitus (HCC) 2011    ON ORAL MED    Diverticulitis     Hypertension 2015    ON RX STARTING 01/2017    Irregular heart beat     pt evaluated by cardiology 2/19/2025 - cleared for surgery    Kidney disease     stage 3 per pt

## 2025-02-28 NOTE — PLAN OF CARE
Problem: Discharge Planning  Goal: Discharge to home or other facility with appropriate resources  2/28/2025 0325 by Cheryl Sow RN  Outcome: Progressing     Problem: Pain  Goal: Verbalizes/displays adequate comfort level or baseline comfort level  2/28/2025 0325 by Cheryl Sow RN  Outcome: Progressing  Flowsheets (Taken 2/28/2025 0325)  Verbalizes/displays adequate comfort level or baseline comfort level:   Encourage patient to monitor pain and request assistance   Administer analgesics based on type and severity of pain and evaluate response   Consider cultural and social influences on pain and pain management   Assess pain using appropriate pain scale   Implement non-pharmacological measures as appropriate and evaluate response     Problem: Safety - Adult  Goal: Free from fall injury  2/28/2025 0325 by Cheryl Sow RN  Outcome: Progressing  Flowsheets (Taken 2/28/2025 0325)  Free From Fall Injury:   Instruct family/caregiver on patient safety   Based on caregiver fall risk screen, instruct family/caregiver to ask for assistance with transferring infant if caregiver noted to have fall risk factors     Problem: Chronic Conditions and Co-morbidities  Goal: Patient's chronic conditions and co-morbidity symptoms are monitored and maintained or improved  2/28/2025 0325 by Cheryl Sow RN  Outcome: Progressing  Flowsheets (Taken 2/28/2025 0325)  Care Plan - Patient's Chronic Conditions and Co-Morbidity Symptoms are Monitored and Maintained or Improved:   Monitor and assess patient's chronic conditions and comorbid symptoms for stability, deterioration, or improvement   Collaborate with multidisciplinary team to address chronic and comorbid conditions and prevent exacerbation or deterioration   Update acute care plan with appropriate goals if chronic or comorbid symptoms are exacerbated and prevent overall improvement and discharge     Problem: ABCDS Injury  Assessment  Goal: Absence of physical injury  2/28/2025 0325 by Cheryl Sow, RN  Outcome: Progressing     Problem: Skin/Tissue Integrity - Adult  Goal: Incisions, wounds, or drain sites healing without S/S of infection  2/28/2025 0325 by Cheryl Sow, RN  Outcome: Progressing  Flowsheets (Taken 2/27/2025 2029)  Incisions, Wounds, or Drain Sites Healing Without Sign and Symptoms of Infection: TWICE DAILY: Assess and document dressing/incision, wound bed, drain sites and surrounding tissue

## 2025-02-28 NOTE — PROGRESS NOTES
General Surgery Progress Note            PATIENT NAME: Ness Castañeda     TODAY'S DATE: 2/28/2025, 5:35 AM    SUBJECTIVE:    Patient seen and examined at bedside.  No acute events overnight.  VSS, afebrile.  Pain has been controlled.  Patient has been up and ambulating.  No flatus or bowel movement yet.  Tolerated clears without any nausea or vomiting.  Patient with adequate urine output.  SOFÍA intact with 290 cc of output over the past 24 hours, output is serous.  Dressing and packing change at bedside this morning, patient tolerated well.       ROS:    Gen: No fever or chills  Eyes: No conjunctivitis   ENT: No sinus congestion   CV: No chest pain or dyspnea on exertion  Pulm: No cough or shortness of breath  GI: No nausea or vomiting  Renal: No dysuria or hematuria  Neuro: No difficulty with speech or acute extremity weakness  Skin: No rashes or ulcers      OBJECTIVE:   VITALS:  BP (!) 143/64   Pulse 83   Temp 97.7 °F (36.5 °C) (Oral)   Resp 16   Ht 1.651 m (5' 5\")   Wt 73.9 kg (163 lb)   LMP 02/24/1998   SpO2 96%   BMI 27.12 kg/m²      INTAKE/OUTPUT:      Intake/Output Summary (Last 24 hours) at 2/28/2025 0535  Last data filed at 2/28/2025 0508  Gross per 24 hour   Intake 2145.76 ml   Output 2240 ml   Net -94.24 ml       PHYSICAL EXAM  GEN: Alert, awake, oriented, NAD  HEENT: normocephalic, no scleral icterus, moist mucous membranes  CV: Regular rate  LUNG: no respiratory distress, no audible wheezing  ABDOMEN: Soft, distended, appropriately tender to palpation near incisions.  No rebound, guarding, or rigidity.  Incisions are clean and dry with staples intact.  Packing changed to midline this morning.  No active bleeding or drainage noted.  SOFÍA intact with serous output.  EXTREMITIES: No edema, cyanosis or clubbing    Data:  CBC with Differential:    Lab Results   Component Value Date/Time    WBC 22.7 02/28/2025 05:06 AM    RBC 2.71 02/28/2025 05:06 AM    HGB 7.0 02/28/2025 05:06 AM    HCT 23.4 02/28/2025

## 2025-02-28 NOTE — PROGRESS NOTES
Patient up ambulating in halls with family and PCT as stand by assist without difficulty down to elevators and back.

## 2025-03-01 LAB
ANION GAP SERPL CALCULATED.3IONS-SCNC: 8 MMOL/L (ref 9–16)
BASOPHILS # BLD: 0.03 K/UL (ref 0–0.2)
BASOPHILS NFR BLD: 0 % (ref 0–2)
BUN SERPL-MCNC: 12 MG/DL (ref 8–23)
CALCIUM SERPL-MCNC: 9.6 MG/DL (ref 8.8–10.2)
CHLORIDE SERPL-SCNC: 109 MMOL/L (ref 98–107)
CO2 SERPL-SCNC: 21 MMOL/L (ref 20–31)
CREAT SERPL-MCNC: 1.4 MG/DL (ref 0.5–0.9)
EOSINOPHIL # BLD: 0.05 K/UL (ref 0–0.44)
EOSINOPHILS RELATIVE PERCENT: 0 % (ref 1–4)
ERYTHROCYTE [DISTWIDTH] IN BLOOD BY AUTOMATED COUNT: 17.1 % (ref 11.8–14.4)
GFR, ESTIMATED: 40 ML/MIN/1.73M2
GLUCOSE BLD-MCNC: 122 MG/DL (ref 65–105)
GLUCOSE BLD-MCNC: 138 MG/DL (ref 65–105)
GLUCOSE BLD-MCNC: 139 MG/DL (ref 65–105)
GLUCOSE SERPL-MCNC: 143 MG/DL (ref 82–115)
HCT VFR BLD AUTO: 24 % (ref 36.3–47.1)
HGB BLD-MCNC: 7 G/DL (ref 11.9–15.1)
IMM GRANULOCYTES # BLD AUTO: 0.23 K/UL (ref 0–0.3)
IMM GRANULOCYTES NFR BLD: 1 %
LYMPHOCYTES NFR BLD: 0.84 K/UL (ref 1.1–3.7)
LYMPHOCYTES RELATIVE PERCENT: 5 % (ref 24–43)
MCH RBC QN AUTO: 25.5 PG (ref 25.2–33.5)
MCHC RBC AUTO-ENTMCNC: 29.2 G/DL (ref 28.4–34.8)
MCV RBC AUTO: 87.3 FL (ref 82.6–102.9)
MONOCYTES NFR BLD: 0.9 K/UL (ref 0.1–1.2)
MONOCYTES NFR BLD: 6 % (ref 3–12)
NEUTROPHILS NFR BLD: 87 % (ref 36–65)
NEUTS SEG NFR BLD: 14.16 K/UL (ref 1.5–8.1)
NRBC BLD-RTO: 0 PER 100 WBC
PLATELET # BLD AUTO: 374 K/UL (ref 138–453)
PMV BLD AUTO: 8.7 FL (ref 8.1–13.5)
POTASSIUM SERPL-SCNC: 4 MMOL/L (ref 3.7–5.3)
RBC # BLD AUTO: 2.75 M/UL (ref 3.95–5.11)
RBC # BLD: ABNORMAL 10*6/UL
SODIUM SERPL-SCNC: 138 MMOL/L (ref 136–145)
WBC OTHER # BLD: 16.2 K/UL (ref 3.5–11.3)

## 2025-03-01 PROCEDURE — 82947 ASSAY GLUCOSE BLOOD QUANT: CPT

## 2025-03-01 PROCEDURE — 6360000002 HC RX W HCPCS

## 2025-03-01 PROCEDURE — 1200000000 HC SEMI PRIVATE

## 2025-03-01 PROCEDURE — 2580000003 HC RX 258: Performed by: STUDENT IN AN ORGANIZED HEALTH CARE EDUCATION/TRAINING PROGRAM

## 2025-03-01 PROCEDURE — 80048 BASIC METABOLIC PNL TOTAL CA: CPT

## 2025-03-01 PROCEDURE — 6360000002 HC RX W HCPCS: Performed by: SURGERY

## 2025-03-01 PROCEDURE — 2580000003 HC RX 258: Performed by: SURGERY

## 2025-03-01 PROCEDURE — 2580000003 HC RX 258

## 2025-03-01 PROCEDURE — 2500000003 HC RX 250 WO HCPCS: Performed by: SURGERY

## 2025-03-01 PROCEDURE — 36415 COLL VENOUS BLD VENIPUNCTURE: CPT

## 2025-03-01 PROCEDURE — 85025 COMPLETE CBC W/AUTO DIFF WBC: CPT

## 2025-03-01 RX ORDER — CIPROFLOXACIN 2 MG/ML
400 INJECTION, SOLUTION INTRAVENOUS EVERY 12 HOURS
Status: DISCONTINUED | OUTPATIENT
Start: 2025-03-01 | End: 2025-03-04

## 2025-03-01 RX ADMIN — DEXTROSE AND SODIUM CHLORIDE: 5; .45 INJECTION, SOLUTION INTRAVENOUS at 00:03

## 2025-03-01 RX ADMIN — DEXTROSE AND SODIUM CHLORIDE: 5; .45 INJECTION, SOLUTION INTRAVENOUS at 15:39

## 2025-03-01 RX ADMIN — SODIUM CHLORIDE, PRESERVATIVE FREE 10 ML: 5 INJECTION INTRAVENOUS at 09:15

## 2025-03-01 RX ADMIN — Medication 2000 MG: at 01:06

## 2025-03-01 RX ADMIN — METRONIDAZOLE 500 MG: 500 INJECTION, SOLUTION INTRAVENOUS at 17:53

## 2025-03-01 RX ADMIN — METRONIDAZOLE 500 MG: 500 INJECTION, SOLUTION INTRAVENOUS at 09:13

## 2025-03-01 RX ADMIN — CIPROFLOXACIN 400 MG: 400 INJECTION, SOLUTION INTRAVENOUS at 11:02

## 2025-03-01 RX ADMIN — METRONIDAZOLE 500 MG: 500 INJECTION, SOLUTION INTRAVENOUS at 01:13

## 2025-03-01 RX ADMIN — CIPROFLOXACIN 400 MG: 400 INJECTION, SOLUTION INTRAVENOUS at 22:43

## 2025-03-01 RX ADMIN — SODIUM CHLORIDE 125 MG: 9 INJECTION, SOLUTION INTRAVENOUS at 14:14

## 2025-03-01 RX ADMIN — FAMOTIDINE 20 MG: 10 INJECTION, SOLUTION INTRAVENOUS at 09:14

## 2025-03-01 NOTE — PLAN OF CARE
Problem: Discharge Planning  Goal: Discharge to home or other facility with appropriate resources  3/1/2025 0149 by Cheryl Sow RN  Outcome: Progressing     Problem: Pain  Goal: Verbalizes/displays adequate comfort level or baseline comfort level  3/1/2025 0149 by Cheryl Sow RN  Outcome: Progressing  Flowsheets (Taken 3/1/2025 0149)  Verbalizes/displays adequate comfort level or baseline comfort level:   Encourage patient to monitor pain and request assistance   Administer analgesics based on type and severity of pain and evaluate response   Consider cultural and social influences on pain and pain management   Assess pain using appropriate pain scale   Implement non-pharmacological measures as appropriate and evaluate response     Problem: Safety - Adult  Goal: Free from fall injury  3/1/2025 0149 by Cheryl Sow RN  Outcome: Progressing  Flowsheets (Taken 3/1/2025 0149)  Free From Fall Injury:   Instruct family/caregiver on patient safety   Based on caregiver fall risk screen, instruct family/caregiver to ask for assistance with transferring infant if caregiver noted to have fall risk factors     Problem: Chronic Conditions and Co-morbidities  Goal: Patient's chronic conditions and co-morbidity symptoms are monitored and maintained or improved  3/1/2025 0149 by Cheryl Sow RN  Outcome: Progressing  Flowsheets (Taken 3/1/2025 0149)  Care Plan - Patient's Chronic Conditions and Co-Morbidity Symptoms are Monitored and Maintained or Improved:   Monitor and assess patient's chronic conditions and comorbid symptoms for stability, deterioration, or improvement   Collaborate with multidisciplinary team to address chronic and comorbid conditions and prevent exacerbation or deterioration   Update acute care plan with appropriate goals if chronic or comorbid symptoms are exacerbated and prevent overall improvement and discharge     Problem: ABCDS Injury Assessment  Goal:

## 2025-03-01 NOTE — PROGRESS NOTES
Hospitalist - Progress Note      Patient: Ness Castañeda    Unit/Bed:2018/2018-02  YOB: 1950  MRN: [id number]   Acct: [id number]   PCP: Eri Akbar MD    Date of Service: Pt seen/examined on 03/01/25      Chief Complaint:  medical management    Assessment and Plan:-  Left Colon Sigmoid Mass adhered to the Uterus s/p Left Laprascopic Open Sigmoid Resection with Tap Block  More abd distension, xray with concern for Post Op Ileus. Now has NGT and sxms have improved.   Gen surgery following and managing. On IV antibiotics.   Pain is better controlled.     Anemia  Recheck hgb. Transfuse for hgb <7  Pt is sitting at 7. If she gets symptomatic, such as low BP, Fast HR, dizziness, sob, cp, then she should be given 1 u prbcs.     CKD III  Cr is at baseline.   Avoid nephrotoxic meds    Pre-Diabetes  Monitor glucose.   On metformin. Ok to hold while inpt.     HTN  On norvasc, lisinopril,   If bp >150, will increase norvasc to 10mg and or can increase lisinopril to 20mg qd.     Iron Deficiency  S/p iron infusions x 2     Plan:  Continue home medicines.     Monitor for signs of bleeding.   Gen surgery primary and managing pain , dvt prophy, pt/ot.   ISS, Ac and hs protocol, hypoglycemia protocol.   On cefazolin, flagyl,      Thank you for the consultation. Please call with questions. We will continue to follow along.     Subjective:  Pt seen in room.   Pt better today. NGT in place    History Of Present Illness:      74 y F admitted by surgery for Left colon sigmoid mass adhered to the uterus with significant inflammatory process, s/p Lap Robotic assisted open sigmoid resection.   Patient tolerated the procedure well. No acute concerns.   We were consulted for medical management.   Patient hgb 6.8 this morning. Repeat H/H showed hgb 7.1  Consented for blood. Tolerated procedure well.       Past Medical History:        Diagnosis Date    Arthritis     Bladder tumor 12/2016    Blood in urine 12/2016     tenderness, NGT in place  Musculoskeletal:  No clubbing, cyanosis or edema bilaterally.  Skin: Skin color, texture, turgor normal.  No rashes or lesions.  Neurologic:  Neurovascularly intact, grossly non-focal.  Psychiatric: Alert and oriented, thought content appropriate, normal insight  Capillary Refill: Brisk,< 3 seconds   Peripheral Pulses: +2 palpable, equal bilaterally     Labs:   Recent Labs     02/27/25  0536 02/27/25  0843 02/28/25  0506 03/01/25  0546   WBC 20.8*  --  22.7* 16.2*   HGB 6.8* 7.1* 7.0* 7.0*   HCT 23.4* 24.4* 23.4* 24.0*     --  379 374     Recent Labs     02/27/25  0536 02/28/25  0506 03/01/25  0546   * 134* 138   K 4.5 4.1 4.0    105 109*   CO2 19* 19* 21   BUN 21 16 12   CREATININE 1.4* 1.4* 1.4*   CALCIUM 9.3 9.2 9.6     No results for input(s): \"AST\", \"ALT\", \"BILIDIR\", \"BILITOT\", \"ALKPHOS\" in the last 72 hours.  No results for input(s): \"INR\" in the last 72 hours.  No results for input(s): \"CKTOTAL\", \"TROPONINI\" in the last 72 hours.    Urinalysis:    Lab Results   Component Value Date/Time    NITRU NEGATIVE 06/24/2024 11:31 AM    WBCUA 0 TO 2 01/04/2023 09:40 AM    BACTERIA FEW 11/08/2016 11:05 AM    RBCUA 0 TO 2 01/04/2023 09:40 AM    GLUCOSEU NEGATIVE 06/24/2024 11:31 AM       Radiology:   XR ABDOMEN FOR NG/OG/NE TUBE PLACEMENT   Final Result   Enteric tube with the tip and side-port in the stomach.         XR ABDOMEN (KUB) (SINGLE AP VIEW)   Final Result   1. Gas-filled mildly distended large and small bowel loops in the mid   abdomen. No gas in the rectum. Findings most likely represent postoperative   ileus.  Suggest follow-up KUB as per clinical.   2. Rounded soft tissue density right transverse colon, possibly a polyp or   other soft tissue density.           No results found.      EKG:     Electronically signed by Mohsin Mohammad Reza, MD on 3/1/2025 at 3:40 PM

## 2025-03-01 NOTE — PROGRESS NOTES
General Surgery Progress Note            PATIENT NAME: Ness Castañeda     TODAY'S DATE: 3/1/2025, 8:03 AM    SUBJECTIVE:    Patient seen and examined at bedside.  No acute events overnight.  VSS, afebrile.  Pain has been controlled.  Patient has been up and ambulating with assistance. NG tube placed yesterday afternoon due to worsening distension and nausea.  Patient states that she feels improved this morning.  Still complaining of mild distention.  Denies any nausea, vomiting.  200 mL of NG tube output recorded since placement, 50 mL of thin clear fluid in the canister this morning.  Right lower quadrant SOFÍA drain in place with 100 mL of output over the last 24 hours. Hgb stable. WBC downtrending.      ROS:    Gen: No fever or chills  Eyes: No conjunctivitis   ENT: No sinus congestion   CV: No chest pain or dyspnea on exertion  Pulm: No cough or shortness of breath  GI: No nausea or vomiting. Reports mild bloating and incisional pain.   Renal: No dysuria or hematuria  Neuro: No difficulty with speech or acute extremity weakness  Skin: No rashes or ulcers      OBJECTIVE:   VITALS:  BP (!) 145/70   Pulse 85   Temp 97.5 °F (36.4 °C) (Oral)   Resp 16   Ht 1.651 m (5' 5\")   Wt 73.9 kg (163 lb)   LMP 02/24/1998   SpO2 92%   BMI 27.12 kg/m²      INTAKE/OUTPUT:      Intake/Output Summary (Last 24 hours) at 3/1/2025 0803  Last data filed at 3/1/2025 0636  Gross per 24 hour   Intake 2132.24 ml   Output 2850 ml   Net -717.76 ml       PHYSICAL EXAM  GEN: Alert, awake, oriented, NAD  HEENT: normocephalic, no scleral icterus, moist mucous membranes  CV: Regular rate  LUNG: no respiratory distress, no audible wheezing  ABDOMEN: Soft, mildly distended, appropriately tender to palpation near incisions.  No rebound, guarding, or rigidity.  Incisions are clean and dry with staples intact.  Packing changed to midline this morning.  No active bleeding noted.  Small amount of blue/green drainage noted on the packing this morning.  SOFÍA intact with serous output.  EXTREMITIES: No edema, cyanosis or clubbing    Data:  CBC with Differential:    Lab Results   Component Value Date/Time    WBC 16.2 03/01/2025 05:46 AM    RBC 2.75 03/01/2025 05:46 AM    HGB 7.0 03/01/2025 05:46 AM    HCT 24.0 03/01/2025 05:46 AM     03/01/2025 05:46 AM    MCV 87.3 03/01/2025 05:46 AM    MCH 25.5 03/01/2025 05:46 AM    MCHC 29.2 03/01/2025 05:46 AM    RDW 17.1 03/01/2025 05:46 AM    LYMPHOPCT 5 03/01/2025 05:46 AM    MONOPCT 6 03/01/2025 05:46 AM    EOSPCT 0 03/01/2025 05:46 AM    BASOPCT 0 03/01/2025 05:46 AM    MONOSABS 0.90 03/01/2025 05:46 AM    LYMPHSABS 0.84 03/01/2025 05:46 AM    EOSABS 0.05 03/01/2025 05:46 AM    BASOSABS 0.03 03/01/2025 05:46 AM    DIFFTYPE NOT REPORTED 11/08/2016 11:05 AM     BMP:    Lab Results   Component Value Date/Time     03/01/2025 05:46 AM    K 4.0 03/01/2025 05:46 AM     03/01/2025 05:46 AM    CO2 21 03/01/2025 05:46 AM    BUN 12 03/01/2025 05:46 AM    CREATININE 1.4 03/01/2025 05:46 AM    CALCIUM 9.6 03/01/2025 05:46 AM    GFRAA >60 10/19/2018 08:34 AM    LABGLOM 40 03/01/2025 05:46 AM    LABGLOM 39 07/24/2023 10:14 AM    GLUCOSE 143 03/01/2025 05:46 AM       Radiology Review:    No results found.      ASSESSMENT   74 year old female s/p robotic assisted open sigmoid resection 2/26/2025  Pathology: Diverticulosis, abscess cavity with associated dense adhesions and inflammation    Plan  Continue NG tube to low intermittent wall suction, please monitor and record output.   NPO, ok for mouth swabs for comfort.   Monitor for return of bowel function.   Blue/green discoloration on dressing noted this morning, concerning for possible developing Pseudomonas infection.  Will change antibiotic regimen to Cipro/Flagyl.  Monitor for any further discoloration at the midline wound.  Continue daily wound care.  Midline dressing to be changed by surgical team.  Packing changed this morning.   Encourage incentive spirometer

## 2025-03-02 LAB
ANION GAP SERPL CALCULATED.3IONS-SCNC: 9 MMOL/L (ref 9–16)
BASOPHILS # BLD: 0.03 K/UL (ref 0–0.2)
BASOPHILS NFR BLD: 0 % (ref 0–2)
BUN SERPL-MCNC: 9 MG/DL (ref 8–23)
CALCIUM SERPL-MCNC: 9.6 MG/DL (ref 8.8–10.2)
CHLORIDE SERPL-SCNC: 110 MMOL/L (ref 98–107)
CO2 SERPL-SCNC: 20 MMOL/L (ref 20–31)
CREAT SERPL-MCNC: 1.2 MG/DL (ref 0.5–0.9)
EOSINOPHIL # BLD: 0.16 K/UL (ref 0–0.44)
EOSINOPHILS RELATIVE PERCENT: 1 % (ref 1–4)
ERYTHROCYTE [DISTWIDTH] IN BLOOD BY AUTOMATED COUNT: 17.2 % (ref 11.8–14.4)
GFR, ESTIMATED: 46 ML/MIN/1.73M2
GLUCOSE BLD-MCNC: 119 MG/DL (ref 65–105)
GLUCOSE BLD-MCNC: 122 MG/DL (ref 65–105)
GLUCOSE BLD-MCNC: 128 MG/DL (ref 65–105)
GLUCOSE BLD-MCNC: 141 MG/DL (ref 65–105)
GLUCOSE SERPL-MCNC: 140 MG/DL (ref 82–115)
HCT VFR BLD AUTO: 24.4 % (ref 36.3–47.1)
HGB BLD-MCNC: 7.1 G/DL (ref 11.9–15.1)
IMM GRANULOCYTES # BLD AUTO: 0.15 K/UL (ref 0–0.3)
IMM GRANULOCYTES NFR BLD: 1 %
LYMPHOCYTES NFR BLD: 0.77 K/UL (ref 1.1–3.7)
LYMPHOCYTES RELATIVE PERCENT: 5 % (ref 24–43)
MCH RBC QN AUTO: 25.2 PG (ref 25.2–33.5)
MCHC RBC AUTO-ENTMCNC: 29.1 G/DL (ref 28.4–34.8)
MCV RBC AUTO: 86.5 FL (ref 82.6–102.9)
MONOCYTES NFR BLD: 0.92 K/UL (ref 0.1–1.2)
MONOCYTES NFR BLD: 6 % (ref 3–12)
NEUTROPHILS NFR BLD: 87 % (ref 36–65)
NEUTS SEG NFR BLD: 13.13 K/UL (ref 1.5–8.1)
NRBC BLD-RTO: 0.1 PER 100 WBC
PLATELET # BLD AUTO: 406 K/UL (ref 138–453)
PMV BLD AUTO: 8.9 FL (ref 8.1–13.5)
POTASSIUM SERPL-SCNC: 3.6 MMOL/L (ref 3.7–5.3)
RBC # BLD AUTO: 2.82 M/UL (ref 3.95–5.11)
RBC # BLD: ABNORMAL 10*6/UL
SODIUM SERPL-SCNC: 139 MMOL/L (ref 136–145)
WBC OTHER # BLD: 15.2 K/UL (ref 3.5–11.3)

## 2025-03-02 PROCEDURE — 82947 ASSAY GLUCOSE BLOOD QUANT: CPT

## 2025-03-02 PROCEDURE — 6360000002 HC RX W HCPCS

## 2025-03-02 PROCEDURE — 80048 BASIC METABOLIC PNL TOTAL CA: CPT

## 2025-03-02 PROCEDURE — 2580000003 HC RX 258: Performed by: STUDENT IN AN ORGANIZED HEALTH CARE EDUCATION/TRAINING PROGRAM

## 2025-03-02 PROCEDURE — 85025 COMPLETE CBC W/AUTO DIFF WBC: CPT

## 2025-03-02 PROCEDURE — 6370000000 HC RX 637 (ALT 250 FOR IP): Performed by: SURGERY

## 2025-03-02 PROCEDURE — 6360000002 HC RX W HCPCS: Performed by: SURGERY

## 2025-03-02 PROCEDURE — 1200000000 HC SEMI PRIVATE

## 2025-03-02 PROCEDURE — 36415 COLL VENOUS BLD VENIPUNCTURE: CPT

## 2025-03-02 RX ORDER — POTASSIUM CHLORIDE 7.45 MG/ML
10 INJECTION INTRAVENOUS
Status: DISPENSED | OUTPATIENT
Start: 2025-03-02 | End: 2025-03-02

## 2025-03-02 RX ADMIN — METRONIDAZOLE 500 MG: 500 INJECTION, SOLUTION INTRAVENOUS at 00:45

## 2025-03-02 RX ADMIN — LISINOPRIL 10 MG: 10 TABLET ORAL at 08:09

## 2025-03-02 RX ADMIN — LISINOPRIL 10 MG: 10 TABLET ORAL at 20:56

## 2025-03-02 RX ADMIN — POTASSIUM CHLORIDE 10 MEQ: 7.46 INJECTION, SOLUTION INTRAVENOUS at 13:08

## 2025-03-02 RX ADMIN — METRONIDAZOLE 500 MG: 500 INJECTION, SOLUTION INTRAVENOUS at 17:03

## 2025-03-02 RX ADMIN — FAMOTIDINE 20 MG: 20 TABLET, FILM COATED ORAL at 08:09

## 2025-03-02 RX ADMIN — ENOXAPARIN SODIUM 40 MG: 100 INJECTION SUBCUTANEOUS at 16:03

## 2025-03-02 RX ADMIN — POTASSIUM CHLORIDE 10 MEQ: 7.46 INJECTION, SOLUTION INTRAVENOUS at 18:05

## 2025-03-02 RX ADMIN — AMLODIPINE BESYLATE 5 MG: 5 TABLET ORAL at 20:56

## 2025-03-02 RX ADMIN — OXYCODONE HYDROCHLORIDE 5 MG: 5 TABLET ORAL at 22:59

## 2025-03-02 RX ADMIN — METRONIDAZOLE 500 MG: 500 INJECTION, SOLUTION INTRAVENOUS at 10:45

## 2025-03-02 RX ADMIN — POTASSIUM CHLORIDE 10 MEQ: 7.46 INJECTION, SOLUTION INTRAVENOUS at 08:33

## 2025-03-02 RX ADMIN — CIPROFLOXACIN 400 MG: 400 INJECTION, SOLUTION INTRAVENOUS at 11:48

## 2025-03-02 RX ADMIN — CIPROFLOXACIN 400 MG: 400 INJECTION, SOLUTION INTRAVENOUS at 20:49

## 2025-03-02 RX ADMIN — DEXTROSE AND SODIUM CHLORIDE: 5; .45 INJECTION, SOLUTION INTRAVENOUS at 14:15

## 2025-03-02 RX ADMIN — POTASSIUM CHLORIDE 10 MEQ: 7.46 INJECTION, SOLUTION INTRAVENOUS at 15:59

## 2025-03-02 ASSESSMENT — PAIN DESCRIPTION - DESCRIPTORS: DESCRIPTORS: ACHING;DULL

## 2025-03-02 ASSESSMENT — PAIN DESCRIPTION - ORIENTATION: ORIENTATION: MID

## 2025-03-02 ASSESSMENT — PAIN DESCRIPTION - LOCATION: LOCATION: ABDOMEN

## 2025-03-02 ASSESSMENT — PAIN SCALES - GENERAL: PAINLEVEL_OUTOF10: 4

## 2025-03-02 ASSESSMENT — PAIN - FUNCTIONAL ASSESSMENT: PAIN_FUNCTIONAL_ASSESSMENT: ACTIVITIES ARE NOT PREVENTED

## 2025-03-02 NOTE — PROGRESS NOTES
Hospitalist - Progress Note      Patient: Ness Castañeda    Unit/Bed:2018/2018-02  YOB: 1950  MRN: [id number]   Acct: [id number]   PCP: Eri Akbar MD    Date of Service: Pt seen/examined on 03/02/25      Chief Complaint:  medical management    Assessment and Plan:-  Left Colon Sigmoid Mass adhered to the Uterus s/p Left Laprascopic Open Sigmoid Resection with Tap Block  More abd distension, xray with concern for Post Op Ileus. Now has NGT and sxms have improved.   Gen surgery following and managing. On IV antibiotics.   Pain is better controlled.     Anemia  Recheck hgb. Transfuse for hgb <7  Pt is sitting at 7. If she gets symptomatic, such as low BP, Fast HR, dizziness, sob, cp, then I recommend 1 u prbcs.     CKD III  Cr is at baseline.   Avoid nephrotoxic meds    Pre-Diabetes  Monitor glucose.   On metformin. Ok to hold while inpt.     HTN  On norvasc, lisinopril,   If bp >150, will increase norvasc to 10mg and or can increase lisinopril to 20mg qd.     Iron Deficiency  S/p iron infusions x 2     Plan:  Continue home medicines.     Monitor for signs of bleeding.   Gen surgery primary and managing pain , dvt prophy, pt/ot.   ISS, Ac and hs protocol, hypoglycemia protocol.   On cefazolin, flagyl,      Thank you for the consultation. Please call with questions. We will continue to follow along.     Subjective:  Pt seen in room.   Pt better today. NGT in place. Clamped. Pt able to tolerate some ice chips    History Of Present Illness:      74 y F admitted by surgery for Left colon sigmoid mass adhered to the uterus with significant inflammatory process, s/p Lap Robotic assisted open sigmoid resection.   Patient tolerated the procedure well. No acute concerns.   We were consulted for medical management.   Patient hgb 6.8 this morning. Repeat H/H showed hgb 7.1  Consented for blood. Tolerated procedure well.       Past Medical History:        Diagnosis Date    Arthritis     Bladder tumor

## 2025-03-02 NOTE — PROGRESS NOTES
General Surgery Progress Note            PATIENT NAME: Ness Castañeda     TODAY'S DATE: 3/2/2025, 6:46 AM    SUBJECTIVE:    Patient seen and examined at bedside.  No acute events overnight.  VSS, afebrile.  Pain has been controlled.  Patient states that she feels improved from previous.  States that her abdominal pain is improved, and she feels less bloated.  Reports passing small amount of flatus.  No significant bowel function as of yet.  Patient denies any episodes of nausea, vomiting.  Minimal NG tube output over the last 24 hours.  Right lower quadrant SOFÍA drain in place with 290 mL of serosanguineous output over the last 24 hours. Hgb remains stable at 7. WBC slowly downtrending.      ROS:    Gen: No fever or chills  Eyes: No conjunctivitis   ENT: No sinus congestion   CV: No chest pain or dyspnea on exertion  Pulm: No cough or shortness of breath  GI: No nausea or vomiting. Denies abdominal pain and bloating.    Renal: No dysuria or hematuria  Neuro: No difficulty with speech or acute extremity weakness  Skin: No rashes or ulcers      OBJECTIVE:   VITALS:  BP (!) 153/77   Pulse 87   Temp 97.5 °F (36.4 °C) (Oral)   Resp 18   Ht 1.651 m (5' 5\")   Wt 73.9 kg (163 lb)   LMP 02/24/1998   SpO2 92%   BMI 27.12 kg/m²      INTAKE/OUTPUT:      Intake/Output Summary (Last 24 hours) at 3/2/2025 0646  Last data filed at 3/2/2025 0628  Gross per 24 hour   Intake 2746.33 ml   Output 1390 ml   Net 1356.33 ml       PHYSICAL EXAM  GEN: Alert, awake, oriented, NAD  HEENT: normocephalic, no scleral icterus, moist mucous membranes  CV: Regular rate  LUNG: no respiratory distress, no audible wheezing  ABDOMEN: Soft, softly distended, appropriately tender to palpation near incisions.  No rebound, guarding, or rigidity.  Incisions are clean and dry with staples intact.  Packing changed to midline this morning.  No active bleeding noted. SOFÍA intact with serous output.  EXTREMITIES: No edema, cyanosis or clubbing    Data:  CBC  with Differential:    Lab Results   Component Value Date/Time    WBC 15.2 03/02/2025 05:30 AM    RBC 2.82 03/02/2025 05:30 AM    HGB 7.1 03/02/2025 05:30 AM    HCT 24.4 03/02/2025 05:30 AM     03/02/2025 05:30 AM    MCV 86.5 03/02/2025 05:30 AM    MCH 25.2 03/02/2025 05:30 AM    MCHC 29.1 03/02/2025 05:30 AM    RDW 17.2 03/02/2025 05:30 AM    LYMPHOPCT 5 03/02/2025 05:30 AM    MONOPCT 6 03/02/2025 05:30 AM    EOSPCT 1 03/02/2025 05:30 AM    BASOPCT 0 03/02/2025 05:30 AM    MONOSABS 0.92 03/02/2025 05:30 AM    LYMPHSABS 0.77 03/02/2025 05:30 AM    EOSABS 0.16 03/02/2025 05:30 AM    BASOSABS 0.03 03/02/2025 05:30 AM    DIFFTYPE NOT REPORTED 11/08/2016 11:05 AM     BMP:    Lab Results   Component Value Date/Time     03/02/2025 05:30 AM    K 3.6 03/02/2025 05:30 AM     03/02/2025 05:30 AM    CO2 20 03/02/2025 05:30 AM    BUN 9 03/02/2025 05:30 AM    CREATININE 1.2 03/02/2025 05:30 AM    CALCIUM 9.6 03/02/2025 05:30 AM    GFRAA >60 10/19/2018 08:34 AM    LABGLOM 46 03/02/2025 05:30 AM    LABGLOM 39 07/24/2023 10:14 AM    GLUCOSE 140 03/02/2025 05:30 AM       Radiology Review:    No results found.      ASSESSMENT   74 year old female s/p robotic assisted open sigmoid resection 2/26/2025  Pathology: Diverticulosis, abscess cavity with associated dense adhesions and inflammation    Plan  Patient appears to be clinically improved.  Clamp NG tube this morning.  Will consider removal of NG tube after 6 hour clamp trial if tolerated well.  Okay for sips of clear liquids around NG tube.  Monitor for return of bowel function.   Continue IV antibiotics  Continue daily wound care.  Midline dressing to be changed by surgical team.  Packing changed this morning.   Encourage incentive spirometer use  Encourage OOB and ambulation    Jese Tang D.O.  General Surgery Resident, PGY-3  03/02/25

## 2025-03-02 NOTE — PLAN OF CARE
Problem: Discharge Planning  Goal: Discharge to home or other facility with appropriate resources  Outcome: Progressing     Problem: Pain  Goal: Verbalizes/displays adequate comfort level or baseline comfort level  Outcome: Progressing  Flowsheets (Taken 3/2/2025 0302)  Verbalizes/displays adequate comfort level or baseline comfort level:   Encourage patient to monitor pain and request assistance   Administer analgesics based on type and severity of pain and evaluate response   Consider cultural and social influences on pain and pain management   Implement non-pharmacological measures as appropriate and evaluate response   Assess pain using appropriate pain scale     Problem: Safety - Adult  Goal: Free from fall injury  Outcome: Progressing  Flowsheets (Taken 3/2/2025 0302)  Free From Fall Injury:   Instruct family/caregiver on patient safety   Based on caregiver fall risk screen, instruct family/caregiver to ask for assistance with transferring infant if caregiver noted to have fall risk factors     Problem: Chronic Conditions and Co-morbidities  Goal: Patient's chronic conditions and co-morbidity symptoms are monitored and maintained or improved  Outcome: Progressing  Flowsheets (Taken 3/2/2025 0302)  Care Plan - Patient's Chronic Conditions and Co-Morbidity Symptoms are Monitored and Maintained or Improved:   Monitor and assess patient's chronic conditions and comorbid symptoms for stability, deterioration, or improvement   Collaborate with multidisciplinary team to address chronic and comorbid conditions and prevent exacerbation or deterioration   Update acute care plan with appropriate goals if chronic or comorbid symptoms are exacerbated and prevent overall improvement and discharge     Problem: ABCDS Injury Assessment  Goal: Absence of physical injury  Outcome: Progressing     Problem: Skin/Tissue Integrity - Adult  Goal: Incisions, wounds, or drain sites healing without S/S of infection  Outcome:  Progressing  Flowsheets (Taken 3/1/2025 1927)  Incisions, Wounds, or Drain Sites Healing Without Sign and Symptoms of Infection: TWICE DAILY: Assess and document dressing/incision, wound bed, drain sites and surrounding tissue

## 2025-03-02 NOTE — PLAN OF CARE
Problem: Discharge Planning  Goal: Discharge to home or other facility with appropriate resources  Outcome: Progressing     Problem: Pain  Goal: Verbalizes/displays adequate comfort level or baseline comfort level  Outcome: Progressing     Problem: Safety - Adult  Goal: Free from fall injury  Outcome: Progressing     Problem: Chronic Conditions and Co-morbidities  Goal: Patient's chronic conditions and co-morbidity symptoms are monitored and maintained or improved  Outcome: Progressing     Problem: ABCDS Injury Assessment  Goal: Absence of physical injury  Outcome: Progressing     Problem: Skin/Tissue Integrity - Adult  Goal: Incisions, wounds, or drain sites healing without S/S of infection  Outcome: Progressing  Flowsheets  Taken 3/2/2025 1743  Incisions, Wounds, or Drain Sites Healing Without Sign and Symptoms of Infection: TWICE DAILY: Assess and document dressing/incision, wound bed, drain sites and surrounding tissue  Taken 3/2/2025 1742  Incisions, Wounds, or Drain Sites Healing Without Sign and Symptoms of Infection: TWICE DAILY: Assess and document dressing/incision, wound bed, drain sites and surrounding tissue     Problem: Gastrointestinal - Adult  Goal: Minimal or absence of nausea and vomiting  Outcome: Progressing  Goal: Maintains or returns to baseline bowel function  Outcome: Progressing  Goal: Maintains adequate nutritional intake  Outcome: Progressing     Problem: Genitourinary - Adult  Goal: Absence of urinary retention  Outcome: Progressing  Goal: Urinary catheter remains patent  Outcome: Progressing

## 2025-03-03 LAB
ANION GAP SERPL CALCULATED.3IONS-SCNC: 9 MMOL/L (ref 9–16)
BASOPHILS # BLD: <0.03 K/UL (ref 0–0.2)
BASOPHILS NFR BLD: 0 % (ref 0–2)
BUN SERPL-MCNC: 7 MG/DL (ref 8–23)
CALCIUM SERPL-MCNC: 9.4 MG/DL (ref 8.8–10.2)
CHLORIDE SERPL-SCNC: 110 MMOL/L (ref 98–107)
CO2 SERPL-SCNC: 20 MMOL/L (ref 20–31)
CREAT SERPL-MCNC: 1.2 MG/DL (ref 0.5–0.9)
EOSINOPHIL # BLD: 0.38 K/UL (ref 0–0.44)
EOSINOPHILS RELATIVE PERCENT: 3 % (ref 1–4)
ERYTHROCYTE [DISTWIDTH] IN BLOOD BY AUTOMATED COUNT: 17.3 % (ref 11.8–14.4)
GFR, ESTIMATED: 47 ML/MIN/1.73M2
GLUCOSE BLD-MCNC: 111 MG/DL (ref 65–105)
GLUCOSE BLD-MCNC: 131 MG/DL (ref 65–105)
GLUCOSE BLD-MCNC: 144 MG/DL (ref 65–105)
GLUCOSE BLD-MCNC: 153 MG/DL (ref 65–105)
GLUCOSE SERPL-MCNC: 140 MG/DL (ref 82–115)
HCT VFR BLD AUTO: 25.1 % (ref 36.3–47.1)
HGB BLD-MCNC: 7.4 G/DL (ref 11.9–15.1)
IMM GRANULOCYTES # BLD AUTO: 0.15 K/UL (ref 0–0.3)
IMM GRANULOCYTES NFR BLD: 1 %
LYMPHOCYTES NFR BLD: 0.88 K/UL (ref 1.1–3.7)
LYMPHOCYTES RELATIVE PERCENT: 6 % (ref 24–43)
MCH RBC QN AUTO: 25.5 PG (ref 25.2–33.5)
MCHC RBC AUTO-ENTMCNC: 29.5 G/DL (ref 28.4–34.8)
MCV RBC AUTO: 86.6 FL (ref 82.6–102.9)
MONOCYTES NFR BLD: 1.05 K/UL (ref 0.1–1.2)
MONOCYTES NFR BLD: 8 % (ref 3–12)
NEUTROPHILS NFR BLD: 82 % (ref 36–65)
NEUTS SEG NFR BLD: 11.5 K/UL (ref 1.5–8.1)
NRBC BLD-RTO: 0 PER 100 WBC
PLATELET # BLD AUTO: 418 K/UL (ref 138–453)
PMV BLD AUTO: 8.8 FL (ref 8.1–13.5)
POTASSIUM SERPL-SCNC: 4.1 MMOL/L (ref 3.7–5.3)
RBC # BLD AUTO: 2.9 M/UL (ref 3.95–5.11)
RBC # BLD: ABNORMAL 10*6/UL
SODIUM SERPL-SCNC: 138 MMOL/L (ref 136–145)
WBC OTHER # BLD: 14 K/UL (ref 3.5–11.3)

## 2025-03-03 PROCEDURE — 36415 COLL VENOUS BLD VENIPUNCTURE: CPT

## 2025-03-03 PROCEDURE — 97530 THERAPEUTIC ACTIVITIES: CPT

## 2025-03-03 PROCEDURE — 1200000000 HC SEMI PRIVATE

## 2025-03-03 PROCEDURE — 97535 SELF CARE MNGMENT TRAINING: CPT

## 2025-03-03 PROCEDURE — 97116 GAIT TRAINING THERAPY: CPT

## 2025-03-03 PROCEDURE — 2580000003 HC RX 258: Performed by: SURGERY

## 2025-03-03 PROCEDURE — 97166 OT EVAL MOD COMPLEX 45 MIN: CPT

## 2025-03-03 PROCEDURE — 97162 PT EVAL MOD COMPLEX 30 MIN: CPT

## 2025-03-03 PROCEDURE — 80048 BASIC METABOLIC PNL TOTAL CA: CPT

## 2025-03-03 PROCEDURE — 2580000003 HC RX 258: Performed by: STUDENT IN AN ORGANIZED HEALTH CARE EDUCATION/TRAINING PROGRAM

## 2025-03-03 PROCEDURE — 82947 ASSAY GLUCOSE BLOOD QUANT: CPT

## 2025-03-03 PROCEDURE — 6360000002 HC RX W HCPCS

## 2025-03-03 PROCEDURE — 85025 COMPLETE CBC W/AUTO DIFF WBC: CPT

## 2025-03-03 PROCEDURE — 2500000003 HC RX 250 WO HCPCS: Performed by: SURGERY

## 2025-03-03 PROCEDURE — 6370000000 HC RX 637 (ALT 250 FOR IP): Performed by: STUDENT IN AN ORGANIZED HEALTH CARE EDUCATION/TRAINING PROGRAM

## 2025-03-03 PROCEDURE — 6370000000 HC RX 637 (ALT 250 FOR IP): Performed by: SURGERY

## 2025-03-03 PROCEDURE — 6360000002 HC RX W HCPCS: Performed by: SURGERY

## 2025-03-03 RX ORDER — METHOCARBAMOL 500 MG/1
500 TABLET, FILM COATED ORAL 3 TIMES DAILY
Status: DISCONTINUED | OUTPATIENT
Start: 2025-03-03 | End: 2025-03-13 | Stop reason: HOSPADM

## 2025-03-03 RX ORDER — ONDANSETRON 2 MG/ML
4 INJECTION INTRAMUSCULAR; INTRAVENOUS ONCE
Status: COMPLETED | OUTPATIENT
Start: 2025-03-03 | End: 2025-03-03

## 2025-03-03 RX ORDER — ACETAMINOPHEN 500 MG
1000 TABLET ORAL EVERY 8 HOURS SCHEDULED
Status: DISCONTINUED | OUTPATIENT
Start: 2025-03-03 | End: 2025-03-13 | Stop reason: HOSPADM

## 2025-03-03 RX ORDER — OXYCODONE HYDROCHLORIDE 5 MG/1
5 TABLET ORAL EVERY 6 HOURS PRN
Status: DISCONTINUED | OUTPATIENT
Start: 2025-03-03 | End: 2025-03-13 | Stop reason: HOSPADM

## 2025-03-03 RX ADMIN — ONDANSETRON 4 MG: 2 INJECTION, SOLUTION INTRAMUSCULAR; INTRAVENOUS at 21:32

## 2025-03-03 RX ADMIN — SODIUM CHLORIDE, PRESERVATIVE FREE 10 ML: 5 INJECTION INTRAVENOUS at 20:01

## 2025-03-03 RX ADMIN — METHOCARBAMOL 500 MG: 500 TABLET ORAL at 09:23

## 2025-03-03 RX ADMIN — ACETAMINOPHEN 1000 MG: 500 TABLET ORAL at 09:23

## 2025-03-03 RX ADMIN — METRONIDAZOLE 500 MG: 500 INJECTION, SOLUTION INTRAVENOUS at 09:27

## 2025-03-03 RX ADMIN — METRONIDAZOLE 500 MG: 500 INJECTION, SOLUTION INTRAVENOUS at 00:56

## 2025-03-03 RX ADMIN — METHOCARBAMOL 500 MG: 500 TABLET ORAL at 20:01

## 2025-03-03 RX ADMIN — FAMOTIDINE 20 MG: 20 TABLET, FILM COATED ORAL at 09:23

## 2025-03-03 RX ADMIN — LISINOPRIL 10 MG: 10 TABLET ORAL at 20:01

## 2025-03-03 RX ADMIN — SODIUM CHLORIDE, PRESERVATIVE FREE 10 ML: 5 INJECTION INTRAVENOUS at 09:28

## 2025-03-03 RX ADMIN — CIPROFLOXACIN 400 MG: 400 INJECTION, SOLUTION INTRAVENOUS at 10:34

## 2025-03-03 RX ADMIN — ONDANSETRON 4 MG: 2 INJECTION, SOLUTION INTRAMUSCULAR; INTRAVENOUS at 16:32

## 2025-03-03 RX ADMIN — AMLODIPINE BESYLATE 5 MG: 5 TABLET ORAL at 20:01

## 2025-03-03 RX ADMIN — SODIUM CHLORIDE 250 ML: 0.9 INJECTION, SOLUTION INTRAVENOUS at 00:55

## 2025-03-03 RX ADMIN — DEXTROSE AND SODIUM CHLORIDE: 5; .45 INJECTION, SOLUTION INTRAVENOUS at 02:18

## 2025-03-03 RX ADMIN — ACETAMINOPHEN 1000 MG: 500 TABLET ORAL at 15:07

## 2025-03-03 RX ADMIN — METHOCARBAMOL 500 MG: 500 TABLET ORAL at 15:08

## 2025-03-03 RX ADMIN — ENOXAPARIN SODIUM 40 MG: 100 INJECTION SUBCUTANEOUS at 09:24

## 2025-03-03 RX ADMIN — LISINOPRIL 10 MG: 10 TABLET ORAL at 09:23

## 2025-03-03 RX ADMIN — ACETAMINOPHEN 1000 MG: 500 TABLET ORAL at 20:01

## 2025-03-03 RX ADMIN — CIPROFLOXACIN 400 MG: 400 INJECTION, SOLUTION INTRAVENOUS at 20:14

## 2025-03-03 ASSESSMENT — PAIN DESCRIPTION - DESCRIPTORS
DESCRIPTORS: ACHING
DESCRIPTORS: PRESSURE

## 2025-03-03 ASSESSMENT — PAIN DESCRIPTION - LOCATION
LOCATION: ABDOMEN
LOCATION: ABDOMEN

## 2025-03-03 ASSESSMENT — PAIN SCALES - GENERAL
PAINLEVEL_OUTOF10: 0
PAINLEVEL_OUTOF10: 3

## 2025-03-03 ASSESSMENT — PAIN DESCRIPTION - ORIENTATION: ORIENTATION: RIGHT;LEFT

## 2025-03-03 NOTE — PROGRESS NOTES
Occupational Therapy  Facility/Department: [facility] MED SURG  Occupational Therapy Initial Assessment    Name: Ness Castañeda  : 1950  MRN: [id number]  Date of Service: 3/3/2025    RN THIEN reports patient is medically stable for therapy treatment this date.    Chart reviewed prior to treatment and patient is agreeable for therapy.  All lines intact and patient positioned comfortably at end of treatment.  All patient needs addressed prior to ending therapy session.       Due to recent hospitalization and medical condition, pt would benefit from additional intermittent skilled therapy at time of discharge.  Please refer to the AM-PAC score for current functional status.        Discharge Recommendations:  Patient would benefit from continued therapy after discharge  OT Equipment Recommendations  Equipment Needed: Yes  Mobility Devices: ADL Assistive Devices  ADL Assistive Devices: Reacher;Long-handled Shoe Horn;Long-handled Sponge;Emergency Alert System       Patient Diagnosis(es): The encounter diagnosis was Diverticulosis.  Past Medical History:  has a past medical history of Arthritis, Bladder tumor, Blood in urine, Cancer (HCC), Cancer (HCC), Dental crowns present, Diabetes mellitus (HCC), Diverticulitis, Hypertension, Irregular heart beat, Kidney disease, Prediabetes, Skin cancer, and Wears glasses.  Past Surgical History:  has a past surgical history that includes bladder tumor excision (2017); Colonoscopy (); Dilation and curettage of uterus (); Tonsillectomy (); partial nephrectomy (Right, 2017); partial nephrectomy (Right, 2017); Esophagogastroduodenoscopy; skin biopsy; Skin cancer excision; and Sigmoid Colectomy (N/A, 2025).      Pt is s/p:    Pre-Op Diagnosis Codes:      * Diverticulosis [K57.90]     Post-Op Diagnosis:  Left colon sigmoid mass adhered to the uterus with significant inflammatory process       Procedure(s):  LEFT LAPAROSCOPIC ROBOTIC XI ASSITED OPEN SIGMOID  compensatory tech/abd prec with self care.  Pt donned larger pair of B  socks as other ones too tight due to BLE edema following set up/SBA and cues for pacing and pursed lip breathing   Toileting: Minimal assistance  Toileting Skilled Clinical Factors: with gown mgt; pt attempted to urinate and was unable-RN in room and aware   Functional Mobility: Contact guard assistance (recliner to toilet, toilet back to bed, bed to sink, sink to recliner with use of RW)  Functional Mobility Skilled Clinical Factors: MOD to MIN cues for upright posture, pacing, scanning, staying inside AD/keeping close to body and awareness/assist with lines to increase safety/reduce falls                 Cognition  Overall Cognitive Status: Exceptions  Arousal/Alertness: Delayed responses to stimuli  Following Commands: Follows multistep commands with repitition;Follows multistep commands with increased time  Attention Span: Attends with cues to redirect  Memory: Decreased short term memory  Safety Judgement: Decreased awareness of need for safety;Decreased awareness of need for assistance  Problem Solving: Assistance required to identify errors made;Assistance required to correct errors made;Decreased awareness of errors  Insights: Decreased awareness of deficits  Initiation: Requires cues for some  Sequencing: Requires cues for some  Orientation  Overall Orientation Status: Within Functional Limits (pt with flat affect)  Orientation Level: Oriented X4  Perception  Overall Perceptual Status: WFL               Education Given To: Patient;Family  Education Provided: Role of Therapy;Plan of Care;Transfer Training;Family Education;Mobility Training;Fall Prevention Strategies;ADL Adaptive Strategies;Energy Conservation;Precautions  Education Provided Comments: abd prec, safety in function, edema mgt tech, pressure relief, recommendations for continued therapy, proper log rolling tech/abd prec, pursed lip breathing, postural control  Education

## 2025-03-03 NOTE — PLAN OF CARE
Problem: Discharge Planning  Goal: Discharge to home or other facility with appropriate resources  Outcome: Progressing  Flowsheets (Taken 3/3/2025 0807)  Discharge to home or other facility with appropriate resources:   Identify barriers to discharge with patient and caregiver   Arrange for needed discharge resources and transportation as appropriate   Identify discharge learning needs (meds, wound care, etc)   Refer to discharge planning if patient needs post-hospital services based on physician order or complex needs related to functional status, cognitive ability or social support system     Problem: Pain  Goal: Verbalizes/displays adequate comfort level or baseline comfort level  Outcome: Progressing     Problem: Safety - Adult  Goal: Free from fall injury  Outcome: Progressing     Problem: Chronic Conditions and Co-morbidities  Goal: Patient's chronic conditions and co-morbidity symptoms are monitored and maintained or improved  Outcome: Progressing  Flowsheets (Taken 3/3/2025 0807)  Care Plan - Patient's Chronic Conditions and Co-Morbidity Symptoms are Monitored and Maintained or Improved:   Monitor and assess patient's chronic conditions and comorbid symptoms for stability, deterioration, or improvement   Collaborate with multidisciplinary team to address chronic and comorbid conditions and prevent exacerbation or deterioration   Update acute care plan with appropriate goals if chronic or comorbid symptoms are exacerbated and prevent overall improvement and discharge     Problem: ABCDS Injury Assessment  Goal: Absence of physical injury  Outcome: Progressing     Problem: Skin/Tissue Integrity - Adult  Goal: Incisions, wounds, or drain sites healing without S/S of infection  Outcome: Progressing  Flowsheets    Taken 3/3/2025 0807  Incisions, Wounds, or Drain Sites Healing Without Sign and Symptoms of Infection: TWICE DAILY: Assess and document skin integrity     Problem: Gastrointestinal - Adult  Goal:

## 2025-03-03 NOTE — DISCHARGE INSTRUCTIONS
Patient Discharge Instructions  Discharge Date:  3/3/2025    Discharged To:  Home    Home with Home Health Care: Yes    RESUME ACTIVITY:      BATHING: Okay to shower, no baths or submerging in water    DRIVING: No driving on narcotics    RETURN TO WORK: Do not return to work until seen at your next postop appointment    WALKING:  Yes    STAIRS:  Yes    LIFTING: Less than 10 pounds for 6weeks     DIET: Low fiber    SPECIAL INSTRUCTIONS:     May use ice pack for pain/swelling    May use Motrin or Aleve for breakthrough pain    May take Milk of Magnesia as needed for constipation      Call [phone] for follow up appointment with Dr. cheema in:  5-7 days      the tubes downward towards the bulb and continue milking every hour until leakage slows down. Apply Vaseline around the drain insertion site. Attach the drainage bag to your clothes so that there is no tension on the insertion site.      Additional Instruction  Always Secure the drain to your clothing so that there is no tension on the drain at the incision site  Do not cut the drains  Keep tubes connected to the bulbs  Check that the bulb is always deflated (or flat)      Drain Output Record:    Date Time                 Drain #1 (right)   amount                Drain #2  (left)  amount            Drain #3 (back)  amount                                                                                                                                                                                                                                                                                                                                                                         Please note you must strip your drains at least once a day.  Please call the office if change in color of drainage or a sudden drop in the amount of drainage.

## 2025-03-03 NOTE — CARE COORDINATION
Transitional plan     NG pulled yesterday.  SOFÍA drain on Right.  Full liquid diet. Advance diet as tolerated.  Per General surgery note \"anticipate discharge in the next 24-48 hours\".

## 2025-03-03 NOTE — PROGRESS NOTES
Hospitalist - Progress Note      Patient: Ness Castañeda    Unit/Bed:2018/2018-02  YOB: 1950  MRN: [id number]   Acct: [id number]   PCP: Eri Akbar MD    Date of Service: Pt seen/examined on 03/03/25      Chief Complaint:  medical management    Assessment and Plan:-  Left Colon Sigmoid Mass adhered to the Uterus s/p Left Laprascopic Open Sigmoid Resection with Tap Block  More abd distension, xray with concern for Post Op Ileus. Now has NGT and sxms have improved.   Gen surgery following and managing. On IV antibiotics.   Pain is better controlled.     Anemia-likely postoperative on chronic anemia  Recheck hgb. Transfuse for hgb <7  Pt is sitting at 7. If she gets symptomatic, such as low BP, Fast HR, dizziness, sob, cp, then I recommend 1 u prbcs.     CKD III  Cr is at baseline.   Avoid nephrotoxic meds    Pre-Diabetes  Monitor glucose.   On metformin. Ok to hold while inpt.     HTN  On norvasc, lisinopril,   If bp >150, will increase norvasc to 10mg and or can increase lisinopril to 20mg qd.     Iron Deficiency  S/p iron infusions x 2     Plan:  Continue home medicines.     Monitor for signs of bleeding.   Gen surgery primary and managing pain , dvt prophy, pt/ot.   ISS, Ac and hs protocol, hypoglycemia protocol.   On cefazolin, flagyl,      Thank you for the consultation. Please call with questions. We will continue to follow along.     Subjective:  Pt seen in room.   Pt better today. NGT in place. Clamped. Pt able to tolerate some ice chips    History Of Present Illness:      74 y F admitted by surgery for Left colon sigmoid mass adhered to the uterus with significant inflammatory process, s/p Lap Robotic assisted open sigmoid resection.   Patient tolerated the procedure well. No acute concerns.   We were consulted for medical management.   Patient hgb 6.8 this morning. Repeat H/H showed hgb 7.1  Consented for blood. Tolerated procedure well.       Past Medical History:        Diagnosis  Rales/Wheezes/Rhonchi.  Cardiovascular: Regular rate and rhythm   Abdomen: Soft, ND and +ve tenderness, NGT in place  Musculoskeletal:  No clubbing, cyanosis or edema bilaterally.  Skin: Skin color, texture, turgor normal.  No rashes or lesions.  Neurologic:  Neurovascularly intact, grossly non-focal.  Psychiatric: Alert and oriented, thought content appropriate, normal insight  Capillary Refill: Brisk,< 3 seconds   Peripheral Pulses: +2 palpable, equal bilaterally     Labs:   Recent Labs     03/01/25 0546 03/02/25 0530 03/03/25 0534   WBC 16.2* 15.2* 14.0*   HGB 7.0* 7.1* 7.4*   HCT 24.0* 24.4* 25.1*    406 418     Recent Labs     03/01/25 0546 03/02/25 0530 03/03/25 0534    139 138   K 4.0 3.6* 4.1   * 110* 110*   CO2 21 20 20   BUN 12 9 7*   CREATININE 1.4* 1.2* 1.2*   CALCIUM 9.6 9.6 9.4     No results for input(s): \"AST\", \"ALT\", \"BILIDIR\", \"BILITOT\", \"ALKPHOS\" in the last 72 hours.  No results for input(s): \"INR\" in the last 72 hours.  No results for input(s): \"CKTOTAL\", \"TROPONINI\" in the last 72 hours.    Urinalysis:    Lab Results   Component Value Date/Time    NITRU NEGATIVE 06/24/2024 11:31 AM    WBCUA 0 TO 2 01/04/2023 09:40 AM    BACTERIA FEW 11/08/2016 11:05 AM    RBCUA 0 TO 2 01/04/2023 09:40 AM    GLUCOSEU NEGATIVE 06/24/2024 11:31 AM       Radiology:   XR ABDOMEN FOR NG/OG/NE TUBE PLACEMENT   Final Result   Enteric tube with the tip and side-port in the stomach.         XR ABDOMEN (KUB) (SINGLE AP VIEW)   Final Result   1. Gas-filled mildly distended large and small bowel loops in the mid   abdomen. No gas in the rectum. Findings most likely represent postoperative   ileus.  Suggest follow-up KUB as per clinical.   2. Rounded soft tissue density right transverse colon, possibly a polyp or   other soft tissue density.           No results found.      EKG:     Electronically signed by Norma Rios MD on 3/3/2025 at 1:30 PM

## 2025-03-03 NOTE — PLAN OF CARE
Problem: Pain  Goal: Verbalizes/displays adequate comfort level or baseline comfort level  3/3/2025 0042 by Edith Quick RN  Outcome: Progressing  Flowsheets (Taken 3/3/2025 0042)  Verbalizes/displays adequate comfort level or baseline comfort level:   Encourage patient to monitor pain and request assistance   Assess pain using appropriate pain scale   Administer analgesics based on type and severity of pain and evaluate response   Implement non-pharmacological measures as appropriate and evaluate response   Consider cultural and social influences on pain and pain management   Notify Licensed Independent Practitioner if interventions unsuccessful or patient reports new pain

## 2025-03-03 NOTE — PROGRESS NOTES
General Surgery Progress Note            PATIENT NAME: Ness Castañeda     TODAY'S DATE: 3/3/2025, 6:35 AM    SUBJECTIVE:    Patient seen and examined at bedside.  No acute events overnight.  Patient had her NGT removed yesterday and tolerated CLD.  Continues to pass flatus and had a small bowel movement.  Ambulating in the room and to the restroom.  Remains VSS, afebrile.      OBJECTIVE:   VITALS:  BP (!) 142/88   Pulse 89   Temp 97.5 °F (36.4 °C) (Oral)   Resp 16   Ht 1.651 m (5' 5\")   Wt 73.9 kg (163 lb)   LMP 02/24/1998   SpO2 98%   BMI 27.12 kg/m²      INTAKE/OUTPUT:      Intake/Output Summary (Last 24 hours) at 3/3/2025 0635  Last data filed at 3/3/2025 0431  Gross per 24 hour   Intake 3738.23 ml   Output 1885 ml   Net 1853.23 ml       PHYSICAL EXAM  GEN: Alert, awake, oriented, NAD  HEENT: normocephalic, no scleral icterus, moist mucous membranes  CV: Regular rate  LUNG: Unlabored breathing on RA  ABDOMEN: Soft, softly distended, appropriately tender to palpation near incisions.  No rebound, guarding, or rigidity.  Incisions are clean and dry with staples intact.  Packing  to midline.  No active bleeding noted. SOFÍA intact with serous output.  EXTREMITIES: No edema, cyanosis or clubbing    Data:  CBC with Differential:    Lab Results   Component Value Date/Time    WBC 14.0 03/03/2025 05:34 AM    RBC 2.90 03/03/2025 05:34 AM    HGB 7.4 03/03/2025 05:34 AM    HCT 25.1 03/03/2025 05:34 AM     03/03/2025 05:34 AM    MCV 86.6 03/03/2025 05:34 AM    MCH 25.5 03/03/2025 05:34 AM    MCHC 29.5 03/03/2025 05:34 AM    RDW 17.3 03/03/2025 05:34 AM    LYMPHOPCT 6 03/03/2025 05:34 AM    MONOPCT 8 03/03/2025 05:34 AM    EOSPCT 3 03/03/2025 05:34 AM    BASOPCT 0 03/03/2025 05:34 AM    MONOSABS 1.05 03/03/2025 05:34 AM    LYMPHSABS 0.88 03/03/2025 05:34 AM    EOSABS 0.38 03/03/2025 05:34 AM    BASOSABS <0.03 03/03/2025 05:34 AM    DIFFTYPE NOT REPORTED 11/08/2016 11:05 AM     BMP:    Lab Results   Component Value  Date/Time     03/02/2025 05:30 AM    K 3.6 03/02/2025 05:30 AM     03/02/2025 05:30 AM    CO2 20 03/02/2025 05:30 AM    BUN 9 03/02/2025 05:30 AM    CREATININE 1.2 03/02/2025 05:30 AM    CALCIUM 9.6 03/02/2025 05:30 AM    GFRAA >60 10/19/2018 08:34 AM    LABGLOM 46 03/02/2025 05:30 AM    LABGLOM 39 07/24/2023 10:14 AM    GLUCOSE 140 03/02/2025 05:30 AM       Radiology Review:    No results found.      ASSESSMENT   74 year old female s/p robotic assisted open sigmoid resection 2/26/2025  Pathology: Diverticulosis, abscess cavity with associated dense adhesions and inflammation    Plan  Diet: FLD  Continue to monitor bowel function.   Continue IV antibiotics  Continue daily wound care.  Midline dressing to be changed by surgical team.  Okay to remove packing without replacement  Encourage incentive spirometer use  Encourage OOB and ambulation, work with PT/OT  Dispo: Advance diet as tolerated, anticipate discharge in the next 24-48 hours      CHERYL Morris DO - PGY5  Surgery Department    Attending Physician Statement  I have discussed the case, including pertinent history and exam findings with the resident. I agree with the assessment, plan and orders as documented by the resident.   Slow advancement of diet

## 2025-03-03 NOTE — PROGRESS NOTES
Physical Therapy  Facility/Department: [facility] SURG   Physical Therapy Initial Evaluation    Patient Name: Ness Castañeda        MRN: [id number]    : 1950    Date of Service: 3/3/2025    HPI (per chart):   SIGMOID RESECTION LAPAROSCOPIC 25    Past Medical History:  has a past medical history of Arthritis, Bladder tumor, Blood in urine, Cancer (HCC), Cancer (HCC), Dental crowns present, Diabetes mellitus (HCC), Diverticulitis, Hypertension, Irregular heart beat, Kidney disease, Prediabetes, Skin cancer, and Wears glasses.  Past Surgical History:  has a past surgical history that includes bladder tumor excision (2017); Colonoscopy (); Dilation and curettage of uterus (); Tonsillectomy (); partial nephrectomy (Right, 2017); partial nephrectomy (Right, 2017); Esophagogastroduodenoscopy; skin biopsy; Skin cancer excision; and Sigmoid Colectomy (N/A, 2025).    Discharge Recommendations  Discharge Recommendations: Patient would benefit from continued therapy after discharge. Due to recent hospitalization and medical condition, pt would benefit from additional intermittent skilled therapy at time of discharge.  Please refer to the AM-PAC score for current functional status.         Assessment  Body Structures, Functions, Activity Limitations Requiring Skilled Therapeutic Intervention: Decreased functional mobility , Decreased strength, Decreased endurance, Decreased posture  Assessment: Patient able to amb 150 feet with RW and CGA. Patient SBA for transfers. Patient will benefit from skilled PT to improve gait, transfers, balance and strength.  Therapy Prognosis: Good  Decision Making: Medium Complexity  Requires PT Follow-Up: Yes  Activity Tolerance  Activity Tolerance: Patient tolerated treatment well  Safety Devices  Type of Devices: Call light within reach, Gait belt, Heels elevated for pressure relief, Left in chair, Nurse notified    AM-PAC  AM-PAC Basic Mobility -  be indep with transfers.  Short Term Goal 3: Patient will amb 300 feet indep with RW.  Short Term Goal 4: Patient will negotiate 8 stairs with rail and supervision.  Short Term Goal 5: Patient will be indep with HEP.    Minutes  PT Individual Minutes  Time In: 1415  Time Out: 1510  Minutes: 55  Time Code Minutes  Timed Code Treatment Minutes: 40 Minutes    Electronically signed by Steffi Mott, PT on 3/3/25 at 3:15 PM EST

## 2025-03-04 ENCOUNTER — APPOINTMENT (OUTPATIENT)
Dept: GENERAL RADIOLOGY | Age: 75
DRG: 329 | End: 2025-03-04
Attending: SURGERY
Payer: MEDICARE

## 2025-03-04 LAB
ANION GAP SERPL CALCULATED.3IONS-SCNC: 9 MMOL/L (ref 9–16)
BASOPHILS # BLD: 0.04 K/UL (ref 0–0.2)
BASOPHILS NFR BLD: 0 % (ref 0–2)
BUN SERPL-MCNC: 8 MG/DL (ref 8–23)
CALCIUM SERPL-MCNC: 9.9 MG/DL (ref 8.8–10.2)
CHLORIDE SERPL-SCNC: 104 MMOL/L (ref 98–107)
CO2 SERPL-SCNC: 22 MMOL/L (ref 20–31)
CREAT SERPL-MCNC: 1.3 MG/DL (ref 0.5–0.9)
EOSINOPHIL # BLD: 0.27 K/UL (ref 0–0.44)
EOSINOPHILS RELATIVE PERCENT: 1 % (ref 1–4)
ERYTHROCYTE [DISTWIDTH] IN BLOOD BY AUTOMATED COUNT: 18.2 % (ref 11.8–14.4)
GFR, ESTIMATED: 41 ML/MIN/1.73M2
GLUCOSE BLD-MCNC: 100 MG/DL (ref 65–105)
GLUCOSE BLD-MCNC: 106 MG/DL (ref 65–105)
GLUCOSE BLD-MCNC: 107 MG/DL (ref 65–105)
GLUCOSE BLD-MCNC: 96 MG/DL (ref 65–105)
GLUCOSE SERPL-MCNC: 135 MG/DL (ref 82–115)
HCT VFR BLD AUTO: 26.9 % (ref 36.3–47.1)
HGB BLD-MCNC: 8 G/DL (ref 11.9–15.1)
IMM GRANULOCYTES # BLD AUTO: 0.23 K/UL (ref 0–0.3)
IMM GRANULOCYTES NFR BLD: 1 %
LYMPHOCYTES NFR BLD: 0.79 K/UL (ref 1.1–3.7)
LYMPHOCYTES RELATIVE PERCENT: 4 % (ref 24–43)
MCH RBC QN AUTO: 25.8 PG (ref 25.2–33.5)
MCHC RBC AUTO-ENTMCNC: 29.7 G/DL (ref 28.4–34.8)
MCV RBC AUTO: 86.8 FL (ref 82.6–102.9)
MONOCYTES NFR BLD: 1.04 K/UL (ref 0.1–1.2)
MONOCYTES NFR BLD: 5 % (ref 3–12)
NEUTROPHILS NFR BLD: 89 % (ref 36–65)
NEUTS SEG NFR BLD: 18.28 K/UL (ref 1.5–8.1)
NRBC BLD-RTO: 0 PER 100 WBC
PLATELET # BLD AUTO: 515 K/UL (ref 138–453)
PMV BLD AUTO: 8.7 FL (ref 8.1–13.5)
POTASSIUM SERPL-SCNC: 4.1 MMOL/L (ref 3.7–5.3)
RBC # BLD AUTO: 3.1 M/UL (ref 3.95–5.11)
RBC # BLD: ABNORMAL 10*6/UL
SODIUM SERPL-SCNC: 136 MMOL/L (ref 136–145)
WBC OTHER # BLD: 20.7 K/UL (ref 3.5–11.3)

## 2025-03-04 PROCEDURE — 80048 BASIC METABOLIC PNL TOTAL CA: CPT

## 2025-03-04 PROCEDURE — 6370000000 HC RX 637 (ALT 250 FOR IP): Performed by: STUDENT IN AN ORGANIZED HEALTH CARE EDUCATION/TRAINING PROGRAM

## 2025-03-04 PROCEDURE — 1200000000 HC SEMI PRIVATE

## 2025-03-04 PROCEDURE — 6360000002 HC RX W HCPCS

## 2025-03-04 PROCEDURE — 85025 COMPLETE CBC W/AUTO DIFF WBC: CPT

## 2025-03-04 PROCEDURE — 6370000000 HC RX 637 (ALT 250 FOR IP)

## 2025-03-04 PROCEDURE — 71045 X-RAY EXAM CHEST 1 VIEW: CPT

## 2025-03-04 PROCEDURE — 2580000003 HC RX 258

## 2025-03-04 PROCEDURE — 74018 RADEX ABDOMEN 1 VIEW: CPT

## 2025-03-04 PROCEDURE — 2580000003 HC RX 258: Performed by: SURGERY

## 2025-03-04 PROCEDURE — 2500000003 HC RX 250 WO HCPCS: Performed by: SURGERY

## 2025-03-04 PROCEDURE — 6360000002 HC RX W HCPCS: Performed by: NURSE PRACTITIONER

## 2025-03-04 PROCEDURE — 97110 THERAPEUTIC EXERCISES: CPT

## 2025-03-04 PROCEDURE — 6370000000 HC RX 637 (ALT 250 FOR IP): Performed by: SURGERY

## 2025-03-04 PROCEDURE — 97535 SELF CARE MNGMENT TRAINING: CPT

## 2025-03-04 PROCEDURE — 82947 ASSAY GLUCOSE BLOOD QUANT: CPT

## 2025-03-04 PROCEDURE — 0DH673Z INSERTION OF INFUSION DEVICE INTO STOMACH, VIA NATURAL OR ARTIFICIAL OPENING: ICD-10-PCS | Performed by: RADIOLOGY

## 2025-03-04 PROCEDURE — 97116 GAIT TRAINING THERAPY: CPT

## 2025-03-04 PROCEDURE — 36415 COLL VENOUS BLD VENIPUNCTURE: CPT

## 2025-03-04 PROCEDURE — 6360000002 HC RX W HCPCS: Performed by: SURGERY

## 2025-03-04 RX ORDER — ZOLPIDEM TARTRATE 5 MG/1
5 TABLET ORAL NIGHTLY PRN
Status: DISCONTINUED | OUTPATIENT
Start: 2025-03-04 | End: 2025-03-04

## 2025-03-04 RX ORDER — PROCHLORPERAZINE EDISYLATE 5 MG/ML
10 INJECTION INTRAMUSCULAR; INTRAVENOUS EVERY 6 HOURS PRN
Status: DISCONTINUED | OUTPATIENT
Start: 2025-03-04 | End: 2025-03-08

## 2025-03-04 RX ORDER — ZOLPIDEM TARTRATE 5 MG/1
5 TABLET ORAL ONCE
Status: COMPLETED | OUTPATIENT
Start: 2025-03-04 | End: 2025-03-04

## 2025-03-04 RX ORDER — SCOPOLAMINE 1 MG/3D
1 PATCH, EXTENDED RELEASE TRANSDERMAL
Status: DISCONTINUED | OUTPATIENT
Start: 2025-03-04 | End: 2025-03-13 | Stop reason: HOSPADM

## 2025-03-04 RX ORDER — SODIUM CHLORIDE, SODIUM LACTATE, POTASSIUM CHLORIDE, CALCIUM CHLORIDE 600; 310; 30; 20 MG/100ML; MG/100ML; MG/100ML; MG/100ML
INJECTION, SOLUTION INTRAVENOUS CONTINUOUS
Status: DISCONTINUED | OUTPATIENT
Start: 2025-03-04 | End: 2025-03-11

## 2025-03-04 RX ADMIN — ACETAMINOPHEN 1000 MG: 500 TABLET ORAL at 20:33

## 2025-03-04 RX ADMIN — PIPERACILLIN AND TAZOBACTAM 4500 MG: 4; .5 INJECTION, POWDER, LYOPHILIZED, FOR SOLUTION INTRAVENOUS at 08:47

## 2025-03-04 RX ADMIN — ZOLPIDEM TARTRATE 5 MG: 5 TABLET, FILM COATED ORAL at 20:33

## 2025-03-04 RX ADMIN — ENOXAPARIN SODIUM 40 MG: 100 INJECTION SUBCUTANEOUS at 09:58

## 2025-03-04 RX ADMIN — SODIUM CHLORIDE, PRESERVATIVE FREE 10 ML: 5 INJECTION INTRAVENOUS at 20:33

## 2025-03-04 RX ADMIN — PROCHLORPERAZINE EDISYLATE 10 MG: 5 INJECTION INTRAMUSCULAR; INTRAVENOUS at 02:21

## 2025-03-04 RX ADMIN — AMLODIPINE BESYLATE 5 MG: 5 TABLET ORAL at 20:33

## 2025-03-04 RX ADMIN — FAMOTIDINE 20 MG: 10 INJECTION, SOLUTION INTRAVENOUS at 09:59

## 2025-03-04 RX ADMIN — SODIUM CHLORIDE, POTASSIUM CHLORIDE, SODIUM LACTATE AND CALCIUM CHLORIDE: 600; 310; 30; 20 INJECTION, SOLUTION INTRAVENOUS at 06:47

## 2025-03-04 RX ADMIN — LISINOPRIL 10 MG: 10 TABLET ORAL at 20:33

## 2025-03-04 RX ADMIN — SODIUM CHLORIDE, POTASSIUM CHLORIDE, SODIUM LACTATE AND CALCIUM CHLORIDE: 600; 310; 30; 20 INJECTION, SOLUTION INTRAVENOUS at 16:26

## 2025-03-04 RX ADMIN — ONDANSETRON 4 MG: 2 INJECTION, SOLUTION INTRAMUSCULAR; INTRAVENOUS at 05:31

## 2025-03-04 RX ADMIN — OXYCODONE HYDROCHLORIDE 5 MG: 5 TABLET ORAL at 01:19

## 2025-03-04 RX ADMIN — PIPERACILLIN AND TAZOBACTAM 3375 MG: 3; .375 INJECTION, POWDER, LYOPHILIZED, FOR SOLUTION INTRAVENOUS at 20:37

## 2025-03-04 RX ADMIN — PIPERACILLIN AND TAZOBACTAM 3375 MG: 3; .375 INJECTION, POWDER, LYOPHILIZED, FOR SOLUTION INTRAVENOUS at 17:19

## 2025-03-04 RX ADMIN — METHOCARBAMOL 500 MG: 500 TABLET ORAL at 20:33

## 2025-03-04 ASSESSMENT — PAIN DESCRIPTION - ORIENTATION: ORIENTATION: RIGHT;LEFT;MID

## 2025-03-04 ASSESSMENT — PAIN SCALES - GENERAL
PAINLEVEL_OUTOF10: 7
PAINLEVEL_OUTOF10: 0
PAINLEVEL_OUTOF10: 5

## 2025-03-04 ASSESSMENT — PAIN - FUNCTIONAL ASSESSMENT: PAIN_FUNCTIONAL_ASSESSMENT: ACTIVITIES ARE NOT PREVENTED

## 2025-03-04 ASSESSMENT — PAIN DESCRIPTION - DESCRIPTORS: DESCRIPTORS: ACHING;PRESSURE

## 2025-03-04 ASSESSMENT — PAIN DESCRIPTION - LOCATION: LOCATION: ABDOMEN

## 2025-03-04 NOTE — FLOWSHEET NOTE
03/04/25 0000   Closed/Suction Drain RLQ Bulb   Placement Date: 02/26/25   Present on Admission/Arrival: No  Description (optional): 10F FLAT DRAIN  Inserted by: DR WILSON  Tube Number: 1  Location: RLQ  Drain Tube Type: Bulb  Tube Shape: Flat  Drain Reservoir Size (ml): 100 ml   Site Description Leaking at site   Dressing Status New dressing applied;Old drainage noted   Drainage Appearance Serous   Drain Status Compressed;To bulb suction   Output (ml) 60 ml

## 2025-03-04 NOTE — CARE COORDINATION
DC Planning    Spoke with pt at bedside, NG back in to [facility].  NPO. Zosyn. Pt is from home  w spouse/dtr. Plan is for home w [facility] at [place]-pt agrees [facility] . PS to [place].

## 2025-03-04 NOTE — PROGRESS NOTES
Occupational Therapy  Facility/Department: [facility]  Daily Treatment Note  NAME: Ness Castañeda  : 1950  MRN: [id number]    URIAH KIM reports patient is medically stable for therapy treatment this date.    Chart reviewed prior to treatment and patient is agreeable for therapy.  All lines intact and patient positioned comfortably at end of treatment.  All patient needs addressed prior to ending therapy session.       Due to recent hospitalization and medical condition, pt would benefit from additional intermittent skilled therapy at time of discharge.  Please refer to the AM-PAC score for current functional status.        Date of Service: 3/4/2025    Discharge Recommendations:  Patient would benefit from continued therapy after discharge  OT Equipment Recommendations  ADL Assistive Devices: Reacher;Long-handled Shoe Horn;Long-handled Sponge;Emergency Alert System      Patient Diagnosis(es): The encounter diagnosis was Diverticulosis.     Assessment   Assessment: Pt now with NG tube in this date/NPO and ice chips however with no c/o pain.  Pt with flat affect, appears fatigued as pt stating she is not sleeping well at hospital and with minimal verbalizations this session.  OT focus was on completing self care tasks to improve overall I/safety.  Pt completed mult sit to stands from recliner with SBA and MIN cues needed for safety.  UB bathe/dress with set up/MIN assist and LB bathe/dress with set up/SBA.  Continue with OT POC to return pt to PLOF/I level and DC home safely with family assist as needed.    Activity Tolerance: Patient tolerated treatment well;Patient limited by fatigue;Patient limited by endurance  Discharge Recommendations: Patient would benefit from continued therapy after discharge     Plan  Occupational Therapy Plan  Times Per Week: 5x/week 1x/day as raj  Current Treatment Recommendations: Strengthening;Balance training;Functional mobility training;Endurance training;Safety education &  training;Pain management;Neuromuscular re-education;Equipment evaluation, education, & procurement;Self-Care / ADL;Patient/Caregiver education & training;Home management training;Cognitive/Perceptual training;Coordination training;Positioning    Restrictions  Restrictions/Precautions  Restrictions/Precautions: Fall Risk;General Precautions  Activity Level: Up with Assist  Position Activity Restriction  Other Position/Activity Restrictions: RUE IV, abd binder as needed when up, alarms, SOFÍA drain, NPO with ice chips/NGT, up with assist    Subjective  Subjective  Subjective: Pt states she is not sleeping well here!  Pain: Pt denies pain  Orientation  Overall Orientation Status: Within Normal Limits  Orientation Level: Oriented X4  Cognition  Overall Cognitive Status: WFL  Arousal/Alertness: Delayed responses to stimuli  Following Commands: Follows multistep commands with repitition;Follows multistep commands with increased time  Attention Span: Appears intact  Memory: Decreased short term memory  Safety Judgement: Decreased awareness of need for safety;Decreased awareness of need for assistance  Problem Solving: Assistance required to identify errors made;Assistance required to correct errors made;Decreased awareness of errors  Insights: Decreased awareness of deficits  Initiation: Requires cues for some  Sequencing: Requires cues for some       Objective     Bed Mobility Training  Bed Mobility Training: No (pt up in recliner and requested to continue to sit up in)  Overall Level of Assistance:  (with increased time)      Balance  Sitting:  (SBA to SUP)  Standing:  SBA with RW)  Transfer Training  Transfer Training: Yes  Overall Level of Assistance: Stand by assistance with RW  Interventions: Verbal cues;Safety awareness training;Demonstration (MIN cues for B hand placement reaching back to surface, pacing, controlled descend and awareness/assist with lines to increase overall safety)  Sit to Stand: Stand by assistance

## 2025-03-04 NOTE — PLAN OF CARE
Problem: Discharge Planning  Goal: Discharge to home or other facility with appropriate resources  3/4/2025 0437 by Kathryn Lora RN  Outcome: Progressing  Flowsheets (Taken 3/3/2025 2015)  Discharge to home or other facility with appropriate resources:   Identify barriers to discharge with patient and caregiver   Arrange for needed discharge resources and transportation as appropriate   Identify discharge learning needs (meds, wound care, etc)   Refer to discharge planning if patient needs post-hospital services based on physician order or complex needs related to functional status, cognitive ability or social support system     Problem: Pain  Goal: Verbalizes/displays adequate comfort level or baseline comfort level  3/4/2025 0437 by Kathryn Lora RN  Outcome: Progressing     Problem: Safety - Adult  Goal: Free from fall injury  3/4/2025 0437 by Kathryn Lora RN  Outcome: Progressing     Problem: Chronic Conditions and Co-morbidities  Goal: Patient's chronic conditions and co-morbidity symptoms are monitored and maintained or improved  3/4/2025 0437 by Kathryn Lora RN  Outcome: Progressing  Flowsheets (Taken 3/3/2025 2015)  Care Plan - Patient's Chronic Conditions and Co-Morbidity Symptoms are Monitored and Maintained or Improved:   Collaborate with multidisciplinary team to address chronic and comorbid conditions and prevent exacerbation or deterioration   Monitor and assess patient's chronic conditions and comorbid symptoms for stability, deterioration, or improvement   Update acute care plan with appropriate goals if chronic or comorbid symptoms are exacerbated and prevent overall improvement and discharge     Problem: ABCDS Injury Assessment  Goal: Absence of physical injury  3/4/2025 0437 by Kathryn Lora RN  Outcome: Progressing     Problem: Skin/Tissue Integrity - Adult  Goal: Incisions, wounds, or drain sites healing without S/S of infection  3/4/2025 0437 by Kathryn Lora  RN  Outcome: Progressing

## 2025-03-04 NOTE — PROGRESS NOTES
Hospitalist - Progress Note      Patient: Ness Castañeda    Unit/Bed:2018/2018-02  YOB: 1950  MRN: [id number]   Acct: [id number]   PCP: Eri Akbar MD    Date of Service: Pt seen/examined on 03/04/25      Chief Complaint:  medical management    Assessment and Plan:-  Left Colon Sigmoid Mass adhered to the Uterus s/p Left Laprascopic Open Sigmoid Resection with Tap Block  More abd distension, xray with concern for Post Op Ileus. Now has NGT and sxms have improved.   Gen surgery following and managing. On IV antibiotics.   NGT is replaced    Anemia-likely postoperative on chronic anemia  Recheck hgb. Transfuse for hgb <7  Pt is sitting at 7. If she gets symptomatic, such as low BP, Fast HR, dizziness, sob, cp, then I recommend 1 u prbcs.     CKD III  Cr is at baseline.   Avoid nephrotoxic meds    Pre-Diabetes  Monitor glucose.   On metformin. Ok to hold while inpt.     HTN  On norvasc, lisinopril,   If bp >150, will increase norvasc to 10mg and or can increase lisinopril to 20mg qd.     Iron Deficiency  S/p iron infusions x 2     Plan:  Continue home medicines.     Monitor for signs of bleeding.   Gen surgery primary and managing pain , dvt prophy, pt/ot.   ISS, Ac and hs protocol, hypoglycemia protocol.   On cefazolin, flagyl,      Thank you for the consultation. Please call with questions. We will continue to follow along.     Subjective:  Pt seen in room.   Pt better today. NGT in place. Clamped. Pt able to tolerate some ice chips    History Of Present Illness:      74 y F admitted by surgery for Left colon sigmoid mass adhered to the uterus with significant inflammatory process, s/p Lap Robotic assisted open sigmoid resection.   Patient tolerated the procedure well. No acute concerns.   We were consulted for medical management.   Patient hgb 6.8 this morning. Repeat H/H showed hgb 7.1  Consented for blood. Tolerated procedure well.       Past Medical History:        Diagnosis Date

## 2025-03-04 NOTE — PROGRESS NOTES
Occupational Therapy  [facility]  Occupational Therapy Not Seen Note    Patient not available for Occupational Therapy due to:    [] Testing:    [] Hemodialysis    [] Cancelled by RN:    [x]Refusal by Patient:pt declined tx and requested to continue to visit with her mult family members and check back later as able     [] Surgery:     [] Intubation:     [] Pain Medication:    [] Sedation:     [] Spine Precautions :    [] Medical Instability:    [] Other:        none

## 2025-03-04 NOTE — PLAN OF CARE
Problem: Discharge Planning  Goal: Discharge to home or other facility with appropriate resources  3/4/2025 1700 by Miguelina Kilgore RN  Outcome: Progressing       Problem: Pain  Goal: Verbalizes/displays adequate comfort level or baseline comfort level  3/4/2025 1700 by Miguelina Kilgore RN  Outcome: Progressing       Problem: Safety - Adult  Goal: Free from fall injury  3/4/2025 1700 by Miguelina Kilgore RN  Outcome: Progressing    Problem: Chronic Conditions and Co-morbidities  Goal: Patient's chronic conditions and co-morbidity symptoms are monitored and maintained or improved  3/4/2025 1700 by Miguelina Kilgore RN  Outcome: Progressing       Problem: ABCDS Injury Assessment  Goal: Absence of physical injury  3/4/2025 1700 by Miguelina Kilgore RN  Outcome: Progressing       Problem: Skin/Tissue Integrity - Adult  Goal: Incisions, wounds, or drain sites healing without S/S of infection  3/4/2025 1700 by Miguelina Kilgore RN  Outcome: Progressing  Flowsheets (Taken 3/4/2025 1118)  Incisions, Wounds, or Drain Sites Healing Without Sign and Symptoms of Infection: TWICE DAILY: Assess and document dressing/incision, wound bed, drain sites and surrounding tissue       Problem: Gastrointestinal - Adult  Goal: Minimal or absence of nausea and vomiting  3/4/2025 1700 by Miguelina Kilgore RN  Outcome: Progressing    Goal: Maintains or returns to baseline bowel function  3/4/2025 1700 by Miguelina Kilgore RN  Outcome: Progressing  3  Goal: Maintains adequate nutritional intake  3/4/2025 1700 by Miguelina Kilgore RN  Outcome: Progressing       Problem: Genitourinary - Adult  Goal: Absence of urinary retention  3/4/2025 1700 by Miguelina Kilgore RN  Outcome: Progressing    Goal: Urinary catheter remains patent  3/4/2025 1700 by Miguelina Kilgore RN  Outcome: Progressing

## 2025-03-04 NOTE — PROGRESS NOTES
Physical Therapy  Facility/Department: [facility]  Rehabilitation Physical Therapy Treatment Note    NAME: Ness Castañeda  : 1950 (74 y.o.)  MRN: [id number]  CODE STATUS: Full Code    Date of Service: 3/4/25       Restrictions:  Restrictions/Precautions: Fall Risk, General Precautions  Position Activity Restriction  Other Position/Activity Restrictions: RUE IV, abd binder as needed when up, alarms, SOFÍA drain, full liquid diet, up with assist     SUBJECTIVE  Subjective: pt in chair at initiation of treatment session agreeable to PT       OBJECTIVE  Cognition  Overall Cognitive Status: WFL  Arousal/Alertness: Delayed responses to stimuli  Following Commands: Follows multistep commands with repitition;Follows multistep commands with increased time  Attention Span: Appears intact  Memory: Decreased short term memory  Safety Judgement: Decreased awareness of need for safety;Decreased awareness of need for assistance  Problem Solving: Assistance required to identify errors made;Assistance required to correct errors made;Decreased awareness of errors  Insights: Decreased awareness of deficits  Initiation: Requires cues for some  Sequencing: Requires cues for some  Orientation  Overall Orientation Status: Within Normal Limits  Orientation Level: Oriented X4    Functional Mobility  Transfers  Surface: From chair with arms;To chair with arms  Additional Factors: Verbal cues  Device: Walker  Sit to Stand  Assistance Level: Contact guard assist;Stand by assist  Stand to Sit  Assistance Level: Contact guard assist;Stand by assist  Bed To/From Chair  Technique: Stand step;Stand pivot  Assistance Level: Stand by assist;Contact guard assist  Stand Pivot  Assistance Level: Stand by assist      Environmental Mobility  Ambulation  Surface: Level surface  Device: Rolling walker  Distance: 130 ft  Activity: Within Room;Within Unit  Additional Factors: Verbal cues;Hand placement cues  Assistance Level: Contact guard assist  Gait  46,58  Mobility Inpatient CMS G-Code Modifier:CK        Therapy Time   Individual Concurrent Group Co-treatment   Time In 1314         Time Out 1343         Minutes 29                   ELENI AUGUSTINE PTA, 03/04/25 at 2:55 PM

## 2025-03-04 NOTE — PROGRESS NOTES
General Surgery Progress Note            PATIENT NAME: Ness Castañeda     TODAY'S DATE: 3/4/2025, 6:33 AM    SUBJECTIVE:    Patient seen and examined at bedside.  Yesterday, patient did not tolerate diet advancement and had some nausea and emesis.  She was made n.p.o. again.  This morning, the patient had some dry heaves at bedside during examination.  She had a bump in her leukocytosis to 20.7 from 14.0.  She reports mild continued abdominal discomfort.    OBJECTIVE:   VITALS:  BP (!) 144/71   Pulse 81   Temp 97.5 °F (36.4 °C) (Oral)   Resp 16   Ht 1.651 m (5' 5\")   Wt 73.9 kg (163 lb)   LMP 02/24/1998   SpO2 95%   BMI 27.12 kg/m²      INTAKE/OUTPUT:      Intake/Output Summary (Last 24 hours) at 3/4/2025 0633  Last data filed at 3/4/2025 0000  Gross per 24 hour   Intake --   Output 1965 ml   Net -1965 ml       PHYSICAL EXAM  GEN: Alert, awake, oriented, NAD  HEENT: normocephalic, no scleral icterus, moist mucous membranes  CV: Regular rate  LUNG: Unlabored breathing on RA  ABDOMEN: Soft, softly distended, appropriately tender to palpation near incisions.  No rebound, guarding, or rigidity.  Incisions are clean and dry with staples intact.   No active bleeding noted. SOFÍA intact with ss output.  EXTREMITIES: No edema, cyanosis or clubbing    Data:  CBC with Differential:    Lab Results   Component Value Date/Time    WBC 20.7 03/04/2025 05:45 AM    RBC 3.10 03/04/2025 05:45 AM    HGB 8.0 03/04/2025 05:45 AM    HCT 26.9 03/04/2025 05:45 AM     03/04/2025 05:45 AM    MCV 86.8 03/04/2025 05:45 AM    MCH 25.8 03/04/2025 05:45 AM    MCHC 29.7 03/04/2025 05:45 AM    RDW 18.2 03/04/2025 05:45 AM    LYMPHOPCT 4 03/04/2025 05:45 AM    MONOPCT 5 03/04/2025 05:45 AM    EOSPCT 1 03/04/2025 05:45 AM    BASOPCT 0 03/04/2025 05:45 AM    MONOSABS 1.04 03/04/2025 05:45 AM    LYMPHSABS 0.79 03/04/2025 05:45 AM    EOSABS 0.27 03/04/2025 05:45 AM    BASOSABS 0.04 03/04/2025 05:45 AM    DIFFTYPE NOT REPORTED 11/08/2016 11:05  Elevated WBC.   C/o n/v  Check abdominal films.  Empirically start on antibiotics

## 2025-03-05 LAB
ANION GAP SERPL CALCULATED.3IONS-SCNC: 9 MMOL/L (ref 9–16)
BASOPHILS # BLD: 0.04 K/UL (ref 0–0.2)
BASOPHILS NFR BLD: 0 % (ref 0–2)
BILIRUB UR QL STRIP: NEGATIVE
BUN SERPL-MCNC: 10 MG/DL (ref 8–23)
CA-I BLD-SCNC: 1.42 MMOL/L (ref 1.15–1.33)
CALCIUM SERPL-MCNC: 9.4 MG/DL (ref 8.8–10.2)
CHLORIDE SERPL-SCNC: 108 MMOL/L (ref 98–107)
CLARITY UR: CLEAR
CO2 SERPL-SCNC: 24 MMOL/L (ref 20–31)
COLOR UR: YELLOW
CREAT SERPL-MCNC: 1.4 MG/DL (ref 0.5–0.9)
EOSINOPHIL # BLD: 0.38 K/UL (ref 0–0.44)
EOSINOPHILS RELATIVE PERCENT: 2 % (ref 1–4)
EPI CELLS #/AREA URNS HPF: ABNORMAL /HPF (ref 0–5)
ERYTHROCYTE [DISTWIDTH] IN BLOOD BY AUTOMATED COUNT: 18.5 % (ref 11.8–14.4)
GFR, ESTIMATED: 38 ML/MIN/1.73M2
GLUCOSE BLD-MCNC: 112 MG/DL (ref 65–105)
GLUCOSE BLD-MCNC: 143 MG/DL (ref 65–105)
GLUCOSE BLD-MCNC: 84 MG/DL (ref 65–105)
GLUCOSE BLD-MCNC: 85 MG/DL (ref 65–105)
GLUCOSE SERPL-MCNC: 84 MG/DL (ref 82–115)
GLUCOSE UR STRIP-MCNC: NEGATIVE MG/DL
HCT VFR BLD AUTO: 24.9 % (ref 36.3–47.1)
HGB BLD-MCNC: 7.3 G/DL (ref 11.9–15.1)
HGB UR QL STRIP.AUTO: NEGATIVE
IMM GRANULOCYTES # BLD AUTO: 0.2 K/UL (ref 0–0.3)
IMM GRANULOCYTES NFR BLD: 1 %
KETONES UR STRIP-MCNC: NEGATIVE MG/DL
LEUKOCYTE ESTERASE UR QL STRIP: NEGATIVE
LYMPHOCYTES NFR BLD: 1.17 K/UL (ref 1.1–3.7)
LYMPHOCYTES RELATIVE PERCENT: 7 % (ref 24–43)
MAGNESIUM SERPL-MCNC: 1.9 MG/DL (ref 1.6–2.4)
MCH RBC QN AUTO: 25.7 PG (ref 25.2–33.5)
MCHC RBC AUTO-ENTMCNC: 29.3 G/DL (ref 28.4–34.8)
MCV RBC AUTO: 87.7 FL (ref 82.6–102.9)
MONOCYTES NFR BLD: 0.93 K/UL (ref 0.1–1.2)
MONOCYTES NFR BLD: 6 % (ref 3–12)
NEUTROPHILS NFR BLD: 83 % (ref 36–65)
NEUTS SEG NFR BLD: 13.23 K/UL (ref 1.5–8.1)
NITRITE UR QL STRIP: NEGATIVE
NRBC BLD-RTO: 0 PER 100 WBC
PH UR STRIP: 6 [PH] (ref 5–8)
PHOSPHATE SERPL-MCNC: 3.6 MG/DL (ref 2.5–4.5)
PLATELET # BLD AUTO: 448 K/UL (ref 138–453)
PMV BLD AUTO: 9 FL (ref 8.1–13.5)
POTASSIUM SERPL-SCNC: 4 MMOL/L (ref 3.7–5.3)
PROT UR STRIP-MCNC: ABNORMAL MG/DL
RBC # BLD AUTO: 2.84 M/UL (ref 3.95–5.11)
RBC # BLD: ABNORMAL 10*6/UL
RBC #/AREA URNS HPF: ABNORMAL /HPF (ref 0–2)
SODIUM SERPL-SCNC: 140 MMOL/L (ref 136–145)
SP GR UR STRIP: 1.02 (ref 1–1.03)
UROBILINOGEN UR STRIP-ACNC: NORMAL EU/DL (ref 0–1)
WBC #/AREA URNS HPF: ABNORMAL /HPF (ref 0–5)
WBC OTHER # BLD: 16 K/UL (ref 3.5–11.3)

## 2025-03-05 PROCEDURE — 76937 US GUIDE VASCULAR ACCESS: CPT

## 2025-03-05 PROCEDURE — 2500000003 HC RX 250 WO HCPCS: Performed by: STUDENT IN AN ORGANIZED HEALTH CARE EDUCATION/TRAINING PROGRAM

## 2025-03-05 PROCEDURE — 3E0T3BZ INTRODUCTION OF ANESTHETIC AGENT INTO PERIPHERAL NERVES AND PLEXI, PERCUTANEOUS APPROACH: ICD-10-PCS | Performed by: SURGERY

## 2025-03-05 PROCEDURE — 2580000003 HC RX 258: Performed by: SURGERY

## 2025-03-05 PROCEDURE — 6360000002 HC RX W HCPCS: Performed by: STUDENT IN AN ORGANIZED HEALTH CARE EDUCATION/TRAINING PROGRAM

## 2025-03-05 PROCEDURE — 97110 THERAPEUTIC EXERCISES: CPT

## 2025-03-05 PROCEDURE — 83735 ASSAY OF MAGNESIUM: CPT

## 2025-03-05 PROCEDURE — 36415 COLL VENOUS BLD VENIPUNCTURE: CPT

## 2025-03-05 PROCEDURE — 6360000002 HC RX W HCPCS

## 2025-03-05 PROCEDURE — 84100 ASSAY OF PHOSPHORUS: CPT

## 2025-03-05 PROCEDURE — C1751 CATH, INF, PER/CENT/MIDLINE: HCPCS

## 2025-03-05 PROCEDURE — 2500000003 HC RX 250 WO HCPCS: Performed by: SURGERY

## 2025-03-05 PROCEDURE — 02H633Z INSERTION OF INFUSION DEVICE INTO RIGHT ATRIUM, PERCUTANEOUS APPROACH: ICD-10-PCS | Performed by: SURGERY

## 2025-03-05 PROCEDURE — 2580000003 HC RX 258: Performed by: STUDENT IN AN ORGANIZED HEALTH CARE EDUCATION/TRAINING PROGRAM

## 2025-03-05 PROCEDURE — 3E0336Z INTRODUCTION OF NUTRITIONAL SUBSTANCE INTO PERIPHERAL VEIN, PERCUTANEOUS APPROACH: ICD-10-PCS | Performed by: SURGERY

## 2025-03-05 PROCEDURE — 36569 INSJ PICC 5 YR+ W/O IMAGING: CPT

## 2025-03-05 PROCEDURE — 2580000003 HC RX 258

## 2025-03-05 PROCEDURE — 80048 BASIC METABOLIC PNL TOTAL CA: CPT

## 2025-03-05 PROCEDURE — 1200000000 HC SEMI PRIVATE

## 2025-03-05 PROCEDURE — 6370000000 HC RX 637 (ALT 250 FOR IP): Performed by: SURGERY

## 2025-03-05 PROCEDURE — 85025 COMPLETE CBC W/AUTO DIFF WBC: CPT

## 2025-03-05 PROCEDURE — 97116 GAIT TRAINING THERAPY: CPT

## 2025-03-05 PROCEDURE — 81001 URINALYSIS AUTO W/SCOPE: CPT

## 2025-03-05 PROCEDURE — 6370000000 HC RX 637 (ALT 250 FOR IP): Performed by: STUDENT IN AN ORGANIZED HEALTH CARE EDUCATION/TRAINING PROGRAM

## 2025-03-05 PROCEDURE — 82330 ASSAY OF CALCIUM: CPT

## 2025-03-05 RX ORDER — SODIUM CHLORIDE 9 MG/ML
INJECTION, SOLUTION INTRAVENOUS PRN
Status: DISCONTINUED | OUTPATIENT
Start: 2025-03-05 | End: 2025-03-13 | Stop reason: HOSPADM

## 2025-03-05 RX ORDER — SODIUM CHLORIDE 0.9 % (FLUSH) 0.9 %
5-40 SYRINGE (ML) INJECTION EVERY 12 HOURS SCHEDULED
Status: DISCONTINUED | OUTPATIENT
Start: 2025-03-05 | End: 2025-03-13 | Stop reason: HOSPADM

## 2025-03-05 RX ORDER — SODIUM CHLORIDE 0.9 % (FLUSH) 0.9 %
5-40 SYRINGE (ML) INJECTION PRN
Status: DISCONTINUED | OUTPATIENT
Start: 2025-03-05 | End: 2025-03-13 | Stop reason: HOSPADM

## 2025-03-05 RX ORDER — INSULIN LISPRO 100 [IU]/ML
0-4 INJECTION, SOLUTION INTRAVENOUS; SUBCUTANEOUS EVERY 6 HOURS
Status: DISCONTINUED | OUTPATIENT
Start: 2025-03-05 | End: 2025-03-13 | Stop reason: HOSPADM

## 2025-03-05 RX ORDER — LIDOCAINE HYDROCHLORIDE 10 MG/ML
50 INJECTION, SOLUTION EPIDURAL; INFILTRATION; INTRACAUDAL; PERINEURAL ONCE
Status: DISCONTINUED | OUTPATIENT
Start: 2025-03-05 | End: 2025-03-13 | Stop reason: HOSPADM

## 2025-03-05 RX ADMIN — CALCIUM GLUCONATE: 98 INJECTION INTRAVENOUS at 18:58

## 2025-03-05 RX ADMIN — PIPERACILLIN AND TAZOBACTAM 3375 MG: 3; .375 INJECTION, POWDER, LYOPHILIZED, FOR SOLUTION INTRAVENOUS at 14:30

## 2025-03-05 RX ADMIN — PIPERACILLIN AND TAZOBACTAM 3375 MG: 3; .375 INJECTION, POWDER, LYOPHILIZED, FOR SOLUTION INTRAVENOUS at 05:43

## 2025-03-05 RX ADMIN — SODIUM CHLORIDE, POTASSIUM CHLORIDE, SODIUM LACTATE AND CALCIUM CHLORIDE: 600; 310; 30; 20 INJECTION, SOLUTION INTRAVENOUS at 01:24

## 2025-03-05 RX ADMIN — I.V. FAT EMULSION 100 ML: 20 EMULSION INTRAVENOUS at 18:53

## 2025-03-05 RX ADMIN — PIPERACILLIN AND TAZOBACTAM 3375 MG: 3; .375 INJECTION, POWDER, LYOPHILIZED, FOR SOLUTION INTRAVENOUS at 22:10

## 2025-03-05 RX ADMIN — FAMOTIDINE 20 MG: 10 INJECTION, SOLUTION INTRAVENOUS at 10:24

## 2025-03-05 RX ADMIN — AMLODIPINE BESYLATE 5 MG: 5 TABLET ORAL at 22:07

## 2025-03-05 RX ADMIN — ENOXAPARIN SODIUM 40 MG: 100 INJECTION SUBCUTANEOUS at 10:25

## 2025-03-05 RX ADMIN — LISINOPRIL 10 MG: 10 TABLET ORAL at 22:07

## 2025-03-05 ASSESSMENT — PAIN SCALES - GENERAL: PAINLEVEL_OUTOF10: 8

## 2025-03-05 ASSESSMENT — PAIN DESCRIPTION - DESCRIPTORS: DESCRIPTORS: ACHING

## 2025-03-05 ASSESSMENT — PAIN DESCRIPTION - LOCATION: LOCATION: ABDOMEN

## 2025-03-05 NOTE — PROCEDURES
PROCEDURE NOTE  Date: 3/5/2025   Name: Ness Castañeda  YOB: 1950    Procedures      Picc placement order:    Benefits include stable, long term intravenous access. Blood draws. Peripheral vein preservation. Safely deliver vesicant medications.    Risks include failure to obtain the desired result(s) of the procedure, discomfort, injury, the need for additional procedures/therapies, permanent loss of body function, bleeding/bruising, arterial puncture, air embolism, nerve damage, hematoma, phlebitis, catheter fracture/rupture, catheter embolism, catheter occlusion, catheter migration, catheter site infection, unintentional/accidental removal of catheter, bloodstream infection, infiltration, cardiac arrhythmia, vein thrombosis, difficult catheter removal.   Alternatives discussed including centrally inserted central catheter, as well as less invasive procedures such as multiple peripheral IVs, extended dwell catheters and midline placements.     Consent obtained by proceduralist, signed by Patient/DPOA.      Prescribed therapy: TPN  Peripheral ultrasound assessment done. Plan for right brachial vein insertion.     Product type: Antimicrobial/Antithrombogenic Arrow non tapered power injectable PICC  History/Labs/Allergies Reviewed  Placed By: [facility] - [facility] IV Team    Time out Performed using Two Identifiers  Catheter info: 5.5 Jamaican - dual lumen  Total length: 38 cm  External catheter length: 0 cm  Extremity circumference at site: 31 cm  Number of attempts: 1  Estimated blood loss: 1 ml  Placement verified by: positive blood return & flushes easily  Special equipment used: [employer] ECG Tip tracker system, ultrasound, MST, and micro-introducer needle.   Catheter securement: Adhesive securement device  Catheter adhesive (SecureportIV) utilized at insertion site  Dressing applied: Tegaderm CHG  Lidocaine administered intradermally conc.1%: Approx 2 mL     RN aware picc placed with [employer] ECG technology and is

## 2025-03-05 NOTE — PROGRESS NOTES
Comprehensive Nutrition Assessment    Type and Reason for Visit:  Consult (PN order and management)    Nutrition Recommendations/Plan:   Diet NPO Exceptions are: Ice Chips  Start central standard TPN at 41.7 mL/hr (1000 mL total volume) and 100 mL of lipids  Monitor TPN rate, tolerance, GI function, diet advancement and labs     Malnutrition Assessment:  Malnutrition Status:  At risk for malnutrition (03/05/25 1625)      Nutrition Assessment:    Patient admitted for diverticulosis with diverticular disease. Patient is status post robotic assisted open sigmoid resection (2/26/25). Diet was advancing until patient started to not tolerate Low fiber diet. The diet was down graded to Cler liquid on 3/3 and then patient was made NPO yesterday (3/4). Nasogastric tube was placed yesterday set to LIWS (3/3). Patient had 2500 mL of gastric output overnight. Nutrition consult received for PN order and management. Recommend central standard TPN at 41.7 mL/hr (1000 mL total volume) and 100 mL of lipids. Will monitor TPN rate, tolerance, GI function, diet advancement and labs.    Nutrition Related Findings:    Edema: +2 pitting BLE. active bowel sounds. NG tube- LIWS. S/P Probotic assisted open sigmoid resection (2/26/25). Wound Type: Surgical Incision       Current Nutrition Intake & Therapies:    Average Meal Intake: NPO  Average Supplements Intake: NPO  Diet NPO Exceptions are: Ice Chips  PN-Adult 2-in-1 Central Line (Standard)  Current Parenteral Nutrition Orders:  Type and Formula: Premix Central, 2-in-1 Standard   Lipids: 100ml, Daily  Duration: Continuous  Rate/Volume: 41.7 mL/hr (1000 mL total volume)  Current PN Order Provides: 1080 kcal and 50 gm of protein  Goal PN Orders Provides: 2772-4149 kcal and 74-91 gm of protein    Anthropometric Measures:  Height: 165.1 cm (5' 5\")  Ideal Body Weight (IBW): 125 lbs (57 kg)       Current Body Weight: 73.9 kg (162 lb 14.7 oz), 130.3 % IBW. Weight Source: Standing scale  Current  BMI (kg/m2): 27.1  Usual Body Weight: 79.8 kg (176 lb) (11/12/24)     % Weight Change (Calculated): -7.4                    BMI Categories: Overweight (BMI 25.0-29.9)    Estimated Daily Nutrient Needs:  Energy Requirements Based On: Kcal/kg  Weight Used for Energy Requirements: Current  Energy (kcal/day): 2669-8084 kcal (20-25 kcal/kg)  Weight Used for Protein Requirements: Ideal  Protein (g/day): 74-91 gm of protein (1.3-1.6 gm/kg)  Method Used for Fluid Requirements: 1 ml/kcal  Fluid (ml/day): 8082-7962 mL    Nutrition Diagnosis:   Inadequate oral intake related to inadequate protein-energy intake, altered GI function as evidenced by NPO or clear liquid status due to medical condition, nutrition support - parenteral nutrition, GI abnormality    Nutrition Interventions:   Food and/or Nutrient Delivery: Continue NPO, Start Parenteral Nutrition  Nutrition Education/Counseling: Education/Counseling not indicated  Coordination of Nutrition Care: Continue to monitor while inpatient       Goals:  Goals: Meet at least 75% of estimated needs, Tolerate nutrition support at goal rate          Nutrition Monitoring and Evaluation:      Food/Nutrient Intake Outcomes: Parenteral Nutrition Intake/Tolerance  Physical Signs/Symptoms Outcomes: Biochemical Data, Nausea or Vomiting, Skin, Weight, GI Status, Fluid Status or Edema    Discharge Planning:    Too soon to determine         Nadja LAMBN, RDN, LDN  Lead Clinical Dietitian  RD Office Phone [phone]

## 2025-03-05 NOTE — PROGRESS NOTES
Occupational Therapy  [facility]  Occupational Therapy Not Seen    DATE: 3/5/2025    NAME: Ness Castañeda  MRN: [id number]   : 1950    Patient not seen this date for Occupational Therapy due to:      [] Cancel by RN or physician due to:    [] Hemodialysis    [] Critical Lab Value Level     [] Blood transfusion in progress    [] Acute or unstable cardiovascular status   _MAP < 55 or more than >115  _HR < 40 or > 130    [] Acute or unstable pulmonary status   -FiO2 > 60%   _RR < 5 or >40    _O2 sats < 85%    [] Strict Bedrest    [] Off Unit for surgery or procedure    [] Off Unit for testing       [] Pending imaging to R/O fracture    [x] Refusal by Patient: Writer checked back at 1358, pt in chair with 2 visitors present and stated \"I don't think I'm going to do therapy again today, so much is going on and I can only take so much.\" Pt reports doing well in P.T. session this morning and feeling more confident. Will cont to follow.  Writer prepared and printed the following handouts and left in pt's room to go over at next visit:  Access Code: [email or web address]  URL: [email or web address]  Date: 2025     Exercises  - Wrist AROM Flexion Extension  - 1 x daily - 7 x weekly - 3 sets - 10 reps  - Seated Forearm Pronation and Supination AROM  - 1 x daily - 7 x weekly - 3 sets - 10 reps  - Seated Elbow Flexion and Extension AROM  - 1 x daily - 7 x weekly - 3 sets - 10 reps  - Seated Shoulder Shrugs  - 1 x daily - 7 x weekly - 3 sets - 10 reps  - Seated Shoulder Flexion  - 1 x daily - 7 x weekly - 3 sets - 10 reps  - Seated Shoulder External Rotation  - 1 x daily - 7 x weekly - 3 sets - 10 reps     Patient Education  - Abdominal Precautions  - Log Roll  - Adaptive Equipment for Bathing & Showering  - Adaptive Equipment for Dressing  - Putting On and Taking Off Pants Using a Reacher  - Putting On Socks with a Sock Aid  - Taking Off Socks with a Reacher or Dressing Stick  - Putting On Shoes with a  Long-Handled Shoe Horn  - Understanding Energy Conservation  - Energy Conservation During Daily Tasks  - Safe Practices For Preventing Falls  - Leg Elevation for Swelling  - Preventing Pressure Injuries     [x] Other: Pt in bed (2733) getting PICC line placed. Will check back.      [] OT being discontinued at this time. Patient independent. No further needs.     [] OT being discontinued at this time as the patient has been transferred to hospice care. No further needs.      CLARIBEL Alvarez  \

## 2025-03-05 NOTE — PROGRESS NOTES
General Surgery Progress Note            PATIENT NAME: Ness Castañeda     TODAY'S DATE: 3/5/2025, 9:02 AM    SUBJECTIVE:    The patient was seen examined at bedside, no overnight events.  Yesterday, the patient had her NGT replaced and she has had 2500 out since then.  Her drain has put out 220 cc of serosanguineous fluid.  Pain controlled.  VSS, afebrile.    OBJECTIVE:   VITALS:  BP (!) 139/59   Pulse 82   Temp 98.2 °F (36.8 °C)   Resp 17   Ht 1.651 m (5' 5\")   Wt 73.9 kg (163 lb)   LMP 02/24/1998   SpO2 95%   BMI 27.12 kg/m²      INTAKE/OUTPUT:      Intake/Output Summary (Last 24 hours) at 3/5/2025 0902  Last data filed at 3/5/2025 0549  Gross per 24 hour   Intake 2169.5 ml   Output 2335 ml   Net -165.5 ml       PHYSICAL EXAM  GEN: Alert, awake, oriented, NAD  HEENT: normocephalic, no scleral icterus, moist mucous membranes  CV: Regular rate  LUNG: Unlabored breathing on RA  ABDOMEN: Soft, softly distended, appropriately tender to palpation near incisions.  No rebound, guarding, or rigidity.  Incisions are clean and dry with staples intact.   No active bleeding noted. SOFÍA intact with ss output.  EXTREMITIES: No edema, cyanosis or clubbing    Data:  CBC with Differential:    Lab Results   Component Value Date/Time    WBC 16.0 03/05/2025 05:47 AM    RBC 2.84 03/05/2025 05:47 AM    HGB 7.3 03/05/2025 05:47 AM    HCT 24.9 03/05/2025 05:47 AM     03/05/2025 05:47 AM    MCV 87.7 03/05/2025 05:47 AM    MCH 25.7 03/05/2025 05:47 AM    MCHC 29.3 03/05/2025 05:47 AM    RDW 18.5 03/05/2025 05:47 AM    LYMPHOPCT 7 03/05/2025 05:47 AM    MONOPCT 6 03/05/2025 05:47 AM    EOSPCT 2 03/05/2025 05:47 AM    BASOPCT 0 03/05/2025 05:47 AM    MONOSABS 0.93 03/05/2025 05:47 AM    LYMPHSABS 1.17 03/05/2025 05:47 AM    EOSABS 0.38 03/05/2025 05:47 AM    BASOSABS 0.04 03/05/2025 05:47 AM    DIFFTYPE NOT REPORTED 11/08/2016 11:05 AM     BMP:    Lab Results   Component Value Date/Time     03/05/2025 05:47 AM    K 4.0

## 2025-03-05 NOTE — PROGRESS NOTES
Physician Progress Note      PATIENT:               MATILDA ARMENTA  CSN #:                  [id number]  :                       1950  ADMIT DATE:       2025 7:31 AM  DISCH DATE:  RESPONDING  PROVIDER #:        Norma Rios MD          QUERY TEXT:    Pt admitted with Diverticulosis. Pt noted to have wbc- 20.8, 22.7, 16.2,   RR-28, 24, HR- 108, 100. If possible, please document in the progress notes   and discharge summary if you are evaluating and /or treating any of the   following:  The medical record reflects the following:  Risk Factors:  Clinical Indicators: In general surgery progress note dated  documented as   diverticular disease -s/p robot-assisted laparoscopic, converted to open   sigmoid resection.  In OP dated  documented as Infection Present at Time of Surgery: Organ   Space infection (below fascia) present as evidenced by fluid consistent with   infection Inflammatory mass of the sigmoid colon with adherence to the   surrounding tissue.  Some enteric drainage leaked out as we were dissecting   it.  In Surgical pathology Report - Sigmoid colon resection- Abscess Cavity as   well as adhesions with inflammation.  In XR Abdomen - Gas-filled mildly distended large and small bowel loops in   the mid abdomen. No gas in the rectum. Findings most likely represent   postoperative ileus. Suggest follow-up KUB as per clinical. Rounded soft   tissue density right transverse colon, possibly a polyp or  other soft tissue density.  IN XR Abdomen (KUB) 3/4- Mildly dilated loops of small bowel in the upper   abdomen could represent partial small bowel obstruction versus ileus  on - hr-98; rr-24, 28  on - wbc-20.8  on - hr-108, 100; wbc-22.7  on 3/1- wbc-16.2  on 3/2- wbc- 15.2  on 3/3- wbc-14.0  Treatment: IV Ancef, IV 0.9 % sodium chloride infusion, Serial labs, vital   signs, XR abdomen, surgical pathology.        Thank you  ANAIS Vu-CDS  Options provided:  --

## 2025-03-05 NOTE — PROGRESS NOTES
Hospitalist - Progress Note      Patient: Ness Castañeda    Unit/Bed:2018/2018-02  YOB: 1950  MRN: [id number]   Acct: [id number]   PCP: Eri Akbar MD    Date of Service: Pt seen/examined on 03/05/25      Chief Complaint:  medical management    Assessment and Plan:-  Left Colon Sigmoid Mass adhered to the Uterus s/p Left Laprascopic Open Sigmoid Resection with Tap Block  More abd distension, xray with concern for Post Op Ileus. Now has NGT and sxms have improved.   Gen surgery following and managing. On IV antibiotics.   NGT is replaced    Anemia-likely postoperative on chronic anemia  Recheck hgb. Transfuse for hgb <7  Pt is sitting at 7. If she gets symptomatic, such as low BP, Fast HR, dizziness, sob, cp, then I recommend 1 u prbcs.     CKD III  Cr is at baseline.   Avoid nephrotoxic meds    Pre-Diabetes  Monitor glucose.   On metformin. Ok to hold while inpt.     HTN  On norvasc, lisinopril,   If bp >150, will increase norvasc to 10mg and or can increase lisinopril to 20mg qd.     Iron Deficiency  S/p iron infusions x 2     Plan:  Continue home medicines.     Monitor for signs of bleeding.   Gen surgery primary and managing pain , dvt prophy, pt/ot.   ISS, Ac and hs protocol, hypoglycemia protocol.   On cefazolin, flagyl,      Thank you for the consultation. Please call with questions. We will continue to follow along.     Subjective:  Pt seen in room.   Patient has a NGT placed again on 3/4/2025  History Of Present Illness:      74 y F admitted by surgery for Left colon sigmoid mass adhered to the uterus with significant inflammatory process, s/p Lap Robotic assisted open sigmoid resection.   Patient tolerated the procedure well. No acute concerns.   We were consulted for medical management.   Patient hgb 6.8 this morning. Repeat H/H showed hgb 7.1  Consented for blood. Tolerated procedure well.       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 No gas in the rectum. Findings most likely represent postoperative   ileus.  Suggest follow-up KUB as per clinical.   2. Rounded soft tissue density right transverse colon, possibly a polyp or   other soft tissue density.           No results found.      EKG:     Electronically signed by Norma Rios MD on 3/5/2025 at 5:09 PM

## 2025-03-05 NOTE — PLAN OF CARE
Patient A&O x4, ambulating stand by assist with walker  NG in place with 450 mL of green output  SOFÍA drain in place with 110mL output  Midline incision dressing clean, dry, and in tact  No complaints of pain or nausea this shift  Safety maintained  Problem: Discharge Planning  Goal: Discharge to home or other facility with appropriate resources  3/5/2025 0631 by Ying Alex RN  Outcome: Progressing     Problem: Pain  Goal: Verbalizes/displays adequate comfort level or baseline comfort level  3/5/2025 0631 by Ying Alex RN  Outcome: Progressing     Problem: Safety - Adult  Goal: Free from fall injury  3/5/2025 0631 by Ying Alex RN  Outcome: Progressing     Problem: Chronic Conditions and Co-morbidities  Goal: Patient's chronic conditions and co-morbidity symptoms are monitored and maintained or improved  3/5/2025 0631 by Ying Alex RN  Outcome: Progressing     Problem: ABCDS Injury Assessment  Goal: Absence of physical injury  3/5/2025 0631 by Ying Alex RN  Outcome: Progressing     Problem: Skin/Tissue Integrity - Adult  Goal: Incisions, wounds, or drain sites healing without S/S of infection  3/5/2025 0631 by Ying Alex RN  Outcome: Progressing     Problem: Gastrointestinal - Adult  Goal: Minimal or absence of nausea and vomiting  3/5/2025 0631 by Ying Alex RN  Outcome: Progressing     Problem: Gastrointestinal - Adult  Goal: Maintains or returns to baseline bowel function  3/5/2025 0631 by Ying Alex RN  Outcome: Progressing     Problem: Gastrointestinal - Adult  Goal: Maintains adequate nutritional intake  3/5/2025 0631 by Ying Alex RN  Outcome: Progressing     Problem: Genitourinary - Adult  Goal: Absence of urinary retention  3/5/2025 0631 by Ying Alex RN  Outcome: Progressing     Problem: Genitourinary - Adult  Goal: Urinary catheter remains patent  3/5/2025 0631 by Ying Alex RN  Outcome: Progressing

## 2025-03-05 NOTE — PROGRESS NOTES
Physical Therapy  Facility/Department: [facility] SURG  Rehabilitation Physical Therapy Treatment Note    NAME: Ness Castañeda  : 1950 (74 y.o.)  MRN: [id number]  CODE STATUS: Full Code    Date of Service: 3/5/25       Restrictions:  Restrictions/Precautions: Fall Risk, General Precautions  Position Activity Restriction  Other Position/Activity Restrictions: RUE IV, abd binder as needed when up, alarms, SOFÍA drain, NPO with ice chips/NGT, up with assist     SUBJECTIVE  Subjective: pt in chair agreeable to PT       OBJECTIVE  Cognition  Overall Cognitive Status: WFL  Arousal/Alertness: Delayed responses to stimuli  Following Commands: Follows multistep commands with repitition;Follows multistep commands with increased time  Attention Span: Appears intact  Memory: Decreased short term memory  Safety Judgement: Decreased awareness of need for safety;Decreased awareness of need for assistance  Problem Solving: Assistance required to identify errors made;Assistance required to correct errors made;Decreased awareness of errors  Insights: Decreased awareness of deficits  Initiation: Requires cues for some  Sequencing: Requires cues for some  Orientation  Overall Orientation Status: Within Normal Limits  Orientation Level: Oriented X4    Functional Mobility  Scooting  Assistance Level: Stand by assist  Transfers  Surface: From chair with arms;To chair with arms  Additional Factors: Verbal cues  Device: Walker  Sit to Stand  Assistance Level: Contact guard assist;Stand by assist  Stand to Sit  Assistance Level: Contact guard assist;Stand by assist  Bed To/From Chair  Technique: Stand step;Stand pivot  Assistance Level: Stand by assist;Contact guard assist  Stand Pivot  Assistance Level: Contact guard assist;Stand by assist      Environmental Mobility  Ambulation  Surface: Level surface  Device: Rolling walker  Distance: 145 ft  Activity: Within Room;Within Unit  Additional Factors: Verbal cues;Hand placement cues  Assistance  Score    AM-PAC Inpatient Mobility Raw Score : 18  AM-PAC Inpatient T-Scale Score : 43.63  Mobility Inpatient CMS 0-100% Score: 46.58  Mobility Inpatient CMS G-Code Modifier:CK        Therapy Time   Individual Concurrent Group Co-treatment   Time In 1125         Time Out 1150         Minutes 25                   ELENI AUGUSTINE PTA, 03/05/25 at 1:52 PM

## 2025-03-05 NOTE — PLAN OF CARE
A&O x4  No complains of N/V  NG hooked to intermittent suctioning.  Pt walked with family in the halls 3 times today, tolerated very well.   Problem: Discharge Planning  Goal: Discharge to home or other facility with appropriate resources  3/5/2025 1825 by Melanie Shoemaker RN  Outcome: Progressing  Flowsheets (Taken 3/5/2025 1025)  Discharge to home or other facility with appropriate resources:   Identify barriers to discharge with patient and caregiver   Arrange for needed discharge resources and transportation as appropriate   Identify discharge learning needs (meds, wound care, etc)     Problem: Pain  Goal: Verbalizes/displays adequate comfort level or baseline comfort level  3/5/2025 1825 by Melanie Shoemaker RN  Outcome: Progressing     Problem: Safety - Adult  Goal: Free from fall injury  3/5/2025 1825 by Melanie Shoemaker RN  Outcome: Progressing     Problem: Chronic Conditions and Co-morbidities  Goal: Patient's chronic conditions and co-morbidity symptoms are monitored and maintained or improved  3/5/2025 1825 by Melanie Shoemaker RN  Outcome: Progressing  Flowsheets (Taken 3/5/2025 1025)  Care Plan - Patient's Chronic Conditions and Co-Morbidity Symptoms are Monitored and Maintained or Improved: Monitor and assess patient's chronic conditions and comorbid symptoms for stability, deterioration, or improvement     Problem: ABCDS Injury Assessment  Goal: Absence of physical injury  3/5/2025 1825 by Melanie Shoemaker RN  Outcome: Progressing     Problem: Skin/Tissue Integrity - Adult  Goal: Incisions, wounds, or drain sites healing without S/S of infection  3/5/2025 1825 by Melanie Shoemaker RN  Outcome: Progressing  Flowsheets (Taken 3/5/2025 1025)  Incisions, Wounds, or Drain Sites Healing Without Sign and Symptoms of Infection: ADMISSION and DAILY: Assess and document risk factors for pressure ulcer development     Problem: Gastrointestinal - Adult  Goal: Minimal or absence of nausea and vomiting  3/5/2025 1825

## 2025-03-05 NOTE — CARE COORDINATION
DC Planning    Spoke with pt at bedside, family visiting. NG cont pt to be started on TPN-will need to watch for long term use.

## 2025-03-06 ENCOUNTER — APPOINTMENT (OUTPATIENT)
Dept: CT IMAGING | Age: 75
DRG: 329 | End: 2025-03-06
Attending: SURGERY
Payer: MEDICARE

## 2025-03-06 LAB
ALBUMIN SERPL-MCNC: 2.6 G/DL (ref 3.5–5.2)
ALBUMIN/GLOB SERPL: 1.1 {RATIO} (ref 1–2.5)
ALP SERPL-CCNC: 81 U/L (ref 35–104)
ALT SERPL-CCNC: <5 U/L (ref 10–35)
ANION GAP SERPL CALCULATED.3IONS-SCNC: 10 MMOL/L (ref 9–16)
AST SERPL-CCNC: 17 U/L (ref 10–35)
BILIRUB DIRECT SERPL-MCNC: 0.1 MG/DL (ref 0–0.2)
BILIRUB INDIRECT SERPL-MCNC: ABNORMAL MG/DL
BILIRUB SERPL-MCNC: <0.2 MG/DL (ref 0–1.2)
BUN SERPL-MCNC: 13 MG/DL (ref 8–23)
CA-I BLD-SCNC: 1.27 MMOL/L (ref 1.13–1.33)
CALCIUM SERPL-MCNC: 9 MG/DL (ref 8.8–10.2)
CHLORIDE SERPL-SCNC: 108 MMOL/L (ref 98–107)
CO2 SERPL-SCNC: 24 MMOL/L (ref 20–31)
CREAT SERPL-MCNC: 1.4 MG/DL (ref 0.5–0.9)
ERYTHROCYTE [DISTWIDTH] IN BLOOD BY AUTOMATED COUNT: 18.6 % (ref 11.8–14.4)
GFR, ESTIMATED: 40 ML/MIN/1.73M2
GLUCOSE BLD-MCNC: 121 MG/DL (ref 65–105)
GLUCOSE BLD-MCNC: 137 MG/DL (ref 65–105)
GLUCOSE BLD-MCNC: 140 MG/DL (ref 65–105)
GLUCOSE BLD-MCNC: 162 MG/DL (ref 65–105)
GLUCOSE SERPL-MCNC: 152 MG/DL (ref 82–115)
HCT VFR BLD AUTO: 25.4 % (ref 36.3–47.1)
HGB BLD-MCNC: 7.6 G/DL (ref 11.9–15.1)
MAGNESIUM SERPL-MCNC: 2 MG/DL (ref 1.6–2.4)
MCH RBC QN AUTO: 26.4 PG (ref 25.2–33.5)
MCHC RBC AUTO-ENTMCNC: 29.9 G/DL (ref 28.4–34.8)
MCV RBC AUTO: 88.2 FL (ref 82.6–102.9)
NRBC BLD-RTO: 0 PER 100 WBC
PHOSPHATE SERPL-MCNC: 2.9 MG/DL (ref 2.5–4.5)
PLATELET # BLD AUTO: 461 K/UL (ref 138–453)
PMV BLD AUTO: 9 FL (ref 8.1–13.5)
POTASSIUM SERPL-SCNC: 3.8 MMOL/L (ref 3.7–5.3)
PROT SERPL-MCNC: 4.9 G/DL (ref 6.6–8.7)
RBC # BLD AUTO: 2.88 M/UL (ref 3.95–5.11)
SODIUM SERPL-SCNC: 142 MMOL/L (ref 136–145)
TRIGL SERPL-MCNC: 144 MG/DL
WBC OTHER # BLD: 16.6 K/UL (ref 3.5–11.3)

## 2025-03-06 PROCEDURE — 6360000002 HC RX W HCPCS: Performed by: STUDENT IN AN ORGANIZED HEALTH CARE EDUCATION/TRAINING PROGRAM

## 2025-03-06 PROCEDURE — 1200000000 HC SEMI PRIVATE

## 2025-03-06 PROCEDURE — 82330 ASSAY OF CALCIUM: CPT

## 2025-03-06 PROCEDURE — 84100 ASSAY OF PHOSPHORUS: CPT

## 2025-03-06 PROCEDURE — 2580000003 HC RX 258

## 2025-03-06 PROCEDURE — 80076 HEPATIC FUNCTION PANEL: CPT

## 2025-03-06 PROCEDURE — 6360000002 HC RX W HCPCS

## 2025-03-06 PROCEDURE — 82947 ASSAY GLUCOSE BLOOD QUANT: CPT

## 2025-03-06 PROCEDURE — 2580000003 HC RX 258: Performed by: SURGERY

## 2025-03-06 PROCEDURE — 97535 SELF CARE MNGMENT TRAINING: CPT

## 2025-03-06 PROCEDURE — 36415 COLL VENOUS BLD VENIPUNCTURE: CPT

## 2025-03-06 PROCEDURE — 2500000003 HC RX 250 WO HCPCS: Performed by: SURGERY

## 2025-03-06 PROCEDURE — 6360000004 HC RX CONTRAST MEDICATION: Performed by: STUDENT IN AN ORGANIZED HEALTH CARE EDUCATION/TRAINING PROGRAM

## 2025-03-06 PROCEDURE — 2500000003 HC RX 250 WO HCPCS: Performed by: STUDENT IN AN ORGANIZED HEALTH CARE EDUCATION/TRAINING PROGRAM

## 2025-03-06 PROCEDURE — 83735 ASSAY OF MAGNESIUM: CPT

## 2025-03-06 PROCEDURE — 85027 COMPLETE CBC AUTOMATED: CPT

## 2025-03-06 PROCEDURE — 74176 CT ABD & PELVIS W/O CONTRAST: CPT

## 2025-03-06 PROCEDURE — 2580000003 HC RX 258: Performed by: STUDENT IN AN ORGANIZED HEALTH CARE EDUCATION/TRAINING PROGRAM

## 2025-03-06 PROCEDURE — 97530 THERAPEUTIC ACTIVITIES: CPT

## 2025-03-06 PROCEDURE — 6370000000 HC RX 637 (ALT 250 FOR IP): Performed by: STUDENT IN AN ORGANIZED HEALTH CARE EDUCATION/TRAINING PROGRAM

## 2025-03-06 PROCEDURE — 80048 BASIC METABOLIC PNL TOTAL CA: CPT

## 2025-03-06 PROCEDURE — 84478 ASSAY OF TRIGLYCERIDES: CPT

## 2025-03-06 PROCEDURE — 2500000003 HC RX 250 WO HCPCS: Performed by: FAMILY MEDICINE

## 2025-03-06 RX ORDER — IOPAMIDOL 755 MG/ML
18 INJECTION, SOLUTION INTRAVASCULAR
Status: COMPLETED | OUTPATIENT
Start: 2025-03-06 | End: 2025-03-06

## 2025-03-06 RX ORDER — MORPHINE SULFATE 2 MG/ML
2 INJECTION, SOLUTION INTRAMUSCULAR; INTRAVENOUS
Status: DISCONTINUED | OUTPATIENT
Start: 2025-03-06 | End: 2025-03-13 | Stop reason: HOSPADM

## 2025-03-06 RX ORDER — MORPHINE SULFATE 4 MG/ML
4 INJECTION, SOLUTION INTRAMUSCULAR; INTRAVENOUS
Status: DISCONTINUED | OUTPATIENT
Start: 2025-03-06 | End: 2025-03-13 | Stop reason: HOSPADM

## 2025-03-06 RX ADMIN — PIPERACILLIN AND TAZOBACTAM 3375 MG: 3; .375 INJECTION, POWDER, LYOPHILIZED, FOR SOLUTION INTRAVENOUS at 13:49

## 2025-03-06 RX ADMIN — SODIUM CHLORIDE, POTASSIUM CHLORIDE, SODIUM LACTATE AND CALCIUM CHLORIDE: 600; 310; 30; 20 INJECTION, SOLUTION INTRAVENOUS at 02:15

## 2025-03-06 RX ADMIN — FAMOTIDINE 20 MG: 10 INJECTION, SOLUTION INTRAVENOUS at 10:09

## 2025-03-06 RX ADMIN — I.V. FAT EMULSION 100 ML: 20 EMULSION INTRAVENOUS at 18:13

## 2025-03-06 RX ADMIN — SODIUM CHLORIDE, POTASSIUM CHLORIDE, SODIUM LACTATE AND CALCIUM CHLORIDE: 600; 310; 30; 20 INJECTION, SOLUTION INTRAVENOUS at 20:57

## 2025-03-06 RX ADMIN — SODIUM CHLORIDE, PRESERVATIVE FREE 10 ML: 5 INJECTION INTRAVENOUS at 21:36

## 2025-03-06 RX ADMIN — CALCIUM GLUCONATE: 98 INJECTION INTRAVENOUS at 18:12

## 2025-03-06 RX ADMIN — PIPERACILLIN AND TAZOBACTAM 3375 MG: 3; .375 INJECTION, POWDER, LYOPHILIZED, FOR SOLUTION INTRAVENOUS at 21:37

## 2025-03-06 RX ADMIN — PIPERACILLIN AND TAZOBACTAM 3375 MG: 3; .375 INJECTION, POWDER, LYOPHILIZED, FOR SOLUTION INTRAVENOUS at 05:07

## 2025-03-06 RX ADMIN — IOPAMIDOL 18 ML: 755 INJECTION, SOLUTION INTRAVENOUS at 12:27

## 2025-03-06 NOTE — PROGRESS NOTES
Pt has had 3 loose bowel movements. One small and two medium in size. No complaint of N/V and very mild pain without the need of pain medications. NG has been clamped since 11am

## 2025-03-06 NOTE — PROGRESS NOTES
Physical Therapy  DATE: 3/6/2025    NAME: Ness Castañeda  MRN: [id number]   : 1950    Patient not seen this date for Physical Therapy due to:      [] Cancel by RN or physician due to:    [] Hemodialysis    [] Critical Lab Value Level     [] Blood transfusion in progress    [] Acute or unstable cardiovascular status   _MAP < 55 or more than >115  _HR < 40 or > 130    [] Acute or unstable pulmonary status   -FiO2 > 60%   _RR < 5 or >40    _O2 sats < 85%    [] Strict Bedrest    [] Off Unit for surgery or procedure    [] Off Unit for testing       [] Pending imaging to R/O fracture    [] Refusal by Patient      [x] Other Pt preparing for CT per RN will check back later as time permits     [] PT being discontinued at this time. Patient independent. No further needs.     [] PT being discontinued at this time as the patient has been transferred to hospice care. No further needs.      ELENI AUGUSTINE, PTA

## 2025-03-06 NOTE — PLAN OF CARE
Problem: Discharge Planning  Goal: Discharge to home or other facility with appropriate resources  3/5/2025 2352 by Yoon Taylor RN  Outcome: Progressing  Flowsheets (Taken 3/5/2025 2000)  Discharge to home or other facility with appropriate resources:   Identify barriers to discharge with patient and caregiver   Arrange for needed discharge resources and transportation as appropriate   Identify discharge learning needs (meds, wound care, etc)   Refer to discharge planning if patient needs post-hospital services based on physician order or complex needs related to functional status, cognitive ability or social support system     Problem: Pain  Goal: Verbalizes/displays adequate comfort level or baseline comfort level  3/5/2025 2352 by Yoon Taylor RN  Outcome: Progressing     Problem: Chronic Conditions and Co-morbidities  Goal: Patient's chronic conditions and co-morbidity symptoms are monitored and maintained or improved  3/5/2025 2352 by Yoon Taylor RN  Outcome: Progressing  Flowsheets (Taken 3/5/2025 2000)  Care Plan - Patient's Chronic Conditions and Co-Morbidity Symptoms are Monitored and Maintained or Improved:   Monitor and assess patient's chronic conditions and comorbid symptoms for stability, deterioration, or improvement   Collaborate with multidisciplinary team to address chronic and comorbid conditions and prevent exacerbation or deterioration   Update acute care plan with appropriate goals if chronic or comorbid symptoms are exacerbated and prevent overall improvement and discharge     Problem: Gastrointestinal - Adult  Goal: Minimal or absence of nausea and vomiting  3/5/2025 2352 by Yoon Taylor RN  Outcome: Progressing  Flowsheets (Taken 3/5/2025 2000)  Minimal or absence of nausea and vomiting:   Administer IV fluids as ordered to ensure adequate hydration   Nasogastric tube to low intermittent suction as ordered   Administer ordered antiemetic medications as needed   Provide  nonpharmacologic comfort measures as appropriate     Problem: Gastrointestinal - Adult  Goal: Maintains or returns to baseline bowel function  3/5/2025 2352 by Yoon Taylor, RN  Outcome: Progressing  Flowsheets (Taken 3/5/2025 2000)  Maintains or returns to baseline bowel function:   Assess bowel function   Administer IV fluids as ordered to ensure adequate hydration   Administer ordered medications as needed   Encourage mobilization and activity     Problem: Gastrointestinal - Adult  Goal: Maintains adequate nutritional intake  3/5/2025 2352 by Yoon Taylor, RN  Outcome: Progressing     Problem: Nutrition Deficit:  Goal: Optimize nutritional status  3/5/2025 2352 by Yoon Taylor, RN  Outcome: Progressing

## 2025-03-06 NOTE — PLAN OF CARE
Provide nonpharmacologic comfort measures as appropriate     Problem: Gastrointestinal - Adult  Goal: Maintains or returns to baseline bowel function  Outcome: Progressing  Flowsheets (Taken 3/6/2025 1009)  Maintains or returns to baseline bowel function:   Assess bowel function   Encourage oral fluids to ensure adequate hydration   Administer IV fluids as ordered to ensure adequate hydration     Problem: Gastrointestinal - Adult  Goal: Maintains adequate nutritional intake  Outcome: Progressing  Flowsheets (Taken 3/6/2025 1009)  Maintains adequate nutritional intake:   Monitor percentage of each meal consumed   Identify factors contributing to decreased intake, treat as appropriate   Assist with meals as needed     Problem: Genitourinary - Adult  Goal: Absence of urinary retention  Outcome: Progressing  Flowsheets (Taken 3/6/2025 1009)  Absence of urinary retention:   Assess patient’s ability to void and empty bladder   Monitor intake/output and perform bladder scan as needed     Problem: Genitourinary - Adult  Goal: Urinary catheter remains patent  Outcome: Progressing  Flowsheets (Taken 3/6/2025 1009)  Urinary catheter remains patent: Assess patency of urinary catheter     Problem: Nutrition Deficit:  Goal: Optimize nutritional status  Outcome: Progressing  Flowsheets (Taken 3/6/2025 1610 by Nadja Gonzalez, RD, LD)  Nutrient intake appropriate for improving, restoring, or maintaining nutritional needs:   Assess nutritional status and recommend course of action   Recommend, monitor, and adjust tube feedings and TPN/PPN based on assessed needs

## 2025-03-06 NOTE — PROGRESS NOTES
RN reached out to Dr. [place] resident to please come discuss CT results with patient, they replied that they are in OR and will review when able. Patient notified.

## 2025-03-06 NOTE — PROGRESS NOTES
recliner/toilet to complete self care tasks)  Stand to Sit: Stand by assistance  Bed to Chair: Stand by assistance  Toilet Transfer: Stand by assistance (cues for hand placement on grab bar)     ADL  Grooming: Setup;Supervision  Grooming Skilled Clinical Factors: to wash face seated in recliner  UE Bathing: Setup;Stand by assistance  UE Bathing Skilled Clinical Factors: for sponge bath seated in recliner; cues for proper use of medicated soap/gentle wash over incisions and around abd area  LE Bathing: Setup;Stand by assistance  LE Bathing Skilled Clinical Factors: to stand and wash val area with use of RW; pt seated/crossed and washed BLE's following set up  UE Dressing: Setup;Minimal assistance (min assist for abd binder proper wear/adjustment)  UE Dressing Skilled Clinical Factors: min assist donning clean hosp gown  LE Dressing: Setup;Minimal assistance  LE Dressing Skilled Clinical Factors: assist with donning L sock as pt unable due to fatigue; pt was able to don clean R sock though following set up/SBA while crossing BLE's.  Pt was also able to thread BLE's in clean pull up brief with increased time/crossing BLE's.  SBA to stand with RW for pulling brief up  Toileting: Minimal assistance  Toileting Skilled Clinical Factors: with hosp gown mgt; SBA standing with RW for pull up brief down and up; pt urinated and with small BM and SBA to stand with RW for all hygiene-PCT informed of urination and BM  Functional Mobility: Stand by assistance (recliner to toilet, toilet back to recliner with use of RW and with increased time)  Functional Mobility Skilled Clinical Factors: MOD cues for upright postur, looking and scanning room vs taring down at the floor, staying inside AD/keeping close to body and awareness/assist with lines to increase safety/reduce falls.        Safety Devices  Type of Devices: Call light within reach;Chair alarm in place;Gait belt;Heels elevated for pressure relief;Left in chair;Nurse  notified;Patient at risk for falls (BLE's elevated in recliner/pillow under)    Patient Education  Education Given To: Patient  Education Provided: Role of Therapy;Fall Prevention Strategies;ADL Adaptive Strategies;Energy Conservation;Precautions;Plan of Care;Transfer Training;Mobility Training  Education Provided Comments: use of medicated soap, abd prec, safety in function, recommendations for continued therapy  Education Method: Demonstration;Verbal  Barriers to Learning: Cognition  Education Outcome: Verbalized understanding;Demonstrated understanding;Continued education needed    Goals  Short Term Goals  Time Frame for Short Term Goals: by discharge, pt to demo  Short Term Goal 1: bed mob tasks with use of rail/proper log rolling tech to SUP.  Short Term Goal 2: I with BUE HEP with use of hanouts to maintain functiona use.  Short Term Goal 3: total body ADL tasks with use of grab bar/AD and AD as needed to SUP.  Short Term Goal 4: ADL transfers and functional mob with AD to SUP level  Short Term Goal 5: standing to SUP and AD raj > 6 mins as able to reduce falls in function.  Long Term Goals  Long Term Goal 1: Pt/caregivers to be I with pressure relief, edema mgt tech, EC/WS and fall prevention tech, DME/AE recommendations with use of handouts.  Patient Goals   Patient goals : Pt states she is hoping to go home in the next 2 days!    AM-PAC - ADL   19    Therapy Time   Individual Concurrent Group Co-treatment   Time In 1252         Time Out 1331         Minutes 39                 Angie Rosales, OT

## 2025-03-06 NOTE — PROGRESS NOTES
Comprehensive Nutrition Assessment    Type and Reason for Visit:  Reassess    Nutrition Recommendations/Plan:   Diet NPO Exceptions are: Ice Chips  Increase central standard TPN to 60 mL/hr (1440 mL total volume) and 100 mL of lipids  Monitor TPN rate, tolerance, GI function, diet advancement and labs     Malnutrition Assessment:  Malnutrition Status:  At risk for malnutrition (03/05/25 1625)        Nutrition Assessment:    Patient remains NPO status post open sigmoid resection (2/26/25) due to diet intolerance. Will continue central standard TPN and increase rate to 60 mL/hr (1440 mL total volume) and 100 mL lipids. Patient my need TPN for an extended amount of time. Monitor TPN rate, tolerance, GI function, diet advancement and labs.    Nutrition Related Findings:    Edema: trace BLE. Active bowel sounds, flatus. NG tube clamping trial (3/6). S/P Probotic assisted open sigmoid resection (2/26/25) Wound Type: Surgical Incision       Current Nutrition Intake & Therapies:    Average Meal Intake: NPO  Average Supplements Intake: NPO  Diet NPO Exceptions are: Ice Chips  PN-Adult 2-in-1 Central Line (Standard)  PN-Adult 2-in-1 Central Line (Standard)  Current Parenteral Nutrition Orders:  Type and Formula: Premix Central, 2-in-1 Standard   Lipids: 100ml, Daily  Duration: Continuous  Rate/Volume: 60 mL/hr (1440 mL total volume)  Current PN Order Provides: 1467 kcal and 72 gm of protein  Goal PN Orders Provides: 6461-8729 kcal and 74-91 gm of protein    Anthropometric Measures:  Height: 165.1 cm (5' 5\")  Ideal Body Weight (IBW): 125 lbs (57 kg)       Current Body Weight: 76.9 kg (169 lb 8.5 oz), 135.6 % IBW. Weight Source: Standing scale  Current BMI (kg/m2): 28.2  Usual Body Weight: 79.8 kg (176 lb) (11/12/24)     % Weight Change (Calculated): -7.4                    BMI Categories: Overweight (BMI 25.0-29.9)    Estimated Daily Nutrient Needs:  Energy Requirements Based On: Kcal/kg  Weight Used for Energy Requirements:

## 2025-03-06 NOTE — PROGRESS NOTES
Hospitalist - Progress Note      Patient: Ness Castañeda    Unit/Bed:2018/2018-02  YOB: 1950  MRN: [id number]   Acct: [id number]   PCP: Eri Akbar MD    Date of Service: Pt seen/examined on 03/06/25      Chief Complaint:  medical management    Assessment and Plan:-  Left Colon Sigmoid Mass adhered to the Uterus s/p Left Laprascopic Open Sigmoid Resection with Tap Block  More abd distension, xray with concern for Post Op Ileus. Now has NGT and sxms have improved.   Gen surgery following and managing. On IV antibiotics.   NGT is replaced  Currently NGT is clamped  Repeat CT abdomen reviewed  TPN by general surgery  IV fluids.  Recommended to decrease IV fluids due to peripheral edema    Anemia-likely postoperative on chronic anemia  Recheck hgb. Transfuse for hgb <7  Pt is sitting at 7. If she gets symptomatic, such as low BP, Fast HR, dizziness, sob, cp, then I recommend 1 u prbcs.     CKD III  Cr is at baseline.   Avoid nephrotoxic meds    Pre-Diabetes  Monitor glucose.   On metformin. Ok to hold while inpt.     HTN  On norvasc, lisinopril,   If bp >150, will increase norvasc to 10mg and or can increase lisinopril to 20mg qd.     Iron Deficiency  S/p iron infusions x 2     Plan:  Continue home medicines.     Monitor for signs of bleeding.   Gen surgery primary and managing pain , dvt prophy, pt/ot.   ISS, Ac and hs protocol, hypoglycemia protocol.         Thank you for the consultation. Please call with questions. We will continue to follow along.     Subjective:  Pt seen in room.   Patient has a NGT placed again on 3/4/2025  History Of Present Illness:      74 y F admitted by surgery for Left colon sigmoid mass adhered to the uterus with significant inflammatory process, s/p Lap Robotic assisted open sigmoid resection.   Patient tolerated the procedure well. No acute concerns.   We were consulted for medical management.   Patient hgb 6.8 this morning. Repeat H/H showed hgb 7.1  Consented    3. Cholelithiasis.   4. Trace bilateral pleural effusions with adjacent airspace opacities in the   lower lobes which could represent atelectasis or pneumonia.   5. 1.2 cm right adrenal adenoma.  No follow-up imaging is recommended.   6. Air in the bladder which could be related to recent instrumentation or   cystitis.         XR ABDOMEN FOR NG/OG/NE TUBE PLACEMENT   Final Result   1. Nasogastric tube terminating in the gastric body.         XR CHEST PORTABLE   Final Result   No acute airspace disease identified.         XR ABDOMEN (KUB) (SINGLE AP VIEW)   Final Result   Mildly dilated loops of small bowel in the upper abdomen could represent   partial small bowel obstruction versus ileus         XR ABDOMEN FOR NG/OG/NE TUBE PLACEMENT   Final Result   Enteric tube with the tip and side-port in the stomach.         XR ABDOMEN (KUB) (SINGLE AP VIEW)   Final Result   1. Gas-filled mildly distended large and small bowel loops in the mid   abdomen. No gas in the rectum. Findings most likely represent postoperative   ileus.  Suggest follow-up KUB as per clinical.   2. Rounded soft tissue density right transverse colon, possibly a polyp or   other soft tissue density.           No results found.      EKG:     Electronically signed by Norma Rios MD on 3/6/2025 at 5:32 PM

## 2025-03-06 NOTE — PROGRESS NOTES
General Surgery Progress Note            PATIENT NAME: Ness Castañeda     TODAY'S DATE: 3/6/2025, 5:42 AM    SUBJECTIVE:    The patient was seen examined at bedside, no overnight events.  Patient reports that she had 2 very small bowel movements yesterday, 1 loose and 1 small formed.  200 cc serosanguineous drain output.  Pain is controlled.  Reports she ambulated and got up to the chair yesterday.  VSS, afebrile.    OBJECTIVE:   VITALS:  BP 90/62   Pulse 71   Temp 97.9 °F (36.6 °C) (Oral)   Resp 16   Ht 1.651 m (5' 5\")   Wt 76.9 kg (169 lb 8 oz)   LMP 02/24/1998   SpO2 95%   BMI 28.21 kg/m²      INTAKE/OUTPUT:      Intake/Output Summary (Last 24 hours) at 3/6/2025 0542  Last data filed at 3/6/2025 0407  Gross per 24 hour   Intake --   Output 1210 ml   Net -1210 ml       PHYSICAL EXAM  GEN: Alert, awake, oriented, NAD  HEENT: normocephalic, no scleral icterus, moist mucous membranes  CV: Regular rate and rhythm  LUNG: Unlabored breathing on RA  ABDOMEN: Soft, softly distended, mildly tender to palpation near incisions.  No rebound, guarding, or rigidity.  Incisions are clean and dry with staples intact.   No active bleeding noted. SOFÍA intact with ss output.  EXTREMITIES: No edema, cyanosis or clubbing    Data:  CBC with Differential:    Lab Results   Component Value Date/Time    WBC 16.0 03/05/2025 05:47 AM    RBC 2.84 03/05/2025 05:47 AM    HGB 7.3 03/05/2025 05:47 AM    HCT 24.9 03/05/2025 05:47 AM     03/05/2025 05:47 AM    MCV 87.7 03/05/2025 05:47 AM    MCH 25.7 03/05/2025 05:47 AM    MCHC 29.3 03/05/2025 05:47 AM    RDW 18.5 03/05/2025 05:47 AM    LYMPHOPCT 7 03/05/2025 05:47 AM    MONOPCT 6 03/05/2025 05:47 AM    EOSPCT 2 03/05/2025 05:47 AM    BASOPCT 0 03/05/2025 05:47 AM    MONOSABS 0.93 03/05/2025 05:47 AM    LYMPHSABS 1.17 03/05/2025 05:47 AM    EOSABS 0.38 03/05/2025 05:47 AM    BASOSABS 0.04 03/05/2025 05:47 AM    DIFFTYPE NOT REPORTED 11/08/2016 11:05 AM     BMP:    Lab Results

## 2025-03-07 LAB
ALBUMIN SERPL-MCNC: 2.7 G/DL (ref 3.5–5.2)
ALBUMIN/GLOB SERPL: 1.2 {RATIO} (ref 1–2.5)
ALP SERPL-CCNC: 76 U/L (ref 35–104)
ALT SERPL-CCNC: <5 U/L (ref 10–35)
ANION GAP SERPL CALCULATED.3IONS-SCNC: 9 MMOL/L (ref 9–16)
AST SERPL-CCNC: 17 U/L (ref 10–35)
BILIRUB DIRECT SERPL-MCNC: <0.1 MG/DL (ref 0–0.2)
BILIRUB INDIRECT SERPL-MCNC: ABNORMAL MG/DL
BILIRUB SERPL-MCNC: <0.2 MG/DL (ref 0–1.2)
BUN SERPL-MCNC: 13 MG/DL (ref 8–23)
CA-I BLD-SCNC: 1.3 MMOL/L (ref 1.13–1.33)
CALCIUM SERPL-MCNC: 9 MG/DL (ref 8.8–10.2)
CHLORIDE SERPL-SCNC: 108 MMOL/L (ref 98–107)
CO2 SERPL-SCNC: 23 MMOL/L (ref 20–31)
CREAT SERPL-MCNC: 1.2 MG/DL (ref 0.5–0.9)
GFR, ESTIMATED: 46 ML/MIN/1.73M2
GLUCOSE BLD-MCNC: 120 MG/DL (ref 65–105)
GLUCOSE BLD-MCNC: 128 MG/DL (ref 65–105)
GLUCOSE BLD-MCNC: 147 MG/DL (ref 65–105)
GLUCOSE BLD-MCNC: 147 MG/DL (ref 65–105)
GLUCOSE BLD-MCNC: 184 MG/DL (ref 65–105)
GLUCOSE SERPL-MCNC: 119 MG/DL (ref 82–115)
MAGNESIUM SERPL-MCNC: 1.9 MG/DL (ref 1.6–2.4)
PHOSPHATE SERPL-MCNC: 2.7 MG/DL (ref 2.5–4.5)
POTASSIUM SERPL-SCNC: 3.6 MMOL/L (ref 3.7–5.3)
PROT SERPL-MCNC: 5 G/DL (ref 6.6–8.7)
SODIUM SERPL-SCNC: 141 MMOL/L (ref 136–145)

## 2025-03-07 PROCEDURE — 83735 ASSAY OF MAGNESIUM: CPT

## 2025-03-07 PROCEDURE — 2580000003 HC RX 258: Performed by: SURGERY

## 2025-03-07 PROCEDURE — 6360000002 HC RX W HCPCS

## 2025-03-07 PROCEDURE — 2580000003 HC RX 258

## 2025-03-07 PROCEDURE — 2580000003 HC RX 258: Performed by: STUDENT IN AN ORGANIZED HEALTH CARE EDUCATION/TRAINING PROGRAM

## 2025-03-07 PROCEDURE — 97116 GAIT TRAINING THERAPY: CPT

## 2025-03-07 PROCEDURE — 6360000002 HC RX W HCPCS: Performed by: SURGERY

## 2025-03-07 PROCEDURE — 6370000000 HC RX 637 (ALT 250 FOR IP)

## 2025-03-07 PROCEDURE — 2500000003 HC RX 250 WO HCPCS: Performed by: STUDENT IN AN ORGANIZED HEALTH CARE EDUCATION/TRAINING PROGRAM

## 2025-03-07 PROCEDURE — 1200000000 HC SEMI PRIVATE

## 2025-03-07 PROCEDURE — 97530 THERAPEUTIC ACTIVITIES: CPT

## 2025-03-07 PROCEDURE — 80076 HEPATIC FUNCTION PANEL: CPT

## 2025-03-07 PROCEDURE — 97535 SELF CARE MNGMENT TRAINING: CPT

## 2025-03-07 PROCEDURE — 97110 THERAPEUTIC EXERCISES: CPT

## 2025-03-07 PROCEDURE — 2500000003 HC RX 250 WO HCPCS: Performed by: SURGERY

## 2025-03-07 PROCEDURE — 82947 ASSAY GLUCOSE BLOOD QUANT: CPT

## 2025-03-07 PROCEDURE — 6370000000 HC RX 637 (ALT 250 FOR IP): Performed by: STUDENT IN AN ORGANIZED HEALTH CARE EDUCATION/TRAINING PROGRAM

## 2025-03-07 PROCEDURE — 6370000000 HC RX 637 (ALT 250 FOR IP): Performed by: SURGERY

## 2025-03-07 PROCEDURE — 6360000002 HC RX W HCPCS: Performed by: STUDENT IN AN ORGANIZED HEALTH CARE EDUCATION/TRAINING PROGRAM

## 2025-03-07 PROCEDURE — 80048 BASIC METABOLIC PNL TOTAL CA: CPT

## 2025-03-07 PROCEDURE — 82330 ASSAY OF CALCIUM: CPT

## 2025-03-07 PROCEDURE — 36415 COLL VENOUS BLD VENIPUNCTURE: CPT

## 2025-03-07 PROCEDURE — 84100 ASSAY OF PHOSPHORUS: CPT

## 2025-03-07 RX ADMIN — SODIUM CHLORIDE, POTASSIUM CHLORIDE, SODIUM LACTATE AND CALCIUM CHLORIDE: 600; 310; 30; 20 INJECTION, SOLUTION INTRAVENOUS at 22:48

## 2025-03-07 RX ADMIN — ACETAMINOPHEN 1000 MG: 500 TABLET ORAL at 14:15

## 2025-03-07 RX ADMIN — PIPERACILLIN AND TAZOBACTAM 3375 MG: 3; .375 INJECTION, POWDER, LYOPHILIZED, FOR SOLUTION INTRAVENOUS at 14:14

## 2025-03-07 RX ADMIN — FAMOTIDINE 20 MG: 10 INJECTION, SOLUTION INTRAVENOUS at 08:08

## 2025-03-07 RX ADMIN — INSULIN LISPRO 1 UNITS: 100 INJECTION, SOLUTION INTRAVENOUS; SUBCUTANEOUS at 23:09

## 2025-03-07 RX ADMIN — I.V. FAT EMULSION 100 ML: 20 EMULSION INTRAVENOUS at 16:45

## 2025-03-07 RX ADMIN — PIPERACILLIN AND TAZOBACTAM 3375 MG: 3; .375 INJECTION, POWDER, LYOPHILIZED, FOR SOLUTION INTRAVENOUS at 05:29

## 2025-03-07 RX ADMIN — LISINOPRIL 10 MG: 10 TABLET ORAL at 22:06

## 2025-03-07 RX ADMIN — SODIUM CHLORIDE, POTASSIUM CHLORIDE, SODIUM LACTATE AND CALCIUM CHLORIDE: 600; 310; 30; 20 INJECTION, SOLUTION INTRAVENOUS at 05:30

## 2025-03-07 RX ADMIN — AMLODIPINE BESYLATE 5 MG: 5 TABLET ORAL at 22:06

## 2025-03-07 RX ADMIN — PIPERACILLIN AND TAZOBACTAM 3375 MG: 3; .375 INJECTION, POWDER, LYOPHILIZED, FOR SOLUTION INTRAVENOUS at 22:09

## 2025-03-07 RX ADMIN — LISINOPRIL 10 MG: 10 TABLET ORAL at 08:08

## 2025-03-07 RX ADMIN — ENOXAPARIN SODIUM 40 MG: 100 INJECTION SUBCUTANEOUS at 08:09

## 2025-03-07 RX ADMIN — CALCIUM GLUCONATE: 98 INJECTION INTRAVENOUS at 17:42

## 2025-03-07 RX ADMIN — ONDANSETRON 4 MG: 2 INJECTION, SOLUTION INTRAMUSCULAR; INTRAVENOUS at 22:49

## 2025-03-07 ASSESSMENT — PAIN SCALES - GENERAL
PAINLEVEL_OUTOF10: 2
PAINLEVEL_OUTOF10: 4

## 2025-03-07 ASSESSMENT — PAIN DESCRIPTION - PAIN TYPE: TYPE: SURGICAL PAIN

## 2025-03-07 ASSESSMENT — PAIN DESCRIPTION - DESCRIPTORS: DESCRIPTORS: ACHING

## 2025-03-07 ASSESSMENT — PAIN DESCRIPTION - FREQUENCY: FREQUENCY: CONTINUOUS

## 2025-03-07 ASSESSMENT — PAIN DESCRIPTION - LOCATION: LOCATION: GENERALIZED

## 2025-03-07 ASSESSMENT — PAIN DESCRIPTION - ONSET: ONSET: ON-GOING

## 2025-03-07 ASSESSMENT — PAIN - FUNCTIONAL ASSESSMENT: PAIN_FUNCTIONAL_ASSESSMENT: PREVENTS OR INTERFERES SOME ACTIVE ACTIVITIES AND ADLS

## 2025-03-07 NOTE — PROGRESS NOTES
Occupational Therapy  Facility/Department: [facility]  Daily Treatment Note  NAME: Ness Castañeda  : 1950  MRN: [id number]    Date of Service: 3/7/2025    RN reports patient is medically stable for therapy treatment this date.    Chart reviewed prior to treatment and patient is agreeable for therapy.  All lines intact and patient positioned comfortably at end of treatment.  All patient needs addressed prior to ending therapy session.      Discharge Recommendations:  Patient would benefit from continued therapy after discharge  Due to recent hospitalization and medical condition, pt would benefit from additional intermittent skilled therapy at time of discharge.  Please refer to the AM-PAC score for current functional status.   OT Equipment Recommendations  Equipment Needed: Yes  Mobility Devices: ADL Assistive Devices  ADL Assistive Devices: Reacher;Long-handled Shoe Horn;Long-handled Sponge;Emergency Alert System    Patient Diagnosis(es): The encounter diagnosis was Diverticulosis.     Assessment   Assessment: Pt pleasant this date. Pt needing SBA for all mobility, and increased A with LB ADLs d/t pain with figure 4 tech. Pt needing A with set up for UB bathing and SUP d/t line managemnet. Pt tolerating session fair. Pt seated in chair to complete grooming. Pt needing increased time this date. Pt would benefit from continued OT to increase indep with ADLs/IADLs for d/c.  Activity Tolerance: Patient limited by endurance;Patient limited by pain  Discharge Recommendations: Patient would benefit from continued therapy after discharge  Equipment Needed: Yes  Mobility Devices: ADL Assistive Devices     Plan  Occupational Therapy Plan  Times Per Week: 5x/week 1x/day as raj  Current Treatment Recommendations: Strengthening;Balance training;Functional mobility training;Endurance training;Safety education & training;Pain management;Neuromuscular re-education;Equipment evaluation, education, & procurement;Self-Care /  use of grab bar/AD and AD as needed to SUP.  Short Term Goal 4: ADL transfers and functional mob with AD to SUP level  Short Term Goal 5: standing to SUP and AD raj > 6 mins as able to reduce falls in function.  Long Term Goals  Long Term Goal 1: Pt/caregivers to be I with pressure relief, edema mgt tech, EC/WS and fall prevention tech, DME/AE recommendations with use of handouts.  Patient Goals   Patient goals : Pt states she is hoping to go home in the next 2 days!    AM-PAC - ADL  AM-PAC Daily Activity - Inpatient   How much help is needed for putting on and taking off regular lower body clothing?: A Lot  How much help is needed for bathing (which includes washing, rinsing, drying)?: A Lot  How much help is needed for toileting (which includes using toilet, bedpan, or urinal)?: A Little  How much help is needed for putting on and taking off regular upper body clothing?: A Little  How much help is needed for taking care of personal grooming?: None  How much help for eating meals?: None  AM-PAC Inpatient Daily Activity Raw Score: 18  AM-PAC Inpatient ADL T-Scale Score : 38.66  ADL Inpatient CMS 0-100% Score: 46.65  ADL Inpatient CMS G-Code Modifier : CK    Therapy Time   Individual Concurrent Group Co-treatment   Time In 0919         Time Out 1009 (+5 for BUE HEP hand out.)         Minutes 50+5=55               ROBERTO Salguero, OTR/L

## 2025-03-07 NOTE — PLAN OF CARE
Problem: Discharge Planning  Goal: Discharge to home or other facility with appropriate resources  3/7/2025 1454 by Jonathon Craig RN  Outcome: Progressing  3/7/2025 0144 by Yoon Taylor RN  Outcome: Progressing  Flowsheets (Taken 3/6/2025 2000)  Discharge to home or other facility with appropriate resources:   Identify barriers to discharge with patient and caregiver   Arrange for needed discharge resources and transportation as appropriate   Identify discharge learning needs (meds, wound care, etc)   Refer to discharge planning if patient needs post-hospital services based on physician order or complex needs related to functional status, cognitive ability or social support system     Problem: Pain  Goal: Verbalizes/displays adequate comfort level or baseline comfort level  3/7/2025 1454 by Jonathon Craig RN  Outcome: Progressing  3/7/2025 0144 by Yoon Taylor RN  Outcome: Progressing     Problem: Safety - Adult  Goal: Free from fall injury  3/7/2025 1454 by Jonathon Craig RN  Outcome: Progressing  3/7/2025 0144 by Yoon Taylor RN  Outcome: Progressing     Problem: Chronic Conditions and Co-morbidities  Goal: Patient's chronic conditions and co-morbidity symptoms are monitored and maintained or improved  3/7/2025 1454 by Jonathon Craig RN  Outcome: Progressing  3/7/2025 0144 by Yoon Talyor RN  Outcome: Progressing  Flowsheets (Taken 3/6/2025 2000)  Care Plan - Patient's Chronic Conditions and Co-Morbidity Symptoms are Monitored and Maintained or Improved:   Monitor and assess patient's chronic conditions and comorbid symptoms for stability, deterioration, or improvement   Collaborate with multidisciplinary team to address chronic and comorbid conditions and prevent exacerbation or deterioration   Update acute care plan with appropriate goals if chronic or comorbid symptoms are exacerbated and prevent overall improvement and discharge     Problem: ABCDS Injury Assessment  Goal: Absence of physical  injury  3/7/2025 1454 by Jonathon Craig RN  Outcome: Progressing  3/7/2025 0144 by Yoon Taylor RN  Outcome: Progressing     Problem: Skin/Tissue Integrity - Adult  Goal: Incisions, wounds, or drain sites healing without S/S of infection  3/7/2025 1454 by Jonathon Craig RN  Outcome: Progressing  3/7/2025 0144 by Yoon Taylor RN  Outcome: Progressing  Flowsheets (Taken 3/6/2025 2000)  Incisions, Wounds, or Drain Sites Healing Without Sign and Symptoms of Infection: TWICE DAILY: Assess and document dressing/incision, wound bed, drain sites and surrounding tissue     Problem: Gastrointestinal - Adult  Goal: Minimal or absence of nausea and vomiting  3/7/2025 1454 by Jonathon Craig RN  Outcome: Progressing  3/7/2025 0144 by Yoon Taylor RN  Outcome: Progressing  Goal: Maintains or returns to baseline bowel function  3/7/2025 1454 by Jonathon Craig RN  Outcome: Progressing  3/7/2025 0144 by Yoon Taylor RN  Outcome: Progressing  Goal: Maintains adequate nutritional intake  3/7/2025 1454 by Jonathon Craig RN  Outcome: Progressing  3/7/2025 0144 by Yoon Taylor RN  Outcome: Progressing     Problem: Genitourinary - Adult  Goal: Absence of urinary retention  3/7/2025 1454 by Jonathon Craig RN  Outcome: Progressing  3/7/2025 0144 by Yoon Taylor RN  Outcome: Progressing  Goal: Urinary catheter remains patent  3/7/2025 1454 by Jonathon Craig RN  Outcome: Progressing  3/7/2025 0144 by Yoon Taylor RN  Outcome: Progressing     Problem: Nutrition Deficit:  Goal: Optimize nutritional status  3/7/2025 1454 by Jonathon Craig RN  Outcome: Progressing  3/7/2025 0144 by Yoon Taylor RN  Outcome: Progressing

## 2025-03-07 NOTE — PLAN OF CARE
Problem: Discharge Planning  Goal: Discharge to home or other facility with appropriate resources  3/7/2025 0144 by Yoon Taylor RN  Outcome: Progressing  Flowsheets (Taken 3/6/2025 2000)  Discharge to home or other facility with appropriate resources:   Identify barriers to discharge with patient and caregiver   Arrange for needed discharge resources and transportation as appropriate   Identify discharge learning needs (meds, wound care, etc)   Refer to discharge planning if patient needs post-hospital services based on physician order or complex needs related to functional status, cognitive ability or social support system     Problem: Pain  Goal: Verbalizes/displays adequate comfort level or baseline comfort level  3/7/2025 0144 by Yoon Taylor RN  Outcome: Progressing     Problem: Safety - Adult  Goal: Free from fall injury  3/7/2025 0144 by Yoon Taylor RN  Outcome: Progressing     Problem: Chronic Conditions and Co-morbidities  Goal: Patient's chronic conditions and co-morbidity symptoms are monitored and maintained or improved  3/7/2025 0144 by Yoon Taylor RN  Outcome: Progressing  Flowsheets (Taken 3/6/2025 2000)  Care Plan - Patient's Chronic Conditions and Co-Morbidity Symptoms are Monitored and Maintained or Improved:   Monitor and assess patient's chronic conditions and comorbid symptoms for stability, deterioration, or improvement   Collaborate with multidisciplinary team to address chronic and comorbid conditions and prevent exacerbation or deterioration   Update acute care plan with appropriate goals if chronic or comorbid symptoms are exacerbated and prevent overall improvement and discharge     Problem: Skin/Tissue Integrity - Adult  Goal: Incisions, wounds, or drain sites healing without S/S of infection  3/7/2025 0144 by Yoon Taylor RN  Outcome: Progressing  Flowsheets (Taken 3/6/2025 2000)  Incisions, Wounds, or Drain Sites Healing Without Sign and Symptoms of Infection:  TWICE DAILY: Assess and document dressing/incision, wound bed, drain sites and surrounding tissue     Problem: Gastrointestinal - Adult  Goal: Minimal or absence of nausea and vomiting  3/7/2025 0144 by Yoon Taylor RN  Outcome: Progressing     Problem: Gastrointestinal - Adult  Goal: Maintains or returns to baseline bowel function  3/7/2025 0144 by Yoon Taylor RN  Outcome: Progressing     Problem: Gastrointestinal - Adult  Goal: Maintains adequate nutritional intake  3/7/2025 0144 by Yoon Taylor RN  Outcome: Progressing     Problem: Nutrition Deficit:  Goal: Optimize nutritional status  3/7/2025 0144 by Yoon Taylor RN  Outcome: Progressing

## 2025-03-07 NOTE — PROGRESS NOTES
General Surgery Progress Note            PATIENT NAME: Ness Castañeda     TODAY'S DATE: 3/7/2025, 5:24 AM    SUBJECTIVE:    Patient seen and examined at the bedside.  No acute events reported overnight.  Slightly hypertensive but vitals have otherwise remained within normal limits.  Patient reports that she continues to have bowel movements, continues to pass flatus.  Pain is well-controlled.  Patient reports that she is feeling slightly better this morning.  Denies any nausea or vomiting.  She has had 230 cc in the past 24 hours of serous output in her SOFÍA drain.  NG tube does remain in place however has been clamped since yesterday.    OBJECTIVE:   VITALS:  BP (!) 143/64   Pulse 80   Temp 98.6 °F (37 °C) (Axillary)   Resp 16   Ht 1.651 m (5' 5\")   Wt 79.4 kg (175 lb)   LMP 02/24/1998   SpO2 97%   BMI 29.12 kg/m²      INTAKE/OUTPUT:      Intake/Output Summary (Last 24 hours) at 3/7/2025 0524  Last data filed at 3/7/2025 0308  Gross per 24 hour   Intake 660 ml   Output 1030 ml   Net -370 ml       PHYSICAL EXAM  GEN: Alert, awake, oriented, NAD  HEENT: normocephalic, no scleral icterus, moist mucous membranes  CV: Regular rate and rhythm  LUNG: Unlabored breathing on RA  ABDOMEN: Soft, softly distended, mildly tender to palpation near incisions.  No rebound, guarding, or rigidity.  Incisions are clean and dry with staples intact.   No active bleeding noted. SOFÍA intact with serous output.  EXTREMITIES: No edema, cyanosis or clubbing    Data:  CBC with Differential:    Lab Results   Component Value Date/Time    WBC 16.6 03/06/2025 05:49 AM    RBC 2.88 03/06/2025 05:49 AM    HGB 7.6 03/06/2025 05:49 AM    HCT 25.4 03/06/2025 05:49 AM     03/06/2025 05:49 AM    MCV 88.2 03/06/2025 05:49 AM    MCH 26.4 03/06/2025 05:49 AM    MCHC 29.9 03/06/2025 05:49 AM    RDW 18.6 03/06/2025 05:49 AM    LYMPHOPCT 7 03/05/2025 05:47 AM    MONOPCT 6 03/05/2025 05:47 AM    EOSPCT 2 03/05/2025 05:47 AM    BASOPCT 0 03/05/2025  05:47 AM    MONOSABS 0.93 03/05/2025 05:47 AM    LYMPHSABS 1.17 03/05/2025 05:47 AM    EOSABS 0.38 03/05/2025 05:47 AM    BASOSABS 0.04 03/05/2025 05:47 AM    DIFFTYPE NOT REPORTED 11/08/2016 11:05 AM     BMP:    Lab Results   Component Value Date/Time     03/06/2025 05:49 AM    K 3.8 03/06/2025 05:49 AM     03/06/2025 05:49 AM    CO2 24 03/06/2025 05:49 AM    BUN 13 03/06/2025 05:49 AM    CREATININE 1.4 03/06/2025 05:49 AM    CALCIUM 9.0 03/06/2025 05:49 AM    GFRAA >60 10/19/2018 08:34 AM    LABGLOM 40 03/06/2025 05:49 AM    LABGLOM 39 07/24/2023 10:14 AM    GLUCOSE 152 03/06/2025 05:49 AM       Radiology Review:    No results found.    CT ABDOMEN PELVIS WO CONTRAST Additional Contrast? Oral (water soluble through NGT)   Final Result   1. Status post sigmoid colectomy with fat stranding adjacent the sigmoid   colon which could be postoperative or related to mild colitis. No free air or   focal fluid collection is identified.   2. Dilated proximal small bowel loops without an abrupt transition in   caliber.  Ileus is favored over some degree of bowel obstruction.   3. Cholelithiasis.   4. Trace bilateral pleural effusions with adjacent airspace opacities in the   lower lobes which could represent atelectasis or pneumonia.   5. 1.2 cm right adrenal adenoma.  No follow-up imaging is recommended.   6. Air in the bladder which could be related to recent instrumentation or   cystitis.         XR ABDOMEN FOR NG/OG/NE TUBE PLACEMENT   Final Result   1. Nasogastric tube terminating in the gastric body.         XR CHEST PORTABLE   Final Result   No acute airspace disease identified.         XR ABDOMEN (KUB) (SINGLE AP VIEW)   Final Result   Mildly dilated loops of small bowel in the upper abdomen could represent   partial small bowel obstruction versus ileus         XR ABDOMEN FOR NG/OG/NE TUBE PLACEMENT   Final Result   Enteric tube with the tip and side-port in the stomach.         XR ABDOMEN (KUB) (SINGLE AP

## 2025-03-07 NOTE — PROGRESS NOTES
Physical Therapy  Facility/Department: [facility]  Daily Treatment Note  NAME: Ness Castañeda  : 1950  MRN: [id number]    Date of Service: 3/7/2025    Discharge Recommendations:  Patient would benefit from continued therapy after discharge    Due to recent hospitalization and medical condition, pt would benefit from additional intermittent skilled therapy at time of discharge.  Please refer to the AM-PAC score for current functional status.      Patient Diagnosis(es): The encounter diagnosis was Diverticulosis.    Assessment  Assessment: Patient able to complete seated HEP and then writer returned from toilet transfer and ambulation in the hallway. Patient noted to have forward posture and very limited aerobic capacity. Required 7-8  standing rest breaks from 10-30 seconds and 1 seated rest breaks x 5 mins but able to complete 250' x 1. VCs for upright posture but limited by endurance. Patient progressing toward STGs but limtied by endurance and activity tolerance.  Activity Tolerance: Patient limited by endurance;Patient tolerated treatment well    Plan  Physical Therapy Plan  General Plan: 1 time a day 7 days a week  Current Treatment Recommendations: Strengthening;Balance training;Functional mobility training;Transfer training;Endurance training;Gait training;Stair training;Neuromuscular re-education;Home exercise program;Safety education & training;Patient/Caregiver education & training;Equipment evaluation, education, & procurement;Therapeutic activities    Restrictions  Restrictions/Precautions  Restrictions/Precautions: Fall Risk, General Precautions  Activity Level: Up with Assist  Position Activity Restriction  Other Position/Activity Restrictions: RUE IV, abd binder as needed when up, alarms, SOFÍA drain, NPO with ice chips/NGT, up with assist     Subjective   Subjective  Subjective: Patient resting in the recliner and agreeable for PT treatment. URIAH Holt reported patient agreeable for PT

## 2025-03-07 NOTE — PROGRESS NOTES
differently: Take 1 capsule by mouth nightly) 40 capsule 1    eszopiclone (LUNESTA) 2 MG TABS Take 1 tablet by mouth nightly as needed.      Melatonin 10 MG TABS Take 5 mg by mouth nightly      clobetasol (TEMOVATE) 0.05 % cream Apply topically      metFORMIN (GLUCOPHAGE-XR) 500 MG extended release tablet Take 1 tablet by mouth daily lunch      naftifine (NAFTIN) 1 % cream Apply topically      oxyCODONE-acetaminophen (PERCOCET) 5-325 MG per tablet Take 1 tablet by mouth every 6 hours as needed for Pain . (Patient not taking: Reported on 2/26/2025) 20 tablet 0    metFORMIN (GLUCOPHAGE) 500 MG tablet Take 1 tablet by mouth Daily with supper      Cholecalciferol (VITAMIN D-3 PO) Take 2,000 Units by mouth nightly          Allergies:    Patient has no known allergies.    Social History:    reports that she has never smoked. She has never used smokeless tobacco. She reports that she does not drink alcohol and does not use drugs.    Family History:       Problem Relation Age of Onset    Breast Cancer Mother     Cancer Father         BLADDER    Heart Disease Father         ENLARGED HEART    Breast Cancer Sister     COPD Brother     Cancer Maternal Grandmother         UTERINE    Other Maternal Grandfather         SEPSIS    Cancer Brother         PANCREATIC       Diet:  PN-Adult 2-in-1 Central Line (Standard)  ADULT DIET; Full Liquid  PN-Adult 2-in-1 Central Line (Standard)    Review of systems:   Pertinent positives as noted in the HPI. All other systems reviewed and negative.    PHYSICAL EXAM:  BP (!) 157/64   Pulse 80   Temp 97.5 °F (36.4 °C) (Oral)   Resp 17   Ht 1.651 m (5' 5\")   Wt 79.4 kg (175 lb)   LMP 02/24/1998   SpO2 99%   BMI 29.12 kg/m²   General appearance: No apparent distress, appears stated age and cooperative.  HEENT: Normal cephalic, atraumatic without obvious deformity. Pupils equal, round, and reactive to light.  Extra ocular muscles intact. Conjunctivae/corneas clear.  Neck: Supple, with full  Cholelithiasis.   4. Trace bilateral pleural effusions with adjacent airspace opacities in the   lower lobes which could represent atelectasis or pneumonia.   5. 1.2 cm right adrenal adenoma.  No follow-up imaging is recommended.   6. Air in the bladder which could be related to recent instrumentation or   cystitis.         XR ABDOMEN FOR NG/OG/NE TUBE PLACEMENT   Final Result   1. Nasogastric tube terminating in the gastric body.         XR CHEST PORTABLE   Final Result   No acute airspace disease identified.         XR ABDOMEN (KUB) (SINGLE AP VIEW)   Final Result   Mildly dilated loops of small bowel in the upper abdomen could represent   partial small bowel obstruction versus ileus         XR ABDOMEN FOR NG/OG/NE TUBE PLACEMENT   Final Result   Enteric tube with the tip and side-port in the stomach.         XR ABDOMEN (KUB) (SINGLE AP VIEW)   Final Result   1. Gas-filled mildly distended large and small bowel loops in the mid   abdomen. No gas in the rectum. Findings most likely represent postoperative   ileus.  Suggest follow-up KUB as per clinical.   2. Rounded soft tissue density right transverse colon, possibly a polyp or   other soft tissue density.           No results found.      EKG:     Electronically signed by Norma Rios MD on 3/7/2025 at 3:22 PM

## 2025-03-07 NOTE — PROGRESS NOTES
Nutrition Assessment     Type and Reason for Visit: Reassess    Nutrition Recommendations/Plan:   Diet NPO Exceptions are: Ice Chips  Continue central standard TPN to 60 mL/hr (1440 mL total volume) and 100 mL of lipids  Monitor p.o intakes, TPN rate, tolerance, GI function, diet advancement and labs     Malnutrition Assessment:  Malnutrition Status: At risk for malnutrition    Nutrition Assessment:  Patient was started on a clear liquid diet for breakfast today which was tolerated. Diet was advanced to Full liquid for lunch and patient reports having chocolate pudding and jello this afternoon with no ill effects. NG tube has been clamped since yesterday and could be discontinued soon if patient continues to tolerate oral intakes. Once patient tolerates solid food TPN can be weaned off. At this time central standard TPN will continue at 60 mL/hr (1440 mL total volume) and 100 mL of lipids. Monitor p.o intakes, TPN rate, tolerance, GI function, diet advancement and labs    Estimated Daily Nutrient Needs:  Energy (kcal):  4548-2015 kcal (20-25 kcal/kg) Weight Used for Energy Requirements: Admission     Protein (g):  74-91 gm of protein (1.3-1.6 gm/kg) Weight Used for Protein Requirements: Ideal        Fluid (ml/day):  3353-3270 mL Method Used for Fluid Requirements: 1 ml/kcal    Nutrition Related Findings:   Edema: trace BLE. Active bowel sounds, flatus. NG tube clamping trial (3/6-3/7). S/P Probotic assisted open sigmoid resection (2/26) Wound Type: Surgical Incision    Current Nutrition Therapies:    PN-Adult 2-in-1 Central Line (Standard)  ADULT DIET; Full Liquid  PN-Adult 2-in-1 Central Line (Standard)    Anthropometric Measures:  Height: 165.1 cm (5' 5\")  Current Body Wt: 79.4 kg (175 lb 0.7 oz)   BMI: 29.1        Nutrition Diagnosis:   Inadequate oral intake related to inadequate protein-energy intake, altered GI function as evidenced by s/p surgery, GI abnormality, nutrition support - parenteral nutrition,  intake 0-25%    Nutrition Interventions:   Food and/or Nutrient Delivery: Continue Current Diet, Continue Current Parenteral Nutrition  Nutrition Education/Counseling: Education/Counseling not indicated  Coordination of Nutrition Care: Continue to monitor while inpatient       Goals:  Goals: PO intake 50% or greater, Meet at least 75% of estimated needs, Tolerate nutrition support at goal rate  Type of Goal: Continue current goal  Previous Goal Met: Progressing toward Goal(s)    Nutrition Monitoring and Evaluation:      Food/Nutrient Intake Outcomes: Parenteral Nutrition Intake/Tolerance, Food and Nutrient Intake, Diet Advancement/Tolerance  Physical Signs/Symptoms Outcomes: Biochemical Data, Nausea or Vomiting, Skin, Fluid Status or Edema, Weight, GI Status    Discharge Planning:    Too soon to determine       Nadja LAMBN, RDN, LDN  Lead Clinical Dietitian  RD Office Phone [phone]

## 2025-03-08 LAB
ANION GAP SERPL CALCULATED.3IONS-SCNC: 10 MMOL/L (ref 9–16)
BUN SERPL-MCNC: 16 MG/DL (ref 8–23)
CALCIUM SERPL-MCNC: 8.9 MG/DL (ref 8.8–10.2)
CHLORIDE SERPL-SCNC: 109 MMOL/L (ref 98–107)
CO2 SERPL-SCNC: 23 MMOL/L (ref 20–31)
CREAT SERPL-MCNC: 1.2 MG/DL (ref 0.5–0.9)
ERYTHROCYTE [DISTWIDTH] IN BLOOD BY AUTOMATED COUNT: 18.9 % (ref 11.8–14.4)
GFR, ESTIMATED: 48 ML/MIN/1.73M2
GLUCOSE BLD-MCNC: 145 MG/DL (ref 65–105)
GLUCOSE BLD-MCNC: 149 MG/DL (ref 65–105)
GLUCOSE BLD-MCNC: 150 MG/DL (ref 65–105)
GLUCOSE SERPL-MCNC: 123 MG/DL (ref 82–115)
HCT VFR BLD AUTO: 25.1 % (ref 36.3–47.1)
HGB BLD-MCNC: 7.3 G/DL (ref 11.9–15.1)
MAGNESIUM SERPL-MCNC: 1.9 MG/DL (ref 1.6–2.4)
MCH RBC QN AUTO: 26.2 PG (ref 25.2–33.5)
MCHC RBC AUTO-ENTMCNC: 29.1 G/DL (ref 28.4–34.8)
MCV RBC AUTO: 90 FL (ref 82.6–102.9)
NRBC BLD-RTO: 0.2 PER 100 WBC
PHOSPHATE SERPL-MCNC: 2.9 MG/DL (ref 2.5–4.5)
PLATELET # BLD AUTO: 417 K/UL (ref 138–453)
PMV BLD AUTO: 9.6 FL (ref 8.1–13.5)
POTASSIUM SERPL-SCNC: 3.5 MMOL/L (ref 3.7–5.3)
RBC # BLD AUTO: 2.79 M/UL (ref 3.95–5.11)
SODIUM SERPL-SCNC: 142 MMOL/L (ref 136–145)
WBC OTHER # BLD: 11.8 K/UL (ref 3.5–11.3)

## 2025-03-08 PROCEDURE — 85027 COMPLETE CBC AUTOMATED: CPT

## 2025-03-08 PROCEDURE — 6370000000 HC RX 637 (ALT 250 FOR IP): Performed by: SURGERY

## 2025-03-08 PROCEDURE — 6360000002 HC RX W HCPCS

## 2025-03-08 PROCEDURE — 2580000003 HC RX 258

## 2025-03-08 PROCEDURE — 80048 BASIC METABOLIC PNL TOTAL CA: CPT

## 2025-03-08 PROCEDURE — 36415 COLL VENOUS BLD VENIPUNCTURE: CPT

## 2025-03-08 PROCEDURE — 97116 GAIT TRAINING THERAPY: CPT

## 2025-03-08 PROCEDURE — 2500000003 HC RX 250 WO HCPCS: Performed by: FAMILY MEDICINE

## 2025-03-08 PROCEDURE — 1200000000 HC SEMI PRIVATE

## 2025-03-08 PROCEDURE — 2500000003 HC RX 250 WO HCPCS: Performed by: STUDENT IN AN ORGANIZED HEALTH CARE EDUCATION/TRAINING PROGRAM

## 2025-03-08 PROCEDURE — 97530 THERAPEUTIC ACTIVITIES: CPT | Performed by: NURSE PRACTITIONER

## 2025-03-08 PROCEDURE — 84100 ASSAY OF PHOSPHORUS: CPT

## 2025-03-08 PROCEDURE — 6370000000 HC RX 637 (ALT 250 FOR IP): Performed by: STUDENT IN AN ORGANIZED HEALTH CARE EDUCATION/TRAINING PROGRAM

## 2025-03-08 PROCEDURE — 6360000002 HC RX W HCPCS: Performed by: STUDENT IN AN ORGANIZED HEALTH CARE EDUCATION/TRAINING PROGRAM

## 2025-03-08 PROCEDURE — 2580000003 HC RX 258: Performed by: STUDENT IN AN ORGANIZED HEALTH CARE EDUCATION/TRAINING PROGRAM

## 2025-03-08 PROCEDURE — 82947 ASSAY GLUCOSE BLOOD QUANT: CPT

## 2025-03-08 PROCEDURE — 83735 ASSAY OF MAGNESIUM: CPT

## 2025-03-08 PROCEDURE — 6360000002 HC RX W HCPCS: Performed by: HOSPITALIST

## 2025-03-08 PROCEDURE — 6360000002 HC RX W HCPCS: Performed by: SURGERY

## 2025-03-08 PROCEDURE — 97535 SELF CARE MNGMENT TRAINING: CPT | Performed by: NURSE PRACTITIONER

## 2025-03-08 RX ORDER — FUROSEMIDE 10 MG/ML
20 INJECTION INTRAMUSCULAR; INTRAVENOUS ONCE
Status: COMPLETED | OUTPATIENT
Start: 2025-03-08 | End: 2025-03-08

## 2025-03-08 RX ORDER — METOCLOPRAMIDE HYDROCHLORIDE 5 MG/ML
10 INJECTION INTRAMUSCULAR; INTRAVENOUS EVERY 6 HOURS
Status: DISCONTINUED | OUTPATIENT
Start: 2025-03-08 | End: 2025-03-09

## 2025-03-08 RX ADMIN — POTASSIUM BICARBONATE 40 MEQ: 782 TABLET, EFFERVESCENT ORAL at 12:05

## 2025-03-08 RX ADMIN — ONDANSETRON 4 MG: 2 INJECTION, SOLUTION INTRAMUSCULAR; INTRAVENOUS at 21:19

## 2025-03-08 RX ADMIN — METHOCARBAMOL 500 MG: 500 TABLET ORAL at 08:03

## 2025-03-08 RX ADMIN — CALCIUM GLUCONATE: 98 INJECTION INTRAVENOUS at 17:50

## 2025-03-08 RX ADMIN — ENOXAPARIN SODIUM 40 MG: 100 INJECTION SUBCUTANEOUS at 08:03

## 2025-03-08 RX ADMIN — PIPERACILLIN AND TAZOBACTAM 3375 MG: 3; .375 INJECTION, POWDER, LYOPHILIZED, FOR SOLUTION INTRAVENOUS at 14:33

## 2025-03-08 RX ADMIN — ONDANSETRON 4 MG: 2 INJECTION, SOLUTION INTRAMUSCULAR; INTRAVENOUS at 16:53

## 2025-03-08 RX ADMIN — I.V. FAT EMULSION 100 ML: 20 EMULSION INTRAVENOUS at 17:53

## 2025-03-08 RX ADMIN — FAMOTIDINE 20 MG: 20 TABLET, FILM COATED ORAL at 08:03

## 2025-03-08 RX ADMIN — SODIUM CHLORIDE, PRESERVATIVE FREE 10 ML: 5 INJECTION INTRAVENOUS at 08:07

## 2025-03-08 RX ADMIN — METHOCARBAMOL 500 MG: 500 TABLET ORAL at 14:32

## 2025-03-08 RX ADMIN — METHOCARBAMOL 500 MG: 500 TABLET ORAL at 21:45

## 2025-03-08 RX ADMIN — PIPERACILLIN AND TAZOBACTAM 3375 MG: 3; .375 INJECTION, POWDER, LYOPHILIZED, FOR SOLUTION INTRAVENOUS at 05:24

## 2025-03-08 RX ADMIN — ONDANSETRON 4 MG: 2 INJECTION, SOLUTION INTRAMUSCULAR; INTRAVENOUS at 11:58

## 2025-03-08 RX ADMIN — PIPERACILLIN AND TAZOBACTAM 3375 MG: 3; .375 INJECTION, POWDER, LYOPHILIZED, FOR SOLUTION INTRAVENOUS at 21:23

## 2025-03-08 RX ADMIN — AMLODIPINE BESYLATE 5 MG: 5 TABLET ORAL at 21:45

## 2025-03-08 RX ADMIN — ACETAMINOPHEN 1000 MG: 500 TABLET ORAL at 05:19

## 2025-03-08 RX ADMIN — METOCLOPRAMIDE HYDROCHLORIDE 10 MG: 5 INJECTION INTRAMUSCULAR; INTRAVENOUS at 23:36

## 2025-03-08 RX ADMIN — FUROSEMIDE 20 MG: 10 INJECTION, SOLUTION INTRAMUSCULAR; INTRAVENOUS at 16:53

## 2025-03-08 RX ADMIN — LISINOPRIL 10 MG: 10 TABLET ORAL at 08:03

## 2025-03-08 RX ADMIN — SODIUM CHLORIDE, POTASSIUM CHLORIDE, SODIUM LACTATE AND CALCIUM CHLORIDE: 600; 310; 30; 20 INJECTION, SOLUTION INTRAVENOUS at 16:42

## 2025-03-08 RX ADMIN — METOCLOPRAMIDE HYDROCHLORIDE 10 MG: 5 INJECTION INTRAMUSCULAR; INTRAVENOUS at 17:43

## 2025-03-08 ASSESSMENT — PAIN DESCRIPTION - DESCRIPTORS: DESCRIPTORS: ACHING

## 2025-03-08 ASSESSMENT — PAIN DESCRIPTION - ORIENTATION: ORIENTATION: LEFT

## 2025-03-08 ASSESSMENT — PAIN SCALES - GENERAL: PAINLEVEL_OUTOF10: 6

## 2025-03-08 ASSESSMENT — PAIN DESCRIPTION - LOCATION: LOCATION: ABDOMEN

## 2025-03-08 NOTE — PLAN OF CARE
Problem: Discharge Planning  Goal: Discharge to home or other facility with appropriate resources  Outcome: Progressing  Flowsheets (Taken 3/8/2025 0820)  Discharge to home or other facility with appropriate resources: Identify discharge learning needs (meds, wound care, etc)     Problem: Pain  Goal: Verbalizes/displays adequate comfort level or baseline comfort level  Outcome: Progressing     Problem: Safety - Adult  Goal: Free from fall injury  Outcome: Progressing     Problem: Chronic Conditions and Co-morbidities  Goal: Patient's chronic conditions and co-morbidity symptoms are monitored and maintained or improved  Outcome: Progressing  Flowsheets (Taken 3/8/2025 0820)  Care Plan - Patient's Chronic Conditions and Co-Morbidity Symptoms are Monitored and Maintained or Improved: Monitor and assess patient's chronic conditions and comorbid symptoms for stability, deterioration, or improvement     Problem: ABCDS Injury Assessment  Goal: Absence of physical injury  Outcome: Progressing     Problem: Skin/Tissue Integrity - Adult  Goal: Incisions, wounds, or drain sites healing without S/S of infection  Outcome: Progressing  Flowsheets (Taken 3/8/2025 0820)  Incisions, Wounds, or Drain Sites Healing Without Sign and Symptoms of Infection: ADMISSION and DAILY: Assess and document risk factors for pressure ulcer development     Problem: Gastrointestinal - Adult  Goal: Minimal or absence of nausea and vomiting  Outcome: Progressing

## 2025-03-08 NOTE — PROGRESS NOTES
Physical Therapy  Facility/Department: [facility] SURG      NAME: Ness Castañeda  : 1950 (74 y.o.)  MRN: [id number]  CODE STATUS: Full Code    Date of Service: 3/8/25      Past Medical History:   Diagnosis Date    Arthritis     Bladder tumor 2016    Blood in urine 2016    Cancer (HCC) 2016    BLADDER-REMOVED NO CHEMO OR RADIATION    Cancer (HCC) 2016    RT KIDNEY-PARTIAL REMOVAL NO CHEMO OR RADIATION    Dental crowns present     UPPER AND LOWER    Diabetes mellitus (HCC)     ON ORAL MED    Diverticulitis     Hypertension     ON RX STARTING 2017    Irregular heart beat     pt evaluated by cardiology 2025 - cleared for surgery    Kidney disease     stage 3 per pt    Prediabetes     Skin cancer     nose    Wears glasses      Past Surgical History:   Procedure Laterality Date    BLADDER TUMOR EXCISION  2017    TUR-BT    COLONOSCOPY      DILATION AND CURETTAGE OF UTERUS      ESOPHAGOGASTRODUODENOSCOPY      PARTIAL NEPHRECTOMY Right 2017    radical neph    PARTIAL NEPHRECTOMY Right 2017    NEPHRECTOMY PARTIAL, CONVERTED TO RADICAL SI ROBOTIC, INTRAOP ULTRASOUND  performed by Agustin Garrido MD at [facility] OR    SIGMOID COLECTOMY N/A 2025    LEFT LAPAROSCOPIC ROBOTIC XI ASSITED OPEN SIGMOID RESECTION WITH TAP BLOCK performed by Fracisco Barksdale MD at [facility] OR    SKIN BIOPSY      SKIN CANCER EXCISION      nose    TONSILLECTOMY      T&A       Chart Reviewed: Yes  Family/Caregiver Present: No  General  General Comments: RN \"Terria\" ok'd PT    Restrictions:  Restrictions/Precautions: Fall Risk;General Precautions  Position Activity Restriction  Other Position/Activity Restrictions: RUE IV, abd binder as needed when up, alarms, SOFÍA drain,  up with assist     SUBJECTIVE  Subjective: Patient awake in chair upon arrival and agreeable to PT; pt pleasant and cooperative t/o      OBJECTIVE    Functional Mobility  Transfers  Sit to Stand: Stand by assistance  Stand to Sit: Stand  Plan  General Plan: 1 time a day 7 days a week  Specific Instructions for Next Treatment: Endurance  Current Treatment Recommendations: Strengthening;Balance training;Functional mobility training;Transfer training;Endurance training;Gait training;Stair training;Neuromuscular re-education;Home exercise program;Safety education & training;Patient/Caregiver education & training;Equipment evaluation, education, & procurement;Therapeutic activities  Safety Devices  Type of Devices: All fall risk precautions in place;Patient at risk for falls;Nurse notified;Gait belt;Call light within reach;Left in chair;Chair alarm in place    EDUCATION  Pt educated on AD safety, gait and LE HEP    ELOS:          Therapy Time   Individual Concurrent Group Co-treatment   Time In 1109         Time Out 1126         Minutes 17                   Rosmery Darby PTA, 03/08/25 at 11:34 AM

## 2025-03-08 NOTE — PROGRESS NOTES
Patient has had X1 bout of vomitting zofran given, writer notified Dr. Barksdale new orders given.

## 2025-03-08 NOTE — PROGRESS NOTES
General Surgery Progress Note            PATIENT NAME: Ness Castañeda     TODAY'S DATE: 3/8/2025, 6:58 AM    SUBJECTIVE:    Patient seen and examined at the bedside.  No acute events reported overnight.  Patient had multiple bowel movements yesterday.  Tolerated FLD around her NGT without nausea or emesis.  Overall feels much better.  Ambulated with PT yesterday and walked 250 feet with multiple breaks.  Leukocytosis nearly resolved.  Remains VSS, afebrile.    OBJECTIVE:   VITALS:  BP (!) 151/69   Pulse 82   Temp 97.7 °F (36.5 °C) (Oral)   Resp 16   Ht 1.651 m (5' 5\")   Wt 79.7 kg (175 lb 12.8 oz)   LMP 02/24/1998   SpO2 98%   BMI 29.25 kg/m²      INTAKE/OUTPUT:      Intake/Output Summary (Last 24 hours) at 3/8/2025 0658  Last data filed at 3/8/2025 0514  Gross per 24 hour   Intake --   Output 2670 ml   Net -2670 ml       PHYSICAL EXAM  GEN: Alert, awake, oriented, NAD  HEENT: normocephalic, no scleral icterus, moist mucous membranes  CV: Regular rate and rhythm  LUNG: Unlabored breathing on RA  ABDOMEN: Soft, softly distended, mildly tender to palpation near incisions.  No rebound, guarding, or rigidity.  Incisions are clean and dry with staples intact.   No active bleeding noted. SOFÍA intact with serous output.  EXTREMITIES: No edema, cyanosis or clubbing    Data:  CBC with Differential:    Lab Results   Component Value Date/Time    WBC 11.8 03/08/2025 05:35 AM    RBC 2.79 03/08/2025 05:35 AM    HGB 7.3 03/08/2025 05:35 AM    HCT 25.1 03/08/2025 05:35 AM     03/08/2025 05:35 AM    MCV 90.0 03/08/2025 05:35 AM    MCH 26.2 03/08/2025 05:35 AM    MCHC 29.1 03/08/2025 05:35 AM    RDW 18.9 03/08/2025 05:35 AM    LYMPHOPCT 7 03/05/2025 05:47 AM    MONOPCT 6 03/05/2025 05:47 AM    EOSPCT 2 03/05/2025 05:47 AM    BASOPCT 0 03/05/2025 05:47 AM    MONOSABS 0.93 03/05/2025 05:47 AM    LYMPHSABS 1.17 03/05/2025 05:47 AM    EOSABS 0.38 03/05/2025 05:47 AM    BASOSABS 0.04 03/05/2025 05:47 AM    DIFFTYPE NOT  Cerebral Edema/Brain Compression / Herniation   ileus.  Suggest follow-up KUB as per clinical.   2. Rounded soft tissue density right transverse colon, possibly a polyp or   other soft tissue density.             ASSESSMENT   74 year old female s/p robotic assisted open sigmoid resection 2/26/2025  Pathology: Diverticulosis, abscess cavity with associated dense adhesions and inflammation    Plan  Patient seen and examined, improved exam  Diet: Advance to low fiber regular diet.  Continue TPN until tolerating good volume of p.o. diet  Remove NGT  Antiemetics: As needed  Leukocytosis: Improved to 11.8 (16.6, 16.0, 20.7)  Continue to monitor bowel function.   Strict I's and O's  Continue IV antibiotics, Zosyn  Continue daily wound care.  RN order placed  Encourage incentive spirometer use  Encourage OOB and ambulation, work with PT/OT  Dispo: Advance to regular diet, remove NGT, wean TPN when tolerating good volume, anticipate discharge in the next 24-48 hours.      CHERYL Morris DO - PGY5  Surgery Department    Attending Physician Statement  I have discussed the case, including pertinent history and exam findings with the resident. I agree with the assessment, plan and orders as documented by the resident.     Advance diet  Improving

## 2025-03-08 NOTE — PROGRESS NOTES
Occupational Therapy  [facility]  Occupational Therapy Not Seen    DATE: 3/8/2025    NAME: Ness Castañeda  MRN: [id number]   : 1950    Patient not seen this date for Occupational Therapy due to:      [] Cancel by RN or physician due to: RN and pt wanting pt to eat first, hgb 7.3; will check back later this date          CLARIBEL Vaughan

## 2025-03-08 NOTE — PROGRESS NOTES
Nutrition Assessment     Type and Reason for Visit: Nutrition support, Reassess    Nutrition Recommendations/Plan:   ADULT DIET; Regular; Low Fiber  Modify TPN to central custom TPN at 60 mL/hr (1440 mL total volume) and 100 mL of lipids  Monitor p.o intakes, diet tolerance, TPN rate, GI function, and labs     Malnutrition Assessment:  Malnutrition Status: At risk for malnutrition    Nutrition Assessment:  Patient tolerated the Full liquid diet and NG tube was discontinued this morning. Diet has been advanced to Regular; Low Fiber but patient is not able to eat much. TPN will be modified to central custom at 60 mL/hr (1440 mL total volume) and 100 mL of lipids. Monitor p.o intakes, diet tolerance, GI function, TPN rate and labs.    Estimated Daily Nutrient Needs:  Energy (kcal):  3174-2587 kcal (20-25 kcal/kg) Weight Used for Energy Requirements: Admission     Protein (g):  74-91 gm of protein (1.3-1.6 gm/kg) Weight Used for Protein Requirements: Ideal        Fluid (ml/day):  0013-9672 mL Method Used for Fluid Requirements: 1 ml/kcal    Nutrition Related Findings:   Edema: trace BLE. Active bowel sounds. NG tube discontinued (3/8). S/P Robotic assisted open sigmoid resection (2/26) Wound Type: Surgical Incision    Current Nutrition Therapies:    PN-Adult 2-in-1 Central Line (Standard)  ADULT DIET; Regular; Low Fiber  PN-Adult 2-in-1 Central Line (Custom)    Anthropometric Measures:  Height: 165.1 cm (5' 5\")  Current Body Wt: 79.4 kg (175 lb 0.7 oz)   BMI: 29.1        Nutrition Diagnosis:   Inadequate oral intake related to inadequate protein-energy intake, altered GI function as evidenced by s/p surgery, GI abnormality, nutrition support - parenteral nutrition, intake 0-25%    Nutrition Interventions:   Food and/or Nutrient Delivery: Continue current diet, Modify Parenteral Nutrition  Nutrition Education/Counseling: Education/Counseling not indicated  Coordination of Nutrition Care: Continue to monitor while  inpatient       Goals:  Goals: PO intake 50% or greater, Meet at least 75% of estimated needs, Tolerate nutrition support at goal rate  Type of Goal: Continue current goal  Previous Goal Met: Progressing toward Goal(s)    Nutrition Monitoring and Evaluation:      Food/Nutrient Intake Outcomes: Parenteral Nutrition Intake/Tolerance, Food and Nutrient Intake, Diet Advancement/Tolerance  Physical Signs/Symptoms Outcomes: Biochemical Data, Nausea or Vomiting, Skin, Fluid Status or Edema, Weight, GI Status    Discharge Planning:    Too soon to determine       Nadja LAMBN, RDN, LDN  Lead Clinical Dietitian  RD Office Phone [phone]

## 2025-03-08 NOTE — PLAN OF CARE
Problem: Discharge Planning  Goal: Discharge to home or other facility with appropriate resources  3/8/2025 0208 by Sandy Camilo RN  Outcome: Progressing  Flowsheets (Taken 3/7/2025 1900)  Discharge to home or other facility with appropriate resources:   Identify barriers to discharge with patient and caregiver   Arrange for needed discharge resources and transportation as appropriate   Identify discharge learning needs (meds, wound care, etc)   Refer to discharge planning if patient needs post-hospital services based on physician order or complex needs related to functional status, cognitive ability or social support system     Problem: Pain  Goal: Verbalizes/displays adequate comfort level or baseline comfort level  3/8/2025 0208 by Sandy Camilo RN  Outcome: Progressing     Problem: Safety - Adult  Goal: Free from fall injury  3/8/2025 0208 by Sandy Camilo RN  Outcome: Progressing     Problem: Chronic Conditions and Co-morbidities  Goal: Patient's chronic conditions and co-morbidity symptoms are monitored and maintained or improved  3/8/2025 0208 by Sandy Camilo RN  Outcome: Progressing  Flowsheets (Taken 3/7/2025 1900)  Care Plan - Patient's Chronic Conditions and Co-Morbidity Symptoms are Monitored and Maintained or Improved:   Monitor and assess patient's chronic conditions and comorbid symptoms for stability, deterioration, or improvement   Collaborate with multidisciplinary team to address chronic and comorbid conditions and prevent exacerbation or deterioration   Update acute care plan with appropriate goals if chronic or comorbid symptoms are exacerbated and prevent overall improvement and discharge     Problem: ABCDS Injury Assessment  Goal: Absence of physical injury  3/8/2025 0208 by Sandy Camilo RN  Outcome: Progressing     Problem: Skin/Tissue Integrity - Adult  Goal: Incisions, wounds, or drain sites healing without S/S of infection  3/8/2025 0208 by Sandy Camilo RN  Outcome:  restoring, or maintaining nutritional needs:   Monitor oral intake, labs, and treatment plans   Assess nutritional status and recommend course of action   Recommend, monitor, and adjust tube feedings and TPN/PPN based on assessed needs

## 2025-03-08 NOTE — PROGRESS NOTES
H/H showed hgb 7.1  Consented for blood. Tolerated procedure well.       Past Medical History:        Diagnosis Date    Arthritis     Bladder tumor 12/2016    Blood in urine 12/2016    Cancer (HCC) 12/2016    BLADDER-REMOVED NO CHEMO OR RADIATION    Cancer (HCC) 12/2016    RT KIDNEY-PARTIAL REMOVAL NO CHEMO OR RADIATION    Dental crowns present     UPPER AND LOWER    Diabetes mellitus (HCC) 2011    ON ORAL MED    Diverticulitis     Hypertension 2015    ON RX STARTING 01/2017    Irregular heart beat     pt evaluated by cardiology 2/19/2025 - cleared for surgery    Kidney disease     stage 3 per pt    Prediabetes     Skin cancer     nose    Wears glasses        Past Surgical History:        Procedure Laterality Date    BLADDER TUMOR EXCISION  01/16/2017    TUR-BT    COLONOSCOPY  2006    DILATION AND CURETTAGE OF UTERUS  1995    ESOPHAGOGASTRODUODENOSCOPY      PARTIAL NEPHRECTOMY Right 02/27/2017    radical neph    PARTIAL NEPHRECTOMY Right 02/27/2017    NEPHRECTOMY PARTIAL, CONVERTED TO RADICAL SI ROBOTIC, INTRAOP ULTRASOUND  performed by Agustin Garrido MD at [facility] OR    SIGMOID COLECTOMY N/A 2/26/2025    LEFT LAPAROSCOPIC ROBOTIC XI ASSITED OPEN SIGMOID RESECTION WITH TAP BLOCK performed by Fracisco Barksdale MD at [facility] OR    SKIN BIOPSY      SKIN CANCER EXCISION      nose    TONSILLECTOMY  1954    T&A       Home Medications:   No current facility-administered medications on file prior to encounter.     Current Outpatient Medications on File Prior to Encounter   Medication Sig Dispense Refill    ferrous sulfate (IRON 325) 325 (65 Fe) MG tablet Take 1 tablet by mouth daily (with breakfast)      amLODIPine (NORVASC) 5 MG tablet Take 1 tablet by mouth nightly      docusate sodium (COLACE) 100 MG capsule Take 1 capsule by mouth 2 times daily 14 capsule 0    lisinopril (PRINIVIL;ZESTRIL) 10 MG tablet Take 1 tablet by mouth 2 times daily      docusate sodium (COLACE) 100 MG capsule Take 1 capsule by mouth daily as needed for  Constipation (Patient taking differently: Take 1 capsule by mouth nightly) 40 capsule 1    eszopiclone (LUNESTA) 2 MG TABS Take 1 tablet by mouth nightly as needed.      Melatonin 10 MG TABS Take 5 mg by mouth nightly      clobetasol (TEMOVATE) 0.05 % cream Apply topically      metFORMIN (GLUCOPHAGE-XR) 500 MG extended release tablet Take 1 tablet by mouth daily lunch      naftifine (NAFTIN) 1 % cream Apply topically      oxyCODONE-acetaminophen (PERCOCET) 5-325 MG per tablet Take 1 tablet by mouth every 6 hours as needed for Pain . (Patient not taking: Reported on 2/26/2025) 20 tablet 0    metFORMIN (GLUCOPHAGE) 500 MG tablet Take 1 tablet by mouth Daily with supper      Cholecalciferol (VITAMIN D-3 PO) Take 2,000 Units by mouth nightly          Allergies:    Patient has no known allergies.    Social History:    reports that she has never smoked. She has never used smokeless tobacco. She reports that she does not drink alcohol and does not use drugs.    Family History:       Problem Relation Age of Onset    Breast Cancer Mother     Cancer Father         BLADDER    Heart Disease Father         ENLARGED HEART    Breast Cancer Sister     COPD Brother     Cancer Maternal Grandmother         UTERINE    Other Maternal Grandfather         SEPSIS    Cancer Brother         PANCREATIC       Diet:  PN-Adult 2-in-1 Central Line (Standard)  ADULT DIET; Regular; Low Fiber  PN-Adult 2-in-1 Central Line (Custom)    Review of systems:   Pertinent positives as noted in the HPI. All other systems reviewed and negative.    PHYSICAL EXAM:  BP (!) 145/72   Pulse 77   Temp 98.4 °F (36.9 °C) (Oral)   Resp 18   Ht 1.651 m (5' 5\")   Wt 79.7 kg (175 lb 12.8 oz)   LMP 02/24/1998   SpO2 99%   BMI 29.25 kg/m²   General appearance: No apparent distress, appears stated age and cooperative.  HEENT: Normal cephalic, atraumatic without obvious deformity. Pupils equal, round, and reactive to light.  Extra ocular muscles intact.

## 2025-03-08 NOTE — PROGRESS NOTES
Occupational Therapy  Facility/Department: [facility]  Daily Treatment Note  NAME: Ness Castañeda  : 1950  MRN: [id number]    Date of Service: 3/8/2025    Discharge Recommendations:  Patient would benefit from continued therapy after discharge   Due to recent hospitalization and medical condition, pt would benefit from additional intermittent skilled therapy at time of discharge.  Please refer to the AM-PAC score for current functional status.     Patient Diagnosis(es): The encounter diagnosis was Diverticulosis.     Assessment   Assessment: Pt tolerated tx well despite fatigue, and is progressing towards goals, although continues to requires SBA with transfers and Gema ADLs. Pt would benefit from continued skilled OT services to address deficits in areas of functional balance, functional reach, ADL completion, safety awareness, ADL transfers, bed mobility, UE strength, and functional mobility, all to ensure safe return home to [facility] and decrease caregiver burden.     Activity Tolerance: Patient tolerated treatment well  Discharge Recommendations: Patient would benefit from continued therapy after discharge     Plan  Occupational Therapy Plan  Times Per Week: 5x/week 1x/day as raj  Current Treatment Recommendations: Strengthening;Balance training;Functional mobility training;Endurance training;Safety education & training;Pain management;Neuromuscular re-education;Equipment evaluation, education, & procurement;Self-Care / ADL;Patient/Caregiver education & training;Home management training;Cognitive/Perceptual training;Coordination training;Positioning    Restrictions: abd sx, binder, drain       Subjective  Subjective  Subjective: Pt agreeable to therapy.  Pain: No pain noted to impact function.  Orientation  Overall Orientation Status: Within Functional Limits  Cognition  Overall Cognitive Status: Exceptions  Memory: Decreased recall of precautions  Insights: Decreased awareness of deficits    SUP level  Short Term Goal 5: standing to SUP and AD raj > 6 mins as able to reduce falls in function.  Long Term Goals  Long Term Goal 1: Pt/caregivers to be I with pressure relief, edema mgt tech, EC/WS and fall prevention tech, DME/AE recommendations with use of handouts.  Patient Goals   Patient goals : Pt states she is hoping to go home in the next 2 days!    AM-PAC - ADL  AM-PAC Daily Activity - Inpatient   How much help is needed for putting on and taking off regular lower body clothing?: A Lot  How much help is needed for bathing (which includes washing, rinsing, drying)?: A Lot  How much help is needed for toileting (which includes using toilet, bedpan, or urinal)?: A Little  How much help is needed for putting on and taking off regular upper body clothing?: A Little  How much help is needed for taking care of personal grooming?: A Little  How much help for eating meals?: None  AM-PAC Inpatient Daily Activity Raw Score: 17  AM-PAC Inpatient ADL T-Scale Score : 37.26  ADL Inpatient CMS 0-100% Score: 50.11  ADL Inpatient CMS G-Code Modifier : CK    Therapy Time   Individual Concurrent Group Co-treatment   Time In 1004         Time Out 1028         Minutes 24                 CLARIBEL Vaughan

## 2025-03-09 ENCOUNTER — APPOINTMENT (OUTPATIENT)
Dept: GENERAL RADIOLOGY | Age: 75
DRG: 329 | End: 2025-03-09
Attending: SURGERY
Payer: MEDICARE

## 2025-03-09 LAB
ANION GAP SERPL CALCULATED.3IONS-SCNC: 9 MMOL/L (ref 9–16)
BILIRUB UR QL STRIP: NEGATIVE
BUN SERPL-MCNC: 16 MG/DL (ref 8–23)
CALCIUM SERPL-MCNC: 9.4 MG/DL (ref 8.8–10.2)
CHLORIDE SERPL-SCNC: 105 MMOL/L (ref 98–107)
CLARITY UR: CLEAR
CO2 SERPL-SCNC: 26 MMOL/L (ref 20–31)
COLOR UR: YELLOW
CREAT SERPL-MCNC: 1.2 MG/DL (ref 0.5–0.9)
ERYTHROCYTE [DISTWIDTH] IN BLOOD BY AUTOMATED COUNT: 19.6 % (ref 11.8–14.4)
GFR, ESTIMATED: 48 ML/MIN/1.73M2
GLUCOSE BLD-MCNC: 128 MG/DL (ref 65–105)
GLUCOSE BLD-MCNC: 142 MG/DL (ref 65–105)
GLUCOSE BLD-MCNC: 145 MG/DL (ref 65–105)
GLUCOSE BLD-MCNC: 146 MG/DL (ref 65–105)
GLUCOSE SERPL-MCNC: 151 MG/DL (ref 82–115)
GLUCOSE UR STRIP-MCNC: NEGATIVE MG/DL
HCG UR QL: NEGATIVE
HCT VFR BLD AUTO: 27 % (ref 36.3–47.1)
HGB BLD-MCNC: 7.9 G/DL (ref 11.9–15.1)
HGB UR QL STRIP.AUTO: NEGATIVE
KETONES UR STRIP-MCNC: NEGATIVE MG/DL
LEUKOCYTE ESTERASE UR QL STRIP: NEGATIVE
MAGNESIUM SERPL-MCNC: 1.9 MG/DL (ref 1.6–2.4)
MCH RBC QN AUTO: 26.1 PG (ref 25.2–33.5)
MCHC RBC AUTO-ENTMCNC: 29.3 G/DL (ref 28.4–34.8)
MCV RBC AUTO: 89.1 FL (ref 82.6–102.9)
NITRITE UR QL STRIP: NEGATIVE
NRBC BLD-RTO: 0.1 PER 100 WBC
PH UR STRIP: 6 [PH] (ref 5–8)
PHOSPHATE SERPL-MCNC: 2.8 MG/DL (ref 2.5–4.5)
PLATELET # BLD AUTO: 392 K/UL (ref 138–453)
PMV BLD AUTO: 9.6 FL (ref 8.1–13.5)
POTASSIUM SERPL-SCNC: 3.8 MMOL/L (ref 3.7–5.3)
PROT UR STRIP-MCNC: NEGATIVE MG/DL
RBC # BLD AUTO: 3.03 M/UL (ref 3.95–5.11)
SODIUM SERPL-SCNC: 140 MMOL/L (ref 136–145)
SP GR UR STRIP: 1.01 (ref 1–1.03)
UROBILINOGEN UR STRIP-ACNC: NORMAL EU/DL (ref 0–1)
WBC OTHER # BLD: 16.1 K/UL (ref 3.5–11.3)

## 2025-03-09 PROCEDURE — 1200000000 HC SEMI PRIVATE

## 2025-03-09 PROCEDURE — 81025 URINE PREGNANCY TEST: CPT

## 2025-03-09 PROCEDURE — 71045 X-RAY EXAM CHEST 1 VIEW: CPT

## 2025-03-09 PROCEDURE — 6360000002 HC RX W HCPCS

## 2025-03-09 PROCEDURE — 6360000002 HC RX W HCPCS: Performed by: STUDENT IN AN ORGANIZED HEALTH CARE EDUCATION/TRAINING PROGRAM

## 2025-03-09 PROCEDURE — 6370000000 HC RX 637 (ALT 250 FOR IP): Performed by: STUDENT IN AN ORGANIZED HEALTH CARE EDUCATION/TRAINING PROGRAM

## 2025-03-09 PROCEDURE — 2580000003 HC RX 258: Performed by: SURGERY

## 2025-03-09 PROCEDURE — 36415 COLL VENOUS BLD VENIPUNCTURE: CPT

## 2025-03-09 PROCEDURE — 97530 THERAPEUTIC ACTIVITIES: CPT

## 2025-03-09 PROCEDURE — 2500000003 HC RX 250 WO HCPCS: Performed by: SURGERY

## 2025-03-09 PROCEDURE — 82947 ASSAY GLUCOSE BLOOD QUANT: CPT

## 2025-03-09 PROCEDURE — 84100 ASSAY OF PHOSPHORUS: CPT

## 2025-03-09 PROCEDURE — 81003 URINALYSIS AUTO W/O SCOPE: CPT

## 2025-03-09 PROCEDURE — 6360000002 HC RX W HCPCS: Performed by: SURGERY

## 2025-03-09 PROCEDURE — 6370000000 HC RX 637 (ALT 250 FOR IP): Performed by: SURGERY

## 2025-03-09 PROCEDURE — 2500000003 HC RX 250 WO HCPCS: Performed by: STUDENT IN AN ORGANIZED HEALTH CARE EDUCATION/TRAINING PROGRAM

## 2025-03-09 PROCEDURE — 80048 BASIC METABOLIC PNL TOTAL CA: CPT

## 2025-03-09 PROCEDURE — 2580000003 HC RX 258

## 2025-03-09 PROCEDURE — 2580000003 HC RX 258: Performed by: STUDENT IN AN ORGANIZED HEALTH CARE EDUCATION/TRAINING PROGRAM

## 2025-03-09 PROCEDURE — 83735 ASSAY OF MAGNESIUM: CPT

## 2025-03-09 PROCEDURE — 97116 GAIT TRAINING THERAPY: CPT

## 2025-03-09 PROCEDURE — 74018 RADEX ABDOMEN 1 VIEW: CPT

## 2025-03-09 PROCEDURE — 85027 COMPLETE CBC AUTOMATED: CPT

## 2025-03-09 RX ORDER — METOCLOPRAMIDE HYDROCHLORIDE 5 MG/ML
5 INJECTION INTRAMUSCULAR; INTRAVENOUS EVERY 6 HOURS
Status: DISCONTINUED | OUTPATIENT
Start: 2025-03-09 | End: 2025-03-13 | Stop reason: HOSPADM

## 2025-03-09 RX ADMIN — ACETAMINOPHEN 1000 MG: 500 TABLET ORAL at 13:55

## 2025-03-09 RX ADMIN — AMLODIPINE BESYLATE 5 MG: 5 TABLET ORAL at 22:34

## 2025-03-09 RX ADMIN — METOCLOPRAMIDE HYDROCHLORIDE 10 MG: 5 INJECTION INTRAMUSCULAR; INTRAVENOUS at 06:02

## 2025-03-09 RX ADMIN — SODIUM CHLORIDE, POTASSIUM CHLORIDE, SODIUM LACTATE AND CALCIUM CHLORIDE: 600; 310; 30; 20 INJECTION, SOLUTION INTRAVENOUS at 14:05

## 2025-03-09 RX ADMIN — METOCLOPRAMIDE HYDROCHLORIDE 5 MG: 5 INJECTION INTRAMUSCULAR; INTRAVENOUS at 12:00

## 2025-03-09 RX ADMIN — LISINOPRIL 10 MG: 10 TABLET ORAL at 22:34

## 2025-03-09 RX ADMIN — METHOCARBAMOL 500 MG: 500 TABLET ORAL at 22:34

## 2025-03-09 RX ADMIN — LISINOPRIL 10 MG: 10 TABLET ORAL at 08:25

## 2025-03-09 RX ADMIN — I.V. FAT EMULSION 100 ML: 20 EMULSION INTRAVENOUS at 17:57

## 2025-03-09 RX ADMIN — SODIUM CHLORIDE, PRESERVATIVE FREE 10 ML: 5 INJECTION INTRAVENOUS at 08:42

## 2025-03-09 RX ADMIN — CALCIUM GLUCONATE: 98 INJECTION INTRAVENOUS at 17:44

## 2025-03-09 RX ADMIN — SODIUM CHLORIDE: 0.9 INJECTION, SOLUTION INTRAVENOUS at 14:12

## 2025-03-09 RX ADMIN — METHOCARBAMOL 500 MG: 500 TABLET ORAL at 08:43

## 2025-03-09 RX ADMIN — METHOCARBAMOL 500 MG: 500 TABLET ORAL at 13:55

## 2025-03-09 RX ADMIN — FAMOTIDINE 20 MG: 20 TABLET, FILM COATED ORAL at 08:25

## 2025-03-09 RX ADMIN — ENOXAPARIN SODIUM 40 MG: 100 INJECTION SUBCUTANEOUS at 08:25

## 2025-03-09 RX ADMIN — METOCLOPRAMIDE HYDROCHLORIDE 5 MG: 5 INJECTION INTRAMUSCULAR; INTRAVENOUS at 17:47

## 2025-03-09 RX ADMIN — ACETAMINOPHEN 1000 MG: 500 TABLET ORAL at 22:34

## 2025-03-09 RX ADMIN — SODIUM CHLORIDE, POTASSIUM CHLORIDE, SODIUM LACTATE AND CALCIUM CHLORIDE: 600; 310; 30; 20 INJECTION, SOLUTION INTRAVENOUS at 22:40

## 2025-03-09 RX ADMIN — PIPERACILLIN AND TAZOBACTAM 3375 MG: 3; .375 INJECTION, POWDER, LYOPHILIZED, FOR SOLUTION INTRAVENOUS at 06:02

## 2025-03-09 RX ADMIN — PIPERACILLIN AND TAZOBACTAM 3375 MG: 3; .375 INJECTION, POWDER, LYOPHILIZED, FOR SOLUTION INTRAVENOUS at 14:13

## 2025-03-09 RX ADMIN — PIPERACILLIN AND TAZOBACTAM 3375 MG: 3; .375 INJECTION, POWDER, LYOPHILIZED, FOR SOLUTION INTRAVENOUS at 22:33

## 2025-03-09 ASSESSMENT — PAIN DESCRIPTION - LOCATION: LOCATION: ABDOMEN

## 2025-03-09 ASSESSMENT — PAIN SCALES - GENERAL: PAINLEVEL_OUTOF10: 3

## 2025-03-09 NOTE — PROGRESS NOTES
Hospitalist - Progress Note      Patient: Ness Castañeda    Unit/Bed:2018/2018-02  YOB: 1950  MRN: [id number]   Acct: [id number]   PCP: Eri Akbar MD    Date of Service: Pt seen/examined on 03/09/25      Chief Complaint:  medical management    Assessment and Plan:-  Left Colon Sigmoid Mass adhered to the Uterus s/p Left Laprascopic Open Sigmoid Resection with Tap Block  More abd distension, xray with concern for Post Op Ileus. Now has NGT and sxms have improved.   Gen surgery following and managing. On IV antibiotics.   NGT is replaced  NGT is removed now  Repeat CT abdomen reviewed  TPN by general surgery, insulin orders and TPN per general surgery  IV fluids.  Recommended to decrease IV fluids due to peripheral edema    Anemia-likely postoperative on chronic anemia  Recheck hgb. Transfuse for hgb <7  Pt is sitting at 7. If she gets symptomatic, such as low BP, Fast HR, dizziness, sob, cp, then I recommend 1 u prbcs.     CKD III  Cr is at baseline.   Avoid nephrotoxic meds  Lasix 20 mg IV x 1 for peripheral edema    Pre-Diabetes  Monitor glucose.   On metformin. Ok to hold while inpt.     HTN  On norvasc, lisinopril,   If bp >150, will increase norvasc to 10mg and or can increase lisinopril to 20mg qd.     Iron Deficiency  S/p iron infusions x 2     Plan:  Continue home medicines.     Monitor for signs of bleeding.   Gen surgery primary and managing pain , dvt prophy, pt/ot.   ISS, Ac and hs protocol, hypoglycemia protocol.           Subjective:  Pt seen in room.   Patient has a NGT placed again on 3/4/2025.  Noticed some peripheral edema worsening      History Of Present Illness:      74 y F admitted by surgery for Left colon sigmoid mass adhered to the uterus with significant inflammatory process, s/p Lap Robotic assisted open sigmoid resection.   Patient tolerated the procedure well. No acute concerns.   We were consulted for medical management.   Patient hgb 6.8 this morning. Repeat  Constipation (Patient taking differently: Take 1 capsule by mouth nightly) 40 capsule 1    eszopiclone (LUNESTA) 2 MG TABS Take 1 tablet by mouth nightly as needed.      Melatonin 10 MG TABS Take 5 mg by mouth nightly      clobetasol (TEMOVATE) 0.05 % cream Apply topically      metFORMIN (GLUCOPHAGE-XR) 500 MG extended release tablet Take 1 tablet by mouth daily lunch      naftifine (NAFTIN) 1 % cream Apply topically      oxyCODONE-acetaminophen (PERCOCET) 5-325 MG per tablet Take 1 tablet by mouth every 6 hours as needed for Pain . (Patient not taking: Reported on 2/26/2025) 20 tablet 0    metFORMIN (GLUCOPHAGE) 500 MG tablet Take 1 tablet by mouth Daily with supper      Cholecalciferol (VITAMIN D-3 PO) Take 2,000 Units by mouth nightly          Allergies:    Patient has no known allergies.    Social History:    reports that she has never smoked. She has never used smokeless tobacco. She reports that she does not drink alcohol and does not use drugs.    Family History:       Problem Relation Age of Onset    Breast Cancer Mother     Cancer Father         BLADDER    Heart Disease Father         ENLARGED HEART    Breast Cancer Sister     COPD Brother     Cancer Maternal Grandmother         UTERINE    Other Maternal Grandfather         SEPSIS    Cancer Brother         PANCREATIC       Diet:  PN-Adult 2-in-1 Central Line (Custom)  Diet NPO Exceptions are: Sips of Water with Meds  PN-Adult 2-in-1 Central Line (Custom)    Review of systems:   Pertinent positives as noted in the HPI. All other systems reviewed and negative.    PHYSICAL EXAM:  BP (!) 148/77   Pulse 86   Temp 97.9 °F (36.6 °C) (Oral)   Resp 16   Ht 1.651 m (5' 5\")   Wt 82.1 kg (181 lb)   LMP 02/24/1998   SpO2 99%   BMI 30.12 kg/m²   General appearance: No apparent distress, appears stated age and cooperative.  HEENT: Normal cephalic, atraumatic without obvious deformity. Pupils equal, round, and reactive to light.  Extra ocular muscles intact.

## 2025-03-09 NOTE — PLAN OF CARE
Problem: Safety - Adult  Goal: Free from fall injury  Outcome: Progressing  Flowsheets (Taken 3/9/2025 1270)  Free From Fall Injury: Instruct family/caregiver on patient safety  Note: Patient admitted for diverticular disease. Patient NPO as becoming nauseous. Patient had emesis x1 this shift. Nausea improved with scheduled Reglan. Patient alert and orientated and calls out appropriately. Patient ambulates  with assistance. No complaints of pain this shift. Patient free from falls and or injury. Bed and chair locked, bedside table next to chair where patient has been sitting . Call light within reach and gripper socks.

## 2025-03-09 NOTE — PLAN OF CARE
Problem: Discharge Planning  Goal: Discharge to home or other facility with appropriate resources  3/9/2025 0442 by Sandy Camilo RN  Outcome: Progressing  Flowsheets (Taken 3/8/2025 1915)  Discharge to home or other facility with appropriate resources:   Identify barriers to discharge with patient and caregiver   Arrange for needed discharge resources and transportation as appropriate   Identify discharge learning needs (meds, wound care, etc)   Refer to discharge planning if patient needs post-hospital services based on physician order or complex needs related to functional status, cognitive ability or social support system     Problem: Pain  Goal: Verbalizes/displays adequate comfort level or baseline comfort level  3/9/2025 0442 by Sandy Camilo RN  Outcome: Progressing     Problem: Safety - Adult  Goal: Free from fall injury  3/9/2025 0442 by Sandy Camilo RN  Outcome: Progressing     Problem: Chronic Conditions and Co-morbidities  Goal: Patient's chronic conditions and co-morbidity symptoms are monitored and maintained or improved  3/9/2025 0442 by Sandy Camilo RN  Outcome: Progressing  Flowsheets (Taken 3/8/2025 1915)  Care Plan - Patient's Chronic Conditions and Co-Morbidity Symptoms are Monitored and Maintained or Improved: Monitor and assess patient's chronic conditions and comorbid symptoms for stability, deterioration, or improvement     Problem: ABCDS Injury Assessment  Goal: Absence of physical injury  3/9/2025 0442 by Sandy Camilo RN  Outcome: Progressing     Problem: Skin/Tissue Integrity - Adult  Goal: Incisions, wounds, or drain sites healing without S/S of infection  3/9/2025 0442 by Sandy Camilo RN  Outcome: Progressing  Flowsheets (Taken 3/8/2025 1915)  Incisions, Wounds, or Drain Sites Healing Without Sign and Symptoms of Infection:   ADMISSION and DAILY: Assess and document risk factors for pressure ulcer development   TWICE DAILY: Assess and document skin integrity     Problem:

## 2025-03-09 NOTE — PROGRESS NOTES
General Surgery Progress Note            PATIENT NAME: Ness Castañeda     TODAY'S DATE: 3/9/2025, 7:40 AM    SUBJECTIVE:    Patient seen and examined at the bedside.  Yesterday, the patient was advanced to regular diet and had her NGT removed after 48 hours of clamp trial.  Unfortunately, the patient was unable to tolerate regular diet and started to have small volume emesis and dry heaves.  This morning on examination, she continues to dry heave.  She reports she continues to have small-volume bowel movements, but she does feel bloated.  She remains VSS, afebrile.      OBJECTIVE:   VITALS:  BP (!) 160/75   Pulse 82   Temp 97.7 °F (36.5 °C) (Oral)   Resp 16   Ht 1.651 m (5' 5\")   Wt 82.1 kg (181 lb)   LMP 02/24/1998   SpO2 97%   BMI 30.12 kg/m²      INTAKE/OUTPUT:      Intake/Output Summary (Last 24 hours) at 3/9/2025 0740  Last data filed at 3/9/2025 0430  Gross per 24 hour   Intake --   Output 1345 ml   Net -1345 ml       PHYSICAL EXAM  GEN: Alert, awake, oriented, NAD  HEENT: normocephalic, no scleral icterus, moist mucous membranes  CV: Regular rate and rhythm  LUNG: Unlabored breathing on RA  ABDOMEN: Soft, softly distended, mildly tender to palpation near incisions.  No rebound, guarding, or rigidity.  Incisions are clean and dry with staples intact.   No active bleeding noted. SOFÍA intact with serous output.  EXTREMITIES: No edema, cyanosis or clubbing    Data:  CBC with Differential:    Lab Results   Component Value Date/Time    WBC 16.1 03/09/2025 05:11 AM    RBC 3.03 03/09/2025 05:11 AM    HGB 7.9 03/09/2025 05:11 AM    HCT 27.0 03/09/2025 05:11 AM     03/09/2025 05:11 AM    MCV 89.1 03/09/2025 05:11 AM    MCH 26.1 03/09/2025 05:11 AM    MCHC 29.3 03/09/2025 05:11 AM    RDW 19.6 03/09/2025 05:11 AM    LYMPHOPCT 7 03/05/2025 05:47 AM    MONOPCT 6 03/05/2025 05:47 AM    EOSPCT 2 03/05/2025 05:47 AM    BASOPCT 0 03/05/2025 05:47 AM    MONOSABS 0.93 03/05/2025 05:47 AM    LYMPHSABS 1.17 03/05/2025    abdomen. No gas in the rectum. Findings most likely represent postoperative   ileus.  Suggest follow-up KUB as per clinical.   2. Rounded soft tissue density right transverse colon, possibly a polyp or   other soft tissue density.         XR ABDOMEN (KUB) (SINGLE AP VIEW)    (Results Pending)   XR CHEST PORTABLE    (Results Pending)       ASSESSMENT   74 year old female s/p robotic assisted open sigmoid resection 2/26/2025  Pathology: Diverticulosis, abscess cavity with associated dense adhesions and inflammation    Plan  Patient seen and examined, some nausea and emesis   Diet: NPO  KUB, CXR, UA  Antiemetics: As needed, added Reglan  Leukocytosis: Improved to 16.1 (11.8, 16.6, 16.0, 20.7)  Continue to monitor bowel function.   Strict I's and O's  Continue IV antibiotics, Zosyn  Continue daily wound care.  RN order placed  Encourage incentive spirometer use  Encourage OOB and ambulation, work with PT/OT  Dispo: Return to n.p.o., antiemetics, follow-up CXR and KUB, UA, supportive care      CHERYL Morris DO - PGY5  Surgery Department    Attending Physician Statement  I have discussed the case, including pertinent history and exam findings with the resident. I agree with the assessment, plan and orders as documented by the resident.     Few episodes of emesis yesterday. Some BMs  Labs noted  Check abdominal films and chest xray

## 2025-03-09 NOTE — PROGRESS NOTES
Physical Therapy  Facility/Department: [facility] SURG  Rehabilitation Physical Therapy Treatment Note    NAME: Ness Castañeda  : 1950 (74 y.o.)  MRN: [id number]  CODE STATUS: Full Code    Date of Service: 3/9/25       Restrictions:  Restrictions/Precautions: Fall Risk, General Precautions  Position Activity Restriction  Other Position/Activity Restrictions: RUE IV, abd binder as needed when up, alarms, SOFÍA drain,  up with assist     SUBJECTIVE  Subjective: pt in bed agreeable to PT       OBJECTIVE  Cognition  Overall Cognitive Status: Exceptions  Following Commands: Follows multistep commands with repitition;Follows multistep commands with increased time  Attention Span: Appears intact  Memory: Decreased recall of precautions  Safety Judgement: Decreased awareness of need for safety;Decreased awareness of need for assistance  Problem Solving: Assistance required to identify errors made;Assistance required to correct errors made;Decreased awareness of errors;Assistance required to implement solutions  Insights: Decreased awareness of deficits  Initiation: Requires cues for some  Sequencing: Requires cues for some  Cognition Comment: Pt thankful this date for session, pt needing cues for safety.  Orientation  Overall Orientation Status: Within Functional Limits  Orientation Level: Oriented X4    Functional Mobility  Bed Mobility  Overall Assistance Level: Minimal Assistance  Roll Right  Assistance Level: Minimal assistance  Supine to Sit  Assistance Level: Minimal assistance  Scooting  Assistance Level: Minimal assistance  Transfers  Surface: From chair with arms;To chair with arms  Additional Factors: Verbal cues  Sit to Stand  Assistance Level: Contact guard assist;Stand by assist  Stand to Sit  Assistance Level: Contact guard assist;Stand by assist  Bed To/From Chair  Technique: Stand step;Stand pivot  Assistance Level: Stand by assist;Contact guard assist  Stand Pivot  Assistance Level: Contact guard  Training;Plan of Care;ADL Function;Energy Conservation;Home Exercise Program;Mobility Training  Education Method: Verbal  Barriers to Learning: None  Education Outcome: Verbalized understanding    AM-PAC Score    AM-PAC Inpatient Mobility Raw Score : 18  AM-PAC Inpatient T-Scale Score : 43.63  Mobility Inpatient CMS 0-100% Score: 46.58  Mobility Inpatient CMS G-Code Modifier:CK        Therapy Time   Individual Concurrent Group Co-treatment   Time In 0839         Time Out 0903         Minutes 24                   ELENI AUGUSTINE PTA, 03/09/25 at 9:35 AM

## 2025-03-09 NOTE — PROGRESS NOTES
Nutrition Assessment     Type and Reason for Visit: Nutrition support, Reassess    Nutrition Recommendations/Plan:   Diet NPO Exceptions are: Ice Chips  Central custom TPN at 60 mL/hr (1440 mL total volume) and 100 mL of lipids  Monitor TPN rate, tolerance, GI function, nutrition progression and labs     Malnutrition Assessment:  Malnutrition Status: At risk for malnutrition    Nutrition Assessment:  Patient was started on a Regular; Low Fiber diet yesterday (3/8) after NG tube was discontinued but ended up not tolerating food. Patient had green emesis and was made NPO again this morning (3/9). Bowel sounds are mostly hypoactive and patient feels bloated. Central custom TPN will continue at 60 mL/hr (1440 mL total volume) and 100 mL of lipids. Monitor GI function, TPN rate, tolerance, nutrition progression, weights and labs.    Estimated Daily Nutrient Needs:  Energy (kcal):  7711-4385 kcal (20-25 kcal/kg) Weight Used for Energy Requirements: Admission     Protein (g):  74-91 gm of protein (1.3-1.6 gm/kg) Weight Used for Protein Requirements: Ideal        Fluid (ml/day):  7479-0810 mL Method Used for Fluid Requirements: 1 ml/kcal    Nutrition Related Findings:   Edema: trace BLE. Bowel sounds: RUQ- hypoactive, LUQ- active, RLQ- hypoactive, LLQ- hypoactive. Nausea and green emesis. Patient feels bloated Wound Type: Surgical Incision    Current Nutrition Therapies:    PN-Adult 2-in-1 Central Line (Custom)  Diet NPO Exceptions are: Sips of Water with Meds  PN-Adult 2-in-1 Central Line (Custom)    Anthropometric Measures:  Height: 165.1 cm (5' 5\")  Current Body Wt: 82.1 kg (181 lb)   BMI: 30.1        Nutrition Diagnosis:   Inadequate oral intake related to inadequate protein-energy intake, altered GI function as evidenced by s/p surgery, GI abnormality, nutrition support - parenteral nutrition, intake 0-25%    Nutrition Interventions:   Food and/or Nutrient Delivery: Continue NPO, Continue Current Parenteral

## 2025-03-10 LAB
ALBUMIN SERPL-MCNC: 2.6 G/DL (ref 3.5–5.2)
ALBUMIN/GLOB SERPL: 1.2 {RATIO} (ref 1–2.5)
ALP SERPL-CCNC: 73 U/L (ref 35–104)
ALT SERPL-CCNC: 14 U/L (ref 10–35)
ANION GAP SERPL CALCULATED.3IONS-SCNC: 8 MMOL/L (ref 9–16)
AST SERPL-CCNC: 23 U/L (ref 10–35)
BILIRUB DIRECT SERPL-MCNC: <0.1 MG/DL (ref 0–0.2)
BILIRUB INDIRECT SERPL-MCNC: ABNORMAL MG/DL
BILIRUB SERPL-MCNC: <0.2 MG/DL (ref 0–1.2)
BUN SERPL-MCNC: 14 MG/DL (ref 8–23)
CALCIUM SERPL-MCNC: 8.9 MG/DL (ref 8.8–10.2)
CHLORIDE SERPL-SCNC: 109 MMOL/L (ref 98–107)
CO2 SERPL-SCNC: 24 MMOL/L (ref 20–31)
CREAT SERPL-MCNC: 1.1 MG/DL (ref 0.5–0.9)
ERYTHROCYTE [DISTWIDTH] IN BLOOD BY AUTOMATED COUNT: 19.9 % (ref 11.8–14.4)
GFR, ESTIMATED: 53 ML/MIN/1.73M2
GLUCOSE BLD-MCNC: 105 MG/DL (ref 65–105)
GLUCOSE BLD-MCNC: 110 MG/DL (ref 65–105)
GLUCOSE BLD-MCNC: 111 MG/DL (ref 65–105)
GLUCOSE BLD-MCNC: 124 MG/DL (ref 65–105)
GLUCOSE BLD-MCNC: 87 MG/DL (ref 65–105)
GLUCOSE SERPL-MCNC: 88 MG/DL (ref 82–115)
HCT VFR BLD AUTO: 25.1 % (ref 36.3–47.1)
HGB BLD-MCNC: 7.3 G/DL (ref 11.9–15.1)
MAGNESIUM SERPL-MCNC: 1.8 MG/DL (ref 1.6–2.4)
MCH RBC QN AUTO: 26.5 PG (ref 25.2–33.5)
MCHC RBC AUTO-ENTMCNC: 29.1 G/DL (ref 28.4–34.8)
MCV RBC AUTO: 91.3 FL (ref 82.6–102.9)
NRBC BLD-RTO: 0 PER 100 WBC
PHOSPHATE SERPL-MCNC: 2.8 MG/DL (ref 2.5–4.5)
PLATELET # BLD AUTO: 324 K/UL (ref 138–453)
PMV BLD AUTO: 9.9 FL (ref 8.1–13.5)
POTASSIUM SERPL-SCNC: 3.8 MMOL/L (ref 3.7–5.3)
PROT SERPL-MCNC: 4.7 G/DL (ref 6.6–8.7)
RBC # BLD AUTO: 2.75 M/UL (ref 3.95–5.11)
SODIUM SERPL-SCNC: 141 MMOL/L (ref 136–145)
WBC OTHER # BLD: 10.3 K/UL (ref 3.5–11.3)

## 2025-03-10 PROCEDURE — 2580000003 HC RX 258: Performed by: SURGERY

## 2025-03-10 PROCEDURE — 6360000002 HC RX W HCPCS: Performed by: STUDENT IN AN ORGANIZED HEALTH CARE EDUCATION/TRAINING PROGRAM

## 2025-03-10 PROCEDURE — 2500000003 HC RX 250 WO HCPCS: Performed by: SURGERY

## 2025-03-10 PROCEDURE — 83735 ASSAY OF MAGNESIUM: CPT

## 2025-03-10 PROCEDURE — 84100 ASSAY OF PHOSPHORUS: CPT

## 2025-03-10 PROCEDURE — 1200000000 HC SEMI PRIVATE

## 2025-03-10 PROCEDURE — 85027 COMPLETE CBC AUTOMATED: CPT

## 2025-03-10 PROCEDURE — 6370000000 HC RX 637 (ALT 250 FOR IP): Performed by: SURGERY

## 2025-03-10 PROCEDURE — 6370000000 HC RX 637 (ALT 250 FOR IP)

## 2025-03-10 PROCEDURE — 80076 HEPATIC FUNCTION PANEL: CPT

## 2025-03-10 PROCEDURE — 82947 ASSAY GLUCOSE BLOOD QUANT: CPT

## 2025-03-10 PROCEDURE — 6360000002 HC RX W HCPCS

## 2025-03-10 PROCEDURE — 2580000003 HC RX 258: Performed by: STUDENT IN AN ORGANIZED HEALTH CARE EDUCATION/TRAINING PROGRAM

## 2025-03-10 PROCEDURE — 97530 THERAPEUTIC ACTIVITIES: CPT

## 2025-03-10 PROCEDURE — 2580000003 HC RX 258

## 2025-03-10 PROCEDURE — 6370000000 HC RX 637 (ALT 250 FOR IP): Performed by: STUDENT IN AN ORGANIZED HEALTH CARE EDUCATION/TRAINING PROGRAM

## 2025-03-10 PROCEDURE — 2500000003 HC RX 250 WO HCPCS: Performed by: STUDENT IN AN ORGANIZED HEALTH CARE EDUCATION/TRAINING PROGRAM

## 2025-03-10 PROCEDURE — 36415 COLL VENOUS BLD VENIPUNCTURE: CPT

## 2025-03-10 PROCEDURE — 80048 BASIC METABOLIC PNL TOTAL CA: CPT

## 2025-03-10 PROCEDURE — 6360000002 HC RX W HCPCS: Performed by: SURGERY

## 2025-03-10 PROCEDURE — 97535 SELF CARE MNGMENT TRAINING: CPT

## 2025-03-10 PROCEDURE — 97116 GAIT TRAINING THERAPY: CPT

## 2025-03-10 RX ADMIN — FAMOTIDINE 20 MG: 10 INJECTION, SOLUTION INTRAVENOUS at 10:39

## 2025-03-10 RX ADMIN — AMLODIPINE BESYLATE 5 MG: 5 TABLET ORAL at 21:30

## 2025-03-10 RX ADMIN — ENOXAPARIN SODIUM 40 MG: 100 INJECTION SUBCUTANEOUS at 10:05

## 2025-03-10 RX ADMIN — METOCLOPRAMIDE HYDROCHLORIDE 5 MG: 5 INJECTION INTRAMUSCULAR; INTRAVENOUS at 04:31

## 2025-03-10 RX ADMIN — PIPERACILLIN AND TAZOBACTAM 3375 MG: 3; .375 INJECTION, POWDER, LYOPHILIZED, FOR SOLUTION INTRAVENOUS at 04:34

## 2025-03-10 RX ADMIN — LISINOPRIL 10 MG: 10 TABLET ORAL at 21:30

## 2025-03-10 RX ADMIN — METOCLOPRAMIDE HYDROCHLORIDE 5 MG: 5 INJECTION INTRAMUSCULAR; INTRAVENOUS at 18:32

## 2025-03-10 RX ADMIN — METHOCARBAMOL 500 MG: 500 TABLET ORAL at 10:32

## 2025-03-10 RX ADMIN — SODIUM CHLORIDE, POTASSIUM CHLORIDE, SODIUM LACTATE AND CALCIUM CHLORIDE: 600; 310; 30; 20 INJECTION, SOLUTION INTRAVENOUS at 07:21

## 2025-03-10 RX ADMIN — PIPERACILLIN AND TAZOBACTAM 3375 MG: 3; .375 INJECTION, POWDER, LYOPHILIZED, FOR SOLUTION INTRAVENOUS at 21:36

## 2025-03-10 RX ADMIN — ACETAMINOPHEN 1000 MG: 500 TABLET ORAL at 05:04

## 2025-03-10 RX ADMIN — METHOCARBAMOL 500 MG: 500 TABLET ORAL at 21:30

## 2025-03-10 RX ADMIN — I.V. FAT EMULSION 100 ML: 20 EMULSION INTRAVENOUS at 18:16

## 2025-03-10 RX ADMIN — METOCLOPRAMIDE HYDROCHLORIDE 5 MG: 5 INJECTION INTRAMUSCULAR; INTRAVENOUS at 12:16

## 2025-03-10 RX ADMIN — METOCLOPRAMIDE HYDROCHLORIDE 5 MG: 5 INJECTION INTRAMUSCULAR; INTRAVENOUS at 00:36

## 2025-03-10 RX ADMIN — LISINOPRIL 10 MG: 10 TABLET ORAL at 10:33

## 2025-03-10 RX ADMIN — ACETAMINOPHEN 1000 MG: 500 TABLET ORAL at 21:30

## 2025-03-10 RX ADMIN — METHOCARBAMOL 500 MG: 500 TABLET ORAL at 13:41

## 2025-03-10 RX ADMIN — CALCIUM GLUCONATE: 98 INJECTION INTRAVENOUS at 18:18

## 2025-03-10 RX ADMIN — PIPERACILLIN AND TAZOBACTAM 3375 MG: 3; .375 INJECTION, POWDER, LYOPHILIZED, FOR SOLUTION INTRAVENOUS at 13:49

## 2025-03-10 RX ADMIN — SODIUM CHLORIDE, POTASSIUM CHLORIDE, SODIUM LACTATE AND CALCIUM CHLORIDE: 600; 310; 30; 20 INJECTION, SOLUTION INTRAVENOUS at 16:08

## 2025-03-10 RX ADMIN — ACETAMINOPHEN 1000 MG: 500 TABLET ORAL at 13:41

## 2025-03-10 ASSESSMENT — PAIN SCALES - GENERAL
PAINLEVEL_OUTOF10: 1
PAINLEVEL_OUTOF10: 0
PAINLEVEL_OUTOF10: 0
PAINLEVEL_OUTOF10: 1
PAINLEVEL_OUTOF10: 0

## 2025-03-10 ASSESSMENT — PAIN DESCRIPTION - PAIN TYPE: TYPE: SURGICAL PAIN

## 2025-03-10 ASSESSMENT — PAIN - FUNCTIONAL ASSESSMENT: PAIN_FUNCTIONAL_ASSESSMENT: ACTIVITIES ARE NOT PREVENTED

## 2025-03-10 ASSESSMENT — PAIN DESCRIPTION - DESCRIPTORS
DESCRIPTORS: ACHING
DESCRIPTORS: ACHING

## 2025-03-10 ASSESSMENT — PAIN DESCRIPTION - ONSET: ONSET: ON-GOING

## 2025-03-10 ASSESSMENT — PAIN DESCRIPTION - LOCATION
LOCATION: ABDOMEN
LOCATION: ABDOMEN

## 2025-03-10 ASSESSMENT — PAIN DESCRIPTION - ORIENTATION: ORIENTATION: LEFT

## 2025-03-10 ASSESSMENT — PAIN DESCRIPTION - FREQUENCY: FREQUENCY: CONTINUOUS

## 2025-03-10 NOTE — PROGRESS NOTES
Nutrition Assessment     Type and Reason for Visit: Reassess    Nutrition Recommendations/Plan:   ADULT DIET; Clear Liquid  Ensure Clear 2x/day  Monitor p.o intakes, diet tolerance, nutrition progression, weights and labs     Malnutrition Assessment:  Malnutrition Status: At risk for malnutrition    Nutrition Assessment:  Patient is now on a Clear liquid diet which is tolerated. Patient agreed to try Ensure Clear supplement at breakfast and dinner. Patient will continue on central custom TPN at 60 mL/hr (1440 mL total volume) and 100 mL of lipids. Monitor p.o intakes, diet tolerance, nutrition progression, weights and labs.    Estimated Daily Nutrient Needs:  Energy (kcal):  4464-1924 kcal (20-25 kcal/kg) Weight Used for Energy Requirements: Admission     Protein (g):  74-91 gm of protein (1.3-1.6 gm/kg) Weight Used for Protein Requirements: Ideal        Fluid (ml/day):  6800-4205 mL Method Used for Fluid Requirements: 1 ml/kcal    Nutrition Related Findings:   Edema: +1 pitting BUE, +1 trace RLE, +1 pitting LLE. Hypoactive bowel sounds, flatus, hiccuping. Wound Type: Surgical Incision    Current Nutrition Therapies:    PN-Adult 2-in-1 Central Line (Custom)  ADULT DIET; Clear Liquid  PN-Adult 2-in-1 Central Line (Custom)  ADULT ORAL NUTRITION SUPPLEMENT; Breakfast, Dinner; Clear Liquid Oral Supplement    Anthropometric Measures:  Height: 165.1 cm (5' 5\")  Current Body Wt: 82.1 kg (181 lb)   BMI: 30.1        Nutrition Diagnosis:   Inadequate oral intake related to inadequate protein-energy intake, altered GI function as evidenced by NPO or clear liquid status due to medical condition, nutrition support - parenteral nutrition, s/p surgery, GI abnormality    Nutrition Interventions:   Food and/or Nutrient Delivery: Continue Current Parenteral Nutrition, Continue Current Diet, Start Oral Nutrition Supplement  Nutrition Education/Counseling: Education/Counseling not indicated  Coordination of Nutrition Care: Continue to  monitor while inpatient       Goals:  Goals: Meet at least 75% of estimated needs, Tolerate nutrition support at goal rate  Type of Goal: New goal  Previous Goal Met: Progress towards Goal(s) Declining    Nutrition Monitoring and Evaluation:      Food/Nutrient Intake Outcomes: Progression of Nutrition, Food and Nutrient Intake, Supplement Intake, Diet Advancement/Tolerance  Physical Signs/Symptoms Outcomes: Biochemical Data, Nausea or Vomiting, Skin, Weight, GI Status, Fluid Status or Edema    Discharge Planning:    Too soon to determine         Nadja LAMBN, RDN, LDN  Lead Clinical Dietitian  RD Office Phone [phone]

## 2025-03-10 NOTE — PROGRESS NOTES
Occupational Therapy  Facility/Department: [facility] MED SURG  Rehabilitation Occupational Therapy Daily Treatment Note    Date: 3/10/25  Patient Name: Ness Castañeda       Room:   MRN: [id number]  Account: [id number]   : 1950  (74 y.o.) Gender: female                    Past Medical History:  has a past medical history of Arthritis, Bladder tumor, Blood in urine, Cancer (HCC), Cancer (HCC), Dental crowns present, Diabetes mellitus (HCC), Diverticulitis, Hypertension, Irregular heart beat, Kidney disease, Prediabetes, Skin cancer, and Wears glasses.  Past Surgical History:   has a past surgical history that includes bladder tumor excision (2017); Colonoscopy (); Dilation and curettage of uterus (); Tonsillectomy (); partial nephrectomy (Right, 2017); partial nephrectomy (Right, 2017); Esophagogastroduodenoscopy; skin biopsy; Skin cancer excision; and Sigmoid Colectomy (N/A, 2025).    Restrictions  Restrictions/Precautions: Fall Risk, General Precautions, Surgical Protocols  Other Position/Activity Restrictions: RUE IV, abd binder as needed when up, alarms, SOFÍA drain,  up with assist  Required Braces or Orthoses  Other: Abdominal Binder  Required Braces or Orthoses?: Yes    Subjective  Subjective: Pt in chair upon arrival with visitor present. Pt agreeable to therapy.  Restrictions/Precautions: Fall Risk;General Precautions;Surgical Protocols             Objective     Cognition  Overall Cognitive Status: Exceptions  Arousal/Alertness: Appears intact  Following Commands: Follows multistep commands consistently  Attention Span: Appears intact  Memory: Decreased recall of precautions  Safety Judgement: Decreased awareness of need for safety  Problem Solving: Decreased awareness of errors  Insights: Decreased awareness of deficits  Initiation: Requires cues for some  Sequencing: Requires cues for some  Orientation  Overall Orientation Status: Within Functional Limits  and ABD prec. Cont with OT POC to ensure a safe return to PLOF.  Activity Tolerance: Patient tolerated treatment well;Patient limited by endurance  Discharge Recommendations: Patient would benefit from continued therapy after discharge  Safety Devices  Safety Devices in place: Yes  Type of devices: Left in chair;Nurse notified;Call light within reach    Patient Education  Education  Education Given To: Patient  Education Provided: Mobility Training;Fall Prevention Strategies;Transfer Training;Energy Conservation;Precautions;Safety;ADL Function;Equipment  Education Method: Verbal;Teach Back  Barriers to Learning: None  Education Outcome: Verbalized understanding;Demonstrated understanding;Continued education needed    Plan  Occupational Therapy Plan  Times Per Week: 5x/week 1x/day as raj  Current Treatment Recommendations: Strengthening;Balance training;Functional mobility training;Endurance training;Safety education & training;Pain management;Neuromuscular re-education;Equipment evaluation, education, & procurement;Self-Care / ADL;Patient/Caregiver education & training;Home management training;Cognitive/Perceptual training;Coordination training;Positioning    Goals  Patient Goals   Patient goals : Pt states she is hoping to go home in the next 2 days!  Short Term Goals  Time Frame for Short Term Goals: by discharge, pt to demo  Short Term Goal 1: bed mob tasks with use of rail/proper log rolling tech to SUP.  Short Term Goal 2: I with BUE HEP with use of hanouts to maintain functiona use. (HEP issued)  Short Term Goal 3: total body ADL tasks with use of grab bar/AD and AD as needed to SUP.  Short Term Goal 4: ADL transfers and functional mob with AD to SUP level  Short Term Goal 5: standing to SUP and AD raj > 10+ mins as able to reduce falls in function. (goal updated per RANDI, OTR)  Long Term Goals  Long Term Goal 1: Pt/caregivers to be I with pressure relief, edema mgt tech, EC/WS and fall prevention tech, DME/AE

## 2025-03-10 NOTE — PLAN OF CARE
Problem: Discharge Planning  Goal: Discharge to home or other facility with appropriate resources  Outcome: Progressing  Flowsheets (Taken 3/9/2025 1940)  Discharge to home or other facility with appropriate resources:   Identify barriers to discharge with patient and caregiver   Arrange for needed discharge resources and transportation as appropriate   Identify discharge learning needs (meds, wound care, etc)   Refer to discharge planning if patient needs post-hospital services based on physician order or complex needs related to functional status, cognitive ability or social support system     Problem: Pain  Goal: Verbalizes/displays adequate comfort level or baseline comfort level  Outcome: Progressing     Problem: Safety - Adult  Goal: Free from fall injury  3/10/2025 0321 by Sandy Camilo RN  Outcome: Progressing     Problem: Chronic Conditions and Co-morbidities  Goal: Patient's chronic conditions and co-morbidity symptoms are monitored and maintained or improved  Outcome: Progressing  Flowsheets (Taken 3/9/2025 1940)  Care Plan - Patient's Chronic Conditions and Co-Morbidity Symptoms are Monitored and Maintained or Improved:   Monitor and assess patient's chronic conditions and comorbid symptoms for stability, deterioration, or improvement   Collaborate with multidisciplinary team to address chronic and comorbid conditions and prevent exacerbation or deterioration     Problem: ABCDS Injury Assessment  Goal: Absence of physical injury  Outcome: Progressing     Problem: Nutrition Deficit:  Goal: Optimize nutritional status  Outcome: Progressing     Problem: Neurosensory - Adult  Goal: Absence of seizures  Outcome: Progressing  Flowsheets (Taken 3/9/2025 1940)  Absence of seizures:   Support airway/breathing, administer oxygen as needed   Monitor for seizure activity.  If seizure occurs, document type and location of movements and any associated apnea     Problem: Neurosensory - Adult  Goal: Remains free of  and drains   Administer medications as ordered     Problem: Metabolic/Fluid and Electrolytes - Adult  Goal: Hemodynamic stability and optimal renal function maintained  Outcome: Progressing  Flowsheets (Taken 3/9/2025 1940)  Hemodynamic stability and optimal renal function maintained: Monitor labs and assess for signs and symptoms of volume excess or deficit     Problem: Metabolic/Fluid and Electrolytes - Adult  Goal: Glucose maintained within prescribed range  Outcome: Progressing  Flowsheets (Taken 3/9/2025 1940)  Glucose maintained within prescribed range:   Monitor blood glucose as ordered   Assess for signs and symptoms of hyperglycemia and hypoglycemia   Administer ordered medications to maintain glucose within target range   Assess barriers to adequate nutritional intake and initiate nutrition consult as needed     Problem: Hematologic - Adult  Goal: Maintains hematologic stability  Outcome: Progressing  Flowsheets (Taken 3/9/2025 1940)  Maintains hematologic stability: Assess for signs and symptoms of bleeding or hemorrhage

## 2025-03-10 NOTE — PROGRESS NOTES
Spiritual Health History and Assessment/Progress Note  [facility]    (P) Initial Encounter,  ,  ,      Name: Ness Castañeda MRN: [id number]    Age: 74 y.o.     Sex: female   Language: English   Taoist: Faith   Diverticular disease     Date: 3/10/2025            Total Time Calculated: (P) 7 min              Spiritual Assessment began in [facility] MED SURG        Referral/Consult From: (P) Rounding   Encounter Overview/Reason: (P) Initial Encounter  Service Provided For: (P) Patient    Patient engaged with  explaining the health issue that brought her to the hospital and expressed \"I am progressing slowly.\" Patient does appear positive in outlook and appreciative of hospital team's care.  provided empathetic listening, interactive conversation and prayer.    Miladys, Belief, Meaning:   Patient has beliefs or practices that help with coping during difficult times  Family/Friends have beliefs or practices that help with coping during difficult times      Importance and Influence:  Patient has spiritual/personal beliefs that influence decisions regarding their health  Family/Friends have spiritual/personal beliefs that influence decisions regarding the patient's health    Community:  Patient feels well-supported. Support system includes: Other: family members  Family/Friends feel well-supported. Support system includes: Other: family members    Assessment and Plan of Care:     Patient Interventions include: Facilitated expression of thoughts and feelings, Explored spiritual coping/struggle/distress, and Affirmed coping skills/support systems  Family/Friends Interventions include: facilitated expression of feelings and concerns    Patient Plan of Care: Spiritual Care available upon further referral  Family/Friends Plan of Care: Spiritual Care available upon further referral    Electronically signed by Chaplain Julia on 3/10/2025 at 3:16 PM

## 2025-03-10 NOTE — PROGRESS NOTES
Hospitalist - Progress Note      Patient: Ness Castañeda    Unit/Bed:2018/2018-02  YOB: 1950  MRN: [id number]   Acct: [id number]   PCP: Eri Akbar MD    Date of Service: Pt seen/examined on 03/10/25      Chief Complaint:  medical management    Assessment and Plan:-  Left Colon Sigmoid Mass adhered to the Uterus s/p Left Laprascopic Open Sigmoid Resection with Tap Block  More abd distension, xray with concern for Post Op Ileus. Now has NGT and sxms have improved.   Gen surgery following and managing. On IV antibiotics.   NGT is replaced  NGT is removed now  Repeat CT abdomen reviewed  TPN by general surgery, insulin orders and TPN per general surgery  IV fluids.  Recommended to decrease IV fluids due to peripheral edema-now on 75 mL/h total    Anemia-likely postoperative on chronic anemia  Recheck hgb. Transfuse for hgb <7  Pt is sitting at 7. If she gets symptomatic, such as low BP, Fast HR, dizziness, sob, cp, then I recommend 1 u prbcs.     CKD III  Cr is at baseline.   Avoid nephrotoxic meds  Was given Lasix 20 mg IV x 1 for peripheral edema    Pre-Diabetes  Monitor glucose.   On metformin. Ok to hold while inpt.     HTN  On norvasc, lisinopril.  Hold Norvasc secondary to peripheral edema  If bp >150, will increase norvasc to 10mg and or can increase lisinopril to 20mg qd.     Iron Deficiency  S/p iron infusions x 2   7. Fluid overload   Recommend diuresis   Nephrology consult   Noted weight gain since admission   Advised daily weight and strict I's and O's        Plan:  Continue home medicines.     Monitor for signs of bleeding.   Gen surgery primary and managing pain , dvt prophy, pt/ot.   ISS, Ac and hs protocol, hypoglycemia protocol.           Subjective:  Pt seen and examined in room.   Family at bedside  Patient has a NGT placed again on 3/4/2025.  Noticed some peripheral edema worsening.  Fluids have been reduced      History Of Present Illness:      74 y F admitted by surgery

## 2025-03-10 NOTE — PROGRESS NOTES
Physical Therapy  Facility/Department: [facility] SURG  Rehabilitation Physical Therapy Treatment Note    NAME: Ness Castañeda  : 1950 (74 y.o.)  MRN: [id number]  CODE STATUS: Full Code    Date of Service: 3/10/25       Restrictions:  Restrictions/Precautions: Fall Risk, General Precautions  Position Activity Restriction  Other Position/Activity Restrictions: RUE IV, abd binder as needed when up, alarms, OSFÍA drain,  up with assist     SUBJECTIVE  Subjective: pt in bedside chair agreeable to PT       OBJECTIVE  Cognition  Overall Cognitive Status: Exceptions  Arousal/Alertness: Appears intact  Following Commands: Follows multistep commands with repitition;Follows multistep commands with increased time  Attention Span: Appears intact  Memory: Decreased recall of precautions  Safety Judgement: Decreased awareness of need for safety;Decreased awareness of need for assistance  Problem Solving: Assistance required to identify errors made;Assistance required to correct errors made;Decreased awareness of errors;Assistance required to implement solutions  Insights: Decreased awareness of deficits  Initiation: Requires cues for some  Sequencing: Requires cues for some  Cognition Comment: Pt thankful this date for session.  Orientation  Overall Orientation Status: Within Functional Limits  Orientation Level: Oriented X4    Functional Mobility  Transfers  Surface: From chair with arms;To chair with arms;Standard toilet  Additional Factors: Verbal cues  Device: Walker  Sit to Stand  Assistance Level: Contact guard assist;Stand by assist  Stand to Sit  Assistance Level: Contact guard assist;Stand by assist  Bed To/From Chair  Technique: Stand step;Stand pivot  Assistance Level: Stand by assist;Contact guard assist  Stand Pivot  Assistance Level: Contact guard assist;Stand by assist    MAX A to don abdominal binder prior to mobility      Environmental Mobility  Ambulation  Surface: Level surface  Device: Rolling walker  Distance:  230 ft  Activity: Within Room;Within Unit  Additional Factors: Verbal cues;Hand placement cues  Assistance Level: Contact guard assist;Stand by assist  Gait Deviations: Decreased step length bilateral    Pt amb 10 ft to bathroom for toileting requires MAX A for pericare after toileting            PT Exercises  Exercise Treatment: pt demo indepence with seated LE AROM  Postural Correction Exercises: upright posture  Breathing Techniques: VCs for pursed lip breathing.    Pt requested to use bathroom again after amb in [place] again required MAX A for pericare after toileting    Pt requested to brush her teeth chairside wants to complete this task during PT session therefore assisted pt set up for brushing her teeth chairside       ASSESSMENT/PROGRESS TOWARDS GOALS  Vital Signs  BP Location: Left upper arm  O2 Device: None (Room air)    Assessment  Activity Tolerance:patient tolerated session well progressing toward goals at this time STG #5 has been met  PT amb 230 ft using RW several standing rest breaks needed due to fatigue,frequent vc's for upright posture is needed as well.  Patient tolerated treatment well;Patient limited by endurance;Patient limited by fatigue  Discharge Recommendations: Patient would benefit from continued therapy after discharge    Goals  Patient Goals   Patient Goals : Return home  Short Term Goals  Time Frame for Short Term Goals: 12 visits  Short Term Goal 1: Patient will be indep with bed mobility.  Short Term Goal 2: Patient will be indep with transfers.  Short Term Goal 3: Patient will amb 300 feet indep with RW.  Short Term Goal 4: Patient will negotiate 8 stairs with rail and supervision.  Short Term Goal 5: Patient will be indep with HEP.    PLAN OF CARE/SAFETY  Physical Therapy Plan  General Plan: 1 time a day 7 days a week  Current Treatment Recommendations: Strengthening;Balance training;Functional mobility training;Transfer training;Endurance training;Gait training;Stair

## 2025-03-10 NOTE — PROGRESS NOTES
General Surgery Progress Note            PATIENT NAME: Ness Castañeda     TODAY'S DATE: 3/10/2025, 4:48 AM    SUBJECTIVE:    Patient seen and examined at the bedside.  No acute events reported overnight.  Patient had a very restful night without any nausea or vomiting.  Patient states that she is feeling a bit hungry this morning.  Has been up and walking the halls multiple times throughout the day.    OBJECTIVE:   VITALS:  /71   Pulse 75   Temp 97.7 °F (36.5 °C)   Resp 15   Ht 1.651 m (5' 5\")   Wt 82.1 kg (181 lb)   LMP 02/24/1998   SpO2 96%   BMI 30.12 kg/m²      INTAKE/OUTPUT:      Intake/Output Summary (Last 24 hours) at 3/10/2025 044  Last data filed at 3/10/2025 0435  Gross per 24 hour   Intake --   Output 1460 ml   Net -1460 ml       PHYSICAL EXAM  GEN: Alert, awake, oriented, NAD  HEENT: normocephalic, no scleral icterus, moist mucous membranes  CV: Regular rate and rhythm  LUNG: Unlabored breathing on RA  ABDOMEN: Soft, softly distended, mildly tender to palpation near incisions.  No rebound, guarding, or rigidity.  Incisions are clean and dry with staples intact.   No active bleeding noted. SOFÍA intact with serous output.  EXTREMITIES: Bilateral lower extremity edema    Data:  CBC with Differential:    Lab Results   Component Value Date/Time    WBC 16.1 03/09/2025 05:11 AM    RBC 3.03 03/09/2025 05:11 AM    HGB 7.9 03/09/2025 05:11 AM    HCT 27.0 03/09/2025 05:11 AM     03/09/2025 05:11 AM    MCV 89.1 03/09/2025 05:11 AM    MCH 26.1 03/09/2025 05:11 AM    MCHC 29.3 03/09/2025 05:11 AM    RDW 19.6 03/09/2025 05:11 AM    LYMPHOPCT 7 03/05/2025 05:47 AM    MONOPCT 6 03/05/2025 05:47 AM    EOSPCT 2 03/05/2025 05:47 AM    BASOPCT 0 03/05/2025 05:47 AM    MONOSABS 0.93 03/05/2025 05:47 AM    LYMPHSABS 1.17 03/05/2025 05:47 AM    EOSABS 0.38 03/05/2025 05:47 AM    BASOSABS 0.04 03/05/2025 05:47 AM    DIFFTYPE NOT REPORTED 11/08/2016 11:05 AM     BMP:    Lab Results   Component Value Date/Time      03/09/2025 05:11 AM    K 3.8 03/09/2025 05:11 AM     03/09/2025 05:11 AM    CO2 26 03/09/2025 05:11 AM    BUN 16 03/09/2025 05:11 AM    CREATININE 1.2 03/09/2025 05:11 AM    CALCIUM 9.4 03/09/2025 05:11 AM    GFRAA >60 10/19/2018 08:34 AM    LABGLOM 48 03/09/2025 05:11 AM    LABGLOM 39 07/24/2023 10:14 AM    GLUCOSE 151 03/09/2025 05:11 AM       Radiology Review:        XR ABDOMEN (KUB) (SINGLE AP VIEW)   Final Result   1. Unchanged dilated loops of small bowel due to an adynamic ileus.         XR CHEST PORTABLE   Final Result   No acute airspace disease identified.      Right PICC line terminates at the level of the right atrium.         CT ABDOMEN PELVIS WO CONTRAST Additional Contrast? Oral (water soluble through NGT)   Final Result   1. Status post sigmoid colectomy with fat stranding adjacent the sigmoid   colon which could be postoperative or related to mild colitis. No free air or   focal fluid collection is identified.   2. Dilated proximal small bowel loops without an abrupt transition in   caliber.  Ileus is favored over some degree of bowel obstruction.   3. Cholelithiasis.   4. Trace bilateral pleural effusions with adjacent airspace opacities in the   lower lobes which could represent atelectasis or pneumonia.   5. 1.2 cm right adrenal adenoma.  No follow-up imaging is recommended.   6. Air in the bladder which could be related to recent instrumentation or   cystitis.         XR ABDOMEN FOR NG/OG/NE TUBE PLACEMENT   Final Result   1. Nasogastric tube terminating in the gastric body.         XR CHEST PORTABLE   Final Result   No acute airspace disease identified.         XR ABDOMEN (KUB) (SINGLE AP VIEW)   Final Result   Mildly dilated loops of small bowel in the upper abdomen could represent   partial small bowel obstruction versus ileus         XR ABDOMEN FOR NG/OG/NE TUBE PLACEMENT   Final Result   Enteric tube with the tip and side-port in the stomach.         XR ABDOMEN (KUB) (SINGLE

## 2025-03-11 LAB
ANION GAP SERPL CALCULATED.3IONS-SCNC: 8 MMOL/L (ref 9–16)
BUN SERPL-MCNC: 15 MG/DL (ref 8–23)
CALCIUM SERPL-MCNC: 9.3 MG/DL (ref 8.8–10.2)
CHLORIDE SERPL-SCNC: 108 MMOL/L (ref 98–107)
CO2 SERPL-SCNC: 25 MMOL/L (ref 20–31)
CREAT SERPL-MCNC: 1.1 MG/DL (ref 0.5–0.9)
CREAT UR-MCNC: 30.6 MG/DL (ref 28–217)
ERYTHROCYTE [DISTWIDTH] IN BLOOD BY AUTOMATED COUNT: 19.9 % (ref 11.8–14.4)
GFR, ESTIMATED: 50 ML/MIN/1.73M2
GLUCOSE BLD-MCNC: 106 MG/DL (ref 65–105)
GLUCOSE BLD-MCNC: 113 MG/DL (ref 65–105)
GLUCOSE BLD-MCNC: 113 MG/DL (ref 65–105)
GLUCOSE BLD-MCNC: 124 MG/DL (ref 65–105)
GLUCOSE BLD-MCNC: 78 MG/DL (ref 65–105)
GLUCOSE BLD-MCNC: 88 MG/DL (ref 65–105)
GLUCOSE SERPL-MCNC: 106 MG/DL (ref 82–115)
HCT VFR BLD AUTO: 27.2 % (ref 36.3–47.1)
HGB BLD-MCNC: 7.8 G/DL (ref 11.9–15.1)
MAGNESIUM SERPL-MCNC: 2 MG/DL (ref 1.6–2.4)
MCH RBC QN AUTO: 26.2 PG (ref 25.2–33.5)
MCHC RBC AUTO-ENTMCNC: 28.7 G/DL (ref 28.4–34.8)
MCV RBC AUTO: 91.3 FL (ref 82.6–102.9)
NRBC BLD-RTO: 0 PER 100 WBC
PHOSPHATE SERPL-MCNC: 3.4 MG/DL (ref 2.5–4.5)
PLATELET # BLD AUTO: 330 K/UL (ref 138–453)
PMV BLD AUTO: 10.1 FL (ref 8.1–13.5)
POTASSIUM SERPL-SCNC: 4.3 MMOL/L (ref 3.7–5.3)
RBC # BLD AUTO: 2.98 M/UL (ref 3.95–5.11)
SODIUM SERPL-SCNC: 142 MMOL/L (ref 136–145)
TOTAL PROTEIN, URINE: 8 MG/DL
URINE TOTAL PROTEIN CREATININE RATIO: 0.26 (ref 0–0.2)
WBC OTHER # BLD: 10.6 K/UL (ref 3.5–11.3)

## 2025-03-11 PROCEDURE — 82947 ASSAY GLUCOSE BLOOD QUANT: CPT

## 2025-03-11 PROCEDURE — 36415 COLL VENOUS BLD VENIPUNCTURE: CPT

## 2025-03-11 PROCEDURE — 85027 COMPLETE CBC AUTOMATED: CPT

## 2025-03-11 PROCEDURE — 84166 PROTEIN E-PHORESIS/URINE/CSF: CPT

## 2025-03-11 PROCEDURE — 84165 PROTEIN E-PHORESIS SERUM: CPT

## 2025-03-11 PROCEDURE — 83735 ASSAY OF MAGNESIUM: CPT

## 2025-03-11 PROCEDURE — 6360000002 HC RX W HCPCS

## 2025-03-11 PROCEDURE — 80048 BASIC METABOLIC PNL TOTAL CA: CPT

## 2025-03-11 PROCEDURE — 2500000003 HC RX 250 WO HCPCS: Performed by: FAMILY MEDICINE

## 2025-03-11 PROCEDURE — 82570 ASSAY OF URINE CREATININE: CPT

## 2025-03-11 PROCEDURE — 84155 ASSAY OF PROTEIN SERUM: CPT

## 2025-03-11 PROCEDURE — 84100 ASSAY OF PHOSPHORUS: CPT

## 2025-03-11 PROCEDURE — 2580000003 HC RX 258

## 2025-03-11 PROCEDURE — 86334 IMMUNOFIX E-PHORESIS SERUM: CPT

## 2025-03-11 PROCEDURE — 6360000002 HC RX W HCPCS: Performed by: INTERNAL MEDICINE

## 2025-03-11 PROCEDURE — 97535 SELF CARE MNGMENT TRAINING: CPT

## 2025-03-11 PROCEDURE — 6370000000 HC RX 637 (ALT 250 FOR IP): Performed by: STUDENT IN AN ORGANIZED HEALTH CARE EDUCATION/TRAINING PROGRAM

## 2025-03-11 PROCEDURE — 2500000003 HC RX 250 WO HCPCS: Performed by: SURGERY

## 2025-03-11 PROCEDURE — 6360000002 HC RX W HCPCS: Performed by: SURGERY

## 2025-03-11 PROCEDURE — 6370000000 HC RX 637 (ALT 250 FOR IP)

## 2025-03-11 PROCEDURE — 6370000000 HC RX 637 (ALT 250 FOR IP): Performed by: SURGERY

## 2025-03-11 PROCEDURE — 1200000000 HC SEMI PRIVATE

## 2025-03-11 PROCEDURE — 84156 ASSAY OF PROTEIN URINE: CPT

## 2025-03-11 RX ORDER — FUROSEMIDE 10 MG/ML
40 INJECTION INTRAMUSCULAR; INTRAVENOUS DAILY
Status: DISCONTINUED | OUTPATIENT
Start: 2025-03-11 | End: 2025-03-12

## 2025-03-11 RX ORDER — FUROSEMIDE 10 MG/ML
20 INJECTION INTRAMUSCULAR; INTRAVENOUS DAILY
Status: DISCONTINUED | OUTPATIENT
Start: 2025-03-11 | End: 2025-03-11

## 2025-03-11 RX ORDER — BISACODYL 5 MG/1
5 TABLET, DELAYED RELEASE ORAL 2 TIMES DAILY
Status: DISCONTINUED | OUTPATIENT
Start: 2025-03-11 | End: 2025-03-13 | Stop reason: HOSPADM

## 2025-03-11 RX ADMIN — SODIUM CHLORIDE, PRESERVATIVE FREE 10 ML: 5 INJECTION INTRAVENOUS at 15:42

## 2025-03-11 RX ADMIN — BISACODYL 5 MG: 5 TABLET, COATED ORAL at 10:27

## 2025-03-11 RX ADMIN — LISINOPRIL 10 MG: 10 TABLET ORAL at 20:26

## 2025-03-11 RX ADMIN — ENOXAPARIN SODIUM 40 MG: 100 INJECTION SUBCUTANEOUS at 10:29

## 2025-03-11 RX ADMIN — LISINOPRIL 10 MG: 10 TABLET ORAL at 10:28

## 2025-03-11 RX ADMIN — METOCLOPRAMIDE HYDROCHLORIDE 5 MG: 5 INJECTION INTRAMUSCULAR; INTRAVENOUS at 18:59

## 2025-03-11 RX ADMIN — SODIUM CHLORIDE, PRESERVATIVE FREE 10 ML: 5 INJECTION INTRAVENOUS at 19:07

## 2025-03-11 RX ADMIN — FUROSEMIDE 40 MG: 10 INJECTION, SOLUTION INTRAMUSCULAR; INTRAVENOUS at 16:46

## 2025-03-11 RX ADMIN — ACETAMINOPHEN 1000 MG: 500 TABLET ORAL at 12:59

## 2025-03-11 RX ADMIN — ACETAMINOPHEN 1000 MG: 500 TABLET ORAL at 20:25

## 2025-03-11 RX ADMIN — METOCLOPRAMIDE HYDROCHLORIDE 5 MG: 5 INJECTION INTRAMUSCULAR; INTRAVENOUS at 05:46

## 2025-03-11 RX ADMIN — PIPERACILLIN AND TAZOBACTAM 3375 MG: 3; .375 INJECTION, POWDER, LYOPHILIZED, FOR SOLUTION INTRAVENOUS at 13:19

## 2025-03-11 RX ADMIN — BISACODYL 5 MG: 5 TABLET, COATED ORAL at 20:26

## 2025-03-11 RX ADMIN — SODIUM CHLORIDE, PRESERVATIVE FREE 10 ML: 5 INJECTION INTRAVENOUS at 16:49

## 2025-03-11 RX ADMIN — METOCLOPRAMIDE HYDROCHLORIDE 5 MG: 5 INJECTION INTRAMUSCULAR; INTRAVENOUS at 12:58

## 2025-03-11 RX ADMIN — SODIUM CHLORIDE, PRESERVATIVE FREE 10 ML: 5 INJECTION INTRAVENOUS at 20:27

## 2025-03-11 RX ADMIN — FAMOTIDINE 20 MG: 20 TABLET, FILM COATED ORAL at 10:28

## 2025-03-11 RX ADMIN — PIPERACILLIN AND TAZOBACTAM 3375 MG: 3; .375 INJECTION, POWDER, LYOPHILIZED, FOR SOLUTION INTRAVENOUS at 05:45

## 2025-03-11 RX ADMIN — ACETAMINOPHEN 1000 MG: 500 TABLET ORAL at 05:45

## 2025-03-11 RX ADMIN — METOCLOPRAMIDE HYDROCHLORIDE 5 MG: 5 INJECTION INTRAMUSCULAR; INTRAVENOUS at 00:17

## 2025-03-11 RX ADMIN — PIPERACILLIN AND TAZOBACTAM 3375 MG: 3; .375 INJECTION, POWDER, LYOPHILIZED, FOR SOLUTION INTRAVENOUS at 20:25

## 2025-03-11 ASSESSMENT — PAIN SCALES - GENERAL
PAINLEVEL_OUTOF10: 1
PAINLEVEL_OUTOF10: 1
PAINLEVEL_OUTOF10: 0
PAINLEVEL_OUTOF10: 1
PAINLEVEL_OUTOF10: 1

## 2025-03-11 ASSESSMENT — PAIN DESCRIPTION - DESCRIPTORS
DESCRIPTORS: ACHING
DESCRIPTORS: ACHING
DESCRIPTORS: ACHING;DISCOMFORT

## 2025-03-11 ASSESSMENT — PAIN DESCRIPTION - ORIENTATION
ORIENTATION: MID
ORIENTATION: LEFT

## 2025-03-11 ASSESSMENT — PAIN DESCRIPTION - FREQUENCY
FREQUENCY: CONTINUOUS
FREQUENCY: CONTINUOUS

## 2025-03-11 ASSESSMENT — PAIN DESCRIPTION - LOCATION
LOCATION: ABDOMEN

## 2025-03-11 ASSESSMENT — PAIN DESCRIPTION - PAIN TYPE
TYPE: SURGICAL PAIN
TYPE: SURGICAL PAIN

## 2025-03-11 ASSESSMENT — PAIN DESCRIPTION - ONSET
ONSET: ON-GOING
ONSET: ON-GOING

## 2025-03-11 NOTE — PROGRESS NOTES
Occupational Therapy  Facility/Department: [facility] MED SURG  Daily Treatment Note  NAME: Ness Castañeda  : 1950  MRN: [id number]    Date of Service: 3/11/2025    RN reports patient is medically stable for therapy treatment this date.    Chart reviewed prior to treatment and patient is agreeable for therapy.  All lines intact and patient positioned comfortably at end of treatment.  All patient needs addressed prior to ending therapy session.      Discharge Recommendations:  Patient would benefit from continued therapy after discharge  Due to recent hospitalization and medical condition, pt would benefit from additional intermittent skilled therapy at time of discharge.  Please refer to the AM-PAC score for current functional status.   OT Equipment Recommendations  Equipment Needed: Yes  Mobility Devices: ADL Assistive Devices  ADL Assistive Devices: Reacher;Long-handled Shoe Horn;Long-handled Sponge;Emergency Alert System    Patient Diagnosis(es): The encounter diagnosis was Diverticulosis.     Assessment   Assessment: Pt tolerated session well and is slowly progressing towards goals. Pt req'd SBA during functional transfers/mobility using RW and during toileting task seated. Pt remains limited by decreased activity tolerance and ABD prec. Cont with OT POC to ensure a safe return to PLOF.  Activity Tolerance: Patient tolerated treatment well;Patient limited by endurance  Discharge Recommendations: Patient would benefit from continued therapy after discharge  Equipment Needed: Yes  Mobility Devices: ADL Assistive Devices     Plan  Occupational Therapy Plan  Times Per Week: 5x/week 1x/day as raj  Current Treatment Recommendations: Strengthening;Balance training;Functional mobility training;Endurance training;Safety education & training;Pain management;Neuromuscular re-education;Equipment evaluation, education, & procurement;Self-Care / ADL;Patient/Caregiver education & training;Home management

## 2025-03-11 NOTE — CONSULTS
reviewed and were negative.  Currently the patient is on TPN, she is tolerating p.o. intake, she denies shortness of breath, no chest pain, no nausea, vomiting or diarrhea, no dysuria or hematuria    Objective:  CURRENT TEMPERATURE:  Temp: 97.5 °F (36.4 °C)  MAXIMUM TEMPERATURE OVER 24HRS:  Temp (24hrs), Av.8 °F (36.6 °C), Min:97.3 °F (36.3 °C), Max:98.2 °F (36.8 °C)    CURRENT RESPIRATORY RATE:  Respirations: 17  CURRENT PULSE:  Pulse: 73  CURRENT BLOOD PRESSURE:  BP: 132/69  24HR BLOOD PRESSURE RANGE:  Systolic (24hrs), Av , Min:132 , Max:151   ; Diastolic (24hrs), Av, Min:63, Max:77    24HR INTAKE/OUTPUT:    Intake/Output Summary (Last 24 hours) at 3/11/2025 1328  Last data filed at 3/11/2025 1306  Gross per 24 hour   Intake 91919.31 ml   Output 4260 ml   Net 6034.31 ml       Physical Exam:  Awake, alert, in no acute distress  Skin: warm and dry, no rash or erythema  Eyes: conjunctivae normal and sclera anicteric  ENT: no thrush no pharyngeal congestion  orodental hygiene good  Neck: carotids without bruits bilaterally JVD: None Lymphadenopathty:none   Pulmonary: clear to auscultation bilaterally- no wheezes, rales or rhonchi, normal air movement, no respiratory distress  Cardiovascular: Normal S1 & S2  Abdomen: soft nontender, bowel sounds present, no organomegaly,  no ascites  Extremities: 3 + edema-  bilateral     CBC:  Recent Labs     25  0511 03/10/25  0529 25  0550   WBC 16.1* 10.3 10.6   RBC 3.03* 2.75* 2.98*   HGB 7.9* 7.3* 7.8*   HCT 27.0* 25.1* 27.2*   MCV 89.1 91.3 91.3   MCH 26.1 26.5 26.2   MCHC 29.3 29.1 28.7   RDW 19.6* 19.9* 19.9*    324 330   MPV 9.6 9.9 10.1     Chemistry:  Recent Labs     25  0511 03/10/25  0529 25  0550    141 142   K 3.8 3.8 4.3    109* 108*   CO2 26 24 25   GLUCOSE 151* 88 106   BUN 16 14 15   CREATININE 1.2* 1.1* 1.1*   MG 1.9 1.8 2.0   ANIONGAP 9 8* 8*   LABGLOM 48* 53* 50*   CALCIUM 9.4 8.9 9.3   PHOS 2.8 2.8 3.4      Recent Labs     03/10/25  0529   AST 23   ALT 14   ALKPHOS 73   BILITOT <0.2   BILIDIR <0.1       IRON:    Lab Results   Component Value Date/Time    IRON 11 02/18/2025 03:15 PM       TIBC:    Lab Results   Component Value Date/Time    TIBC 208 02/18/2025 03:15 PM     FERRITIN:    Lab Results   Component Value Date/Time    FERRITIN 52 02/18/2025 03:15 PM       Phosphorus:    Lab Results   Component Value Date/Time    PHOS 3.4 03/11/2025 05:50 AM     Magnesium:   Lab Results   Component Value Date/Time    MG 2.0 03/11/2025 05:50 AM       Urine Protein:    Lab Results   Component Value Date/Time    PROTEINU NEGATIVE 03/09/2025 08:31 AM       Radiology:  No results found.      Assessment:  CKD stage III, creatinine at baseline  Status post right nephrectomy  Volume overload   Anemia   Diverticulosis, abscess cavity with associated dense adhesions and inflammation s/p robotic assisted open sigmoid resection 2/26/2025  Prediabetes  Hypertension  1.2 cm right adrenal adenoma     Plan:  1.  Start Lasix 40 mg IV daily  2.  Check urine protein  3.  Paraprotein level    Thank you for the consultation.  Please do not hesitate to call with questions.    Electronically signed by KAREN SALGADO MD on 3/11/2025 at 1:28 PM

## 2025-03-11 NOTE — PROGRESS NOTES
Hospitalist - Progress Note      Patient: Ness Castañeda    Unit/Bed:2018/2018-02  YOB: 1950  MRN: [id number]   Acct: [id number]   PCP: Eri Akbar MD    Date of Service: Pt seen/examined on 03/11/25      Chief Complaint:  medical management    Assessment and Plan:-  Left Colon Sigmoid Mass adhered to the Uterus s/p Left Laprascopic Open Sigmoid Resection with Tap Block  More abd distension, xray with concern for Post Op Ileus. Now has NGT and sxms have improved.   Gen surgery following and managing. On IV antibiotics.   NGT is replaced  NGT is removed now  Repeat CT abdomen reviewed  TPN by general surgery, insulin orders and TPN per general surgery  IV fluids.  Recommended to decrease IV fluids due to peripheral edema-now on 75 mL/h total    Anemia-likely postoperative on chronic anemia  Recheck hgb. Transfuse for hgb <7  Pt is sitting at 7. If she gets symptomatic, such as low BP, Fast HR, dizziness, sob, cp, then I recommend 1 u prbcs.     CKD III  Cr is at baseline.   Avoid nephrotoxic meds  Was given Lasix 20 mg IV x 1 for peripheral edema  Fluid overload  Continue 40 mg IV daily    Pre-Diabetes  Monitor glucose.   On metformin. Ok to hold while inpt.     HTN  On norvasc, lisinopril.  Hold Norvasc secondary to peripheral edema  If bp >150, will increase norvasc to 10mg and or can increase lisinopril to 20mg qd.     Iron Deficiency  S/p iron infusions x 2     7. Fluid overload- improved   Recommend diuresis   Nephrology consult   Noted weight gain since admission   Advised daily weight and strict I's and O's        Plan:  Continue home medicines.   Monitor for signs of bleeding.   Gen surgery primary and managing pain , dvt prophy, pt/ot.   ISS, Ac and hs protocol, hypoglycemia protocol.           Subjective:  Pt seen and examined in room.   Feels better  Patient has a NGT placed again on 3/4/2025.  peripheral edema improved.  Fluids have been discontinued      History Of Present

## 2025-03-11 NOTE — PROGRESS NOTES
General Surgery Progress Note            PATIENT NAME: Ness Castañeda     TODAY'S DATE: 3/11/2025, 4:38 AM    SUBJECTIVE:    Patient seen and examined at the bedside.  No acute events reported overnight.  Patient had a very restful night without any nausea or vomiting.  Patient states that she is feeling a bit hungry this morning.  Has been up and walking the halls multiple times throughout the day.  She continues to pass flatus however reports that she was not able to have a bowel movement yesterday.    OBJECTIVE:   VITALS:  /66   Pulse 70   Temp 98.2 °F (36.8 °C) (Oral)   Resp 16   Ht 1.651 m (5' 5\")   Wt 85.1 kg (187 lb 9.6 oz)   LMP 02/24/1998   SpO2 100%   BMI 31.22 kg/m²      INTAKE/OUTPUT:      Intake/Output Summary (Last 24 hours) at 3/11/2025 0438  Last data filed at 3/11/2025 0358  Gross per 24 hour   Intake 8587.3 ml   Output 4110 ml   Net 4477.3 ml       PHYSICAL EXAM  GEN: Alert, awake, oriented, NAD  HEENT: normocephalic, no scleral icterus, moist mucous membranes  CV: Regular rate and rhythm  LUNG: Unlabored breathing on RA  ABDOMEN: Soft, softly distended, mildly tender to palpation near incisions.  No rebound, guarding, or rigidity.  Incisions are clean and dry with staples intact.   No active bleeding noted. SOFÍA intact with serous output.  EXTREMITIES: Bilateral lower extremity edema    Data:  CBC with Differential:    Lab Results   Component Value Date/Time    WBC 10.3 03/10/2025 05:29 AM    RBC 2.75 03/10/2025 05:29 AM    HGB 7.3 03/10/2025 05:29 AM    HCT 25.1 03/10/2025 05:29 AM     03/10/2025 05:29 AM    MCV 91.3 03/10/2025 05:29 AM    MCH 26.5 03/10/2025 05:29 AM    MCHC 29.1 03/10/2025 05:29 AM    RDW 19.9 03/10/2025 05:29 AM    LYMPHOPCT 7 03/05/2025 05:47 AM    MONOPCT 6 03/05/2025 05:47 AM    EOSPCT 2 03/05/2025 05:47 AM    BASOPCT 0 03/05/2025 05:47 AM    MONOSABS 0.93 03/05/2025 05:47 AM    LYMPHSABS 1.17 03/05/2025 05:47 AM    EOSABS 0.38 03/05/2025 05:47 AM     NG/OG/NE TUBE PLACEMENT   Final Result   Enteric tube with the tip and side-port in the stomach.         XR ABDOMEN (KUB) (SINGLE AP VIEW)   Final Result   1. Gas-filled mildly distended large and small bowel loops in the mid   abdomen. No gas in the rectum. Findings most likely represent postoperative   ileus.  Suggest follow-up KUB as per clinical.   2. Rounded soft tissue density right transverse colon, possibly a polyp or   other soft tissue density.             ASSESSMENT   74 year old female s/p robotic assisted open sigmoid resection 2/26/2025  Pathology: Diverticulosis, abscess cavity with associated dense adhesions and inflammation    Plan  Patient seen and examined  Diet: Will advance to GI soft diet   Antiemetics: As needed, added Reglan  Leukocytosis: Stable 10.6 from 10.3 yesterday  Continue to monitor bowel function added Dulcolax twice daily  Strict I's and O's  Continue IV antibiotics, Zosyn  Continue daily wound care.  RN order placed  Encourage incentive spirometer use  Encourage OOB and ambulation, work with PT/OT      Cookie Westfall DO 03/11/25  4:38 AM   General Surgery PGY 1     Attending Physician Statement  I have discussed the case, including pertinent history and exam findings with the resident. I agree with the assessment, plan and orders as documented by the resident.     Improving  Advance diet

## 2025-03-11 NOTE — PLAN OF CARE
Problem: Discharge Planning  Goal: Discharge to home or other facility with appropriate resources  Outcome: Progressing     Problem: Safety - Adult  Goal: Free from fall injury  3/11/2025 1433 by Edith Mayo RN  Outcome: Progressing  3/11/2025 0245 by Sarah Trivedi RN  Outcome: Progressing  Flowsheets (Taken 3/11/2025 0245)  Free From Fall Injury: Instruct family/caregiver on patient safety     Problem: Chronic Conditions and Co-morbidities  Goal: Patient's chronic conditions and co-morbidity symptoms are monitored and maintained or improved  Outcome: Progressing     Problem: ABCDS Injury Assessment  Goal: Absence of physical injury  Outcome: Progressing     Problem: Nutrition Deficit:  Goal: Optimize nutritional status  3/11/2025 0245 by Sarah Trivedi RN  Outcome: Progressing  Flowsheets  Taken 3/11/2025 0245 by Sarah Trivedi RN  Nutrient intake appropriate for improving, restoring, or maintaining nutritional needs:   Assess nutritional status and recommend course of action   Recommend, monitor, and adjust tube feedings and TPN/PPN based on assessed needs   Monitor oral intake, labs, and treatment plans   Provide specific nutrition education to patient or family as appropriate  Taken 3/10/2025 1721 by Nadja Gonzalez RD, LD  Nutrient intake appropriate for improving, restoring, or maintaining nutritional needs:   Assess nutritional status and recommend course of action   Recommend, monitor, and adjust tube feedings and TPN/PPN based on assessed needs     Problem: Skin/Tissue Integrity - Adult  Goal: Skin integrity remains intact  Description: 1.  Monitor for areas of redness and/or skin breakdown  2.  Assess vascular access sites hourly  3.  Every 4-6 hours minimum:  Change oxygen saturation probe site  4.  Every 4-6 hours:  If on nasal continuous positive airway pressure, respiratory therapy assess nares and determine need for appliance change or resting period  3/11/2025 1433 by Maria Esther  infection  Recent Flowsheet Documentation  Taken 3/11/2025 0245 by Sarah Trivedi RN  Incisions, Wounds, or Drain Sites Healing Without Sign and Symptoms of Infection:   TWICE DAILY: Assess and document skin integrity   TWICE DAILY: Assess and document dressing/incision, wound bed, drain sites and surrounding tissue   Implement wound care per orders     Problem: Gastrointestinal - Adult  Goal: Minimal or absence of nausea and vomiting  Recent Flowsheet Documentation  Taken 3/11/2025 0245 by Sarah Trivedi RN  Minimal or absence of nausea and vomiting:   Administer IV fluids as ordered to ensure adequate hydration   Provide nonpharmacologic comfort measures as appropriate   Administer ordered antiemetic medications as needed   Advance diet as tolerated, if ordered  Goal: Maintains adequate nutritional intake  Recent Flowsheet Documentation  Taken 3/11/2025 0245 by Sarah Trivedi RN  Maintains adequate nutritional intake:   Monitor percentage of each meal consumed   Monitor intake and output, weight and lab values   Identify factors contributing to decreased intake, treat as appropriate     Problem: Nutrition Deficit:  Goal: Optimize nutritional status  Recent Flowsheet Documentation  Taken 3/11/2025 0245 by Sarah Trivedi RN  Nutrient intake appropriate for improving, restoring, or maintaining nutritional needs:   Assess nutritional status and recommend course of action   Recommend, monitor, and adjust tube feedings and TPN/PPN based on assessed needs   Monitor oral intake, labs, and treatment plans   Provide specific nutrition education to patient or family as appropriate

## 2025-03-11 NOTE — PROGRESS NOTES
Nutrition Assessment     Type and Reason for Visit: Reassess    Nutrition Recommendations/Plan:   ADULT DIET; Easy to Chew  Ensure Clear 2x/day  Monitor p.o intakes. GI function and labs     Malnutrition Assessment:  Malnutrition Status: At risk for malnutrition    Nutrition Assessment:  Patient's diet advanced to Easy to Chew for GI purposes. Patient consumed 50% of breakfast which was tolerated. Patient has increased edema and there is concern for fluid overload. Surgeon agreed that TPN can be weaned off. RN directed to cut current TPN rate in half to 30 mL/hr and run for 4 hours and depending on dinner intake decrease rate again to 15 mL/hr for a few hours before stopping the bag. Continue current diet and Ensure Clear 2x/day. Monitor p.o intakes, GI function and labs.    Estimated Daily Nutrient Needs:  Energy (kcal):  2283-4083 kcal (20-25 kcal/kg) Weight Used for Energy Requirements: Admission     Protein (g):  74-91 gm of protein (1.3-1.6 gm/kg) Weight Used for Protein Requirements: Ideal        Fluid (ml/day):  5971-0556 mL Method Used for Fluid Requirements: 1 ml/kcal    Nutrition Related Findings:   Edema: +1 pitting BUE, +1 pitting BLE.  Hypoactive bowel sounds. Weaned off TPN (3/11) Wound Type: Surgical Incision    Current Nutrition Therapies:    PN-Adult 2-in-1 Central Line (Custom)  ADULT ORAL NUTRITION SUPPLEMENT; Breakfast, Dinner; Clear Liquid Oral Supplement  ADULT DIET; Easy to Chew    Anthropometric Measures:  Height: 165.1 cm (5' 5\")  Current Body Wt: 85.1 kg (187 lb 9.8 oz)   BMI: 31.2        Nutrition Diagnosis:   Inadequate oral intake related to inadequate protein-energy intake, altered GI function as evidenced by GI abnormality, s/p surgery, intake 26-50%    Nutrition Interventions:   Food and/or Nutrient Delivery: Continue Current Diet, Discontinue Parenteral Nutrition, Continue Oral Nutrition Supplement  Nutrition Education/Counseling: Education/Counseling not indicated  Coordination of  Nutrition Care: Continue to monitor while inpatient       Goals:  Goals: PO intake 75% or greater  Type of Goal: New goal  Previous Goal Met: New Goal    Nutrition Monitoring and Evaluation:      Food/Nutrient Intake Outcomes: Food and Nutrient Intake, Supplement Intake  Physical Signs/Symptoms Outcomes: Biochemical Data    Discharge Planning:    Continue current diet, Continue Oral Nutrition Supplement         Nadja PEREZ, RDN, LDN  Lead Clinical Dietitian  RD Office Phone [phone]

## 2025-03-11 NOTE — PLAN OF CARE
Problem: Pain  Goal: Verbalizes/displays adequate comfort level or baseline comfort level  Outcome: Progressing  Flowsheets (Taken 3/11/2025 0245)  Verbalizes/displays adequate comfort level or baseline comfort level:   Encourage patient to monitor pain and request assistance   Administer analgesics based on type and severity of pain and evaluate response   Assess pain using appropriate pain scale   Implement non-pharmacological measures as appropriate and evaluate response     Problem: Safety - Adult  Goal: Free from fall injury  Outcome: Progressing  Flowsheets (Taken 3/11/2025 0245)  Free From Fall Injury: Instruct family/caregiver on patient safety     Problem: Nutrition Deficit:  Goal: Optimize nutritional status  Outcome: Progressing  Flowsheets  Taken 3/11/2025 0245 by Sarah Trivedi, RN  Nutrient intake appropriate for improving, restoring, or maintaining nutritional needs:   Assess nutritional status and recommend course of action   Recommend, monitor, and adjust tube feedings and TPN/PPN based on assessed needs   Monitor oral intake, labs, and treatment plans   Provide specific nutrition education to patient or family as appropriate  Taken 3/10/2025 1721 by Nadja Gonzalez, RD, LD  Nutrient intake appropriate for improving, restoring, or maintaining nutritional needs:   Assess nutritional status and recommend course of action   Recommend, monitor, and adjust tube feedings and TPN/PPN based on assessed needs     Problem: Skin/Tissue Integrity - Adult  Goal: Skin integrity remains intact  Description: 1.  Monitor for areas of redness and/or skin breakdown  2.  Assess vascular access sites hourly  3.  Every 4-6 hours minimum:  Change oxygen saturation probe site  4.  Every 4-6 hours:  If on nasal continuous positive airway pressure, respiratory therapy assess nares and determine need for appliance change or resting period  Outcome: Progressing  Flowsheets  Taken 3/11/2025 0245  Skin Integrity Remains  dressing/incision, wound bed, drain sites and surrounding tissue   Implement wound care per orders  Taken 3/10/2025 2010 by Sarah Trivedi RN  Incisions, Wounds, or Drain Sites Healing Without Sign and Symptoms of Infection:   TWICE DAILY: Assess and document dressing/incision, wound bed, drain sites and surrounding tissue   TWICE DAILY: Assess and document skin integrity   Implement wound care per orders     Problem: Gastrointestinal - Adult  Goal: Minimal or absence of nausea and vomiting  Recent Flowsheet Documentation  Taken 3/11/2025 0245 by Sarah Trivedi RN  Minimal or absence of nausea and vomiting:   Administer IV fluids as ordered to ensure adequate hydration   Provide nonpharmacologic comfort measures as appropriate   Administer ordered antiemetic medications as needed   Advance diet as tolerated, if ordered     Problem: Gastrointestinal - Adult  Goal: Maintains adequate nutritional intake  Recent Flowsheet Documentation  Taken 3/11/2025 0245 by Sarah Trivedi RN  Maintains adequate nutritional intake:   Monitor percentage of each meal consumed   Monitor intake and output, weight and lab values   Identify factors contributing to decreased intake, treat as appropriate     Problem: Nutrition Deficit:  Goal: Optimize nutritional status  Recent Flowsheet Documentation  Taken 3/11/2025 0245 by Sarah Trivedi, RN  Nutrient intake appropriate for improving, restoring, or maintaining nutritional needs:   Assess nutritional status and recommend course of action   Recommend, monitor, and adjust tube feedings and TPN/PPN based on assessed needs   Monitor oral intake, labs, and treatment plans   Provide specific nutrition education to patient or family as appropriate  Taken 3/10/2025 1721 by Nadja Gonzalez RD, LD  Nutrient intake appropriate for improving, restoring, or maintaining nutritional needs:   Assess nutritional status and recommend course of action   Recommend, monitor, and adjust tube feedings and

## 2025-03-12 LAB
ALBUMIN PERCENT: 50 % (ref 56–66)
ALBUMIN SERPL-MCNC: 2.4 G/DL (ref 3.2–5.2)
ALBUMIN SERPL-MCNC: 2.7 G/DL (ref 3.5–5.2)
ALBUMIN/GLOB SERPL: 1.1 {RATIO} (ref 1–2.5)
ALP SERPL-CCNC: 80 U/L (ref 35–104)
ALPHA 2 PERCENT: 17 % (ref 7–12)
ALPHA1 GLOB SERPL ELPH-MCNC: 0.4 G/DL (ref 0.1–0.4)
ALPHA1 GLOB SERPL ELPH-MCNC: 9 % (ref 3–5)
ALPHA2 GLOB SERPL ELPH-MCNC: 0.8 G/DL (ref 0.5–0.9)
ALT SERPL-CCNC: 9 U/L (ref 10–35)
ANION GAP SERPL CALCULATED.3IONS-SCNC: 8 MMOL/L (ref 9–16)
AST SERPL-CCNC: 13 U/L (ref 10–35)
B-GLOBULIN SERPL ELPH-MCNC: 0.6 G/DL (ref 0.7–1.4)
B-GLOBULIN SERPL ELPH-MCNC: 12 % (ref 8–13)
BILIRUB DIRECT SERPL-MCNC: <0.1 MG/DL (ref 0–0.2)
BILIRUB INDIRECT SERPL-MCNC: ABNORMAL MG/DL
BILIRUB SERPL-MCNC: <0.2 MG/DL (ref 0–1.2)
BUN SERPL-MCNC: 16 MG/DL (ref 8–23)
CALCIUM SERPL-MCNC: 9.2 MG/DL (ref 8.8–10.2)
CHLORIDE SERPL-SCNC: 108 MMOL/L (ref 98–107)
CO2 SERPL-SCNC: 25 MMOL/L (ref 20–31)
CREAT SERPL-MCNC: 1.4 MG/DL (ref 0.5–0.9)
ERYTHROCYTE [DISTWIDTH] IN BLOOD BY AUTOMATED COUNT: 20.4 % (ref 11.8–14.4)
GAMMA GLOB SERPL ELPH-MCNC: 0.6 G/DL (ref 0.5–1.5)
GAMMA GLOBULIN %: 12 % (ref 11–19)
GFR, ESTIMATED: 41 ML/MIN/1.73M2
GLUCOSE BLD-MCNC: 104 MG/DL (ref 65–105)
GLUCOSE BLD-MCNC: 106 MG/DL (ref 65–105)
GLUCOSE BLD-MCNC: 83 MG/DL (ref 65–105)
GLUCOSE BLD-MCNC: 99 MG/DL (ref 65–105)
GLUCOSE SERPL-MCNC: 81 MG/DL (ref 82–115)
HCT VFR BLD AUTO: 26.2 % (ref 36.3–47.1)
HGB BLD-MCNC: 7.6 G/DL (ref 11.9–15.1)
ITYP INTERPRETATION: NORMAL
MAGNESIUM SERPL-MCNC: 1.9 MG/DL (ref 1.6–2.4)
MCH RBC QN AUTO: 26.6 PG (ref 25.2–33.5)
MCHC RBC AUTO-ENTMCNC: 29 G/DL (ref 28.4–34.8)
MCV RBC AUTO: 91.6 FL (ref 82.6–102.9)
NRBC BLD-RTO: 0 PER 100 WBC
P E INTERPRETATION, U: NORMAL
PATH REV: NORMAL
PATHOLOGIST: ABNORMAL
PATHOLOGIST: NORMAL
PHOSPHATE SERPL-MCNC: 3.6 MG/DL (ref 2.5–4.5)
PLATELET # BLD AUTO: 293 K/UL (ref 138–453)
PMV BLD AUTO: 10.3 FL (ref 8.1–13.5)
POTASSIUM SERPL-SCNC: 4.3 MMOL/L (ref 3.7–5.3)
PROT PATTERN SERPL ELPH-IMP: ABNORMAL
PROT SERPL-MCNC: 4.7 G/DL (ref 6.6–8.7)
PROT SERPL-MCNC: 5 G/DL (ref 6.6–8.7)
RBC # BLD AUTO: 2.86 M/UL (ref 3.95–5.11)
SODIUM SERPL-SCNC: 141 MMOL/L (ref 136–145)
TOTAL PROT. SUM,%: 100 % (ref 98–102)
TOTAL PROT. SUM: 4.8 G/DL (ref 6.3–8.2)
URINE TOTAL PROTEIN: 7 MG/DL
WBC OTHER # BLD: 7.9 K/UL (ref 3.5–11.3)

## 2025-03-12 PROCEDURE — 84100 ASSAY OF PHOSPHORUS: CPT

## 2025-03-12 PROCEDURE — 6360000002 HC RX W HCPCS: Performed by: INTERNAL MEDICINE

## 2025-03-12 PROCEDURE — 2580000003 HC RX 258

## 2025-03-12 PROCEDURE — 97535 SELF CARE MNGMENT TRAINING: CPT

## 2025-03-12 PROCEDURE — 6360000002 HC RX W HCPCS

## 2025-03-12 PROCEDURE — 6370000000 HC RX 637 (ALT 250 FOR IP)

## 2025-03-12 PROCEDURE — 97116 GAIT TRAINING THERAPY: CPT

## 2025-03-12 PROCEDURE — 80048 BASIC METABOLIC PNL TOTAL CA: CPT

## 2025-03-12 PROCEDURE — 6370000000 HC RX 637 (ALT 250 FOR IP): Performed by: STUDENT IN AN ORGANIZED HEALTH CARE EDUCATION/TRAINING PROGRAM

## 2025-03-12 PROCEDURE — 1200000000 HC SEMI PRIVATE

## 2025-03-12 PROCEDURE — 85027 COMPLETE CBC AUTOMATED: CPT

## 2025-03-12 PROCEDURE — 36415 COLL VENOUS BLD VENIPUNCTURE: CPT

## 2025-03-12 PROCEDURE — 97530 THERAPEUTIC ACTIVITIES: CPT

## 2025-03-12 PROCEDURE — 6370000000 HC RX 637 (ALT 250 FOR IP): Performed by: SURGERY

## 2025-03-12 PROCEDURE — 6360000002 HC RX W HCPCS: Performed by: SURGERY

## 2025-03-12 PROCEDURE — 82947 ASSAY GLUCOSE BLOOD QUANT: CPT

## 2025-03-12 PROCEDURE — 80076 HEPATIC FUNCTION PANEL: CPT

## 2025-03-12 PROCEDURE — 83735 ASSAY OF MAGNESIUM: CPT

## 2025-03-12 PROCEDURE — 97110 THERAPEUTIC EXERCISES: CPT

## 2025-03-12 PROCEDURE — 2500000003 HC RX 250 WO HCPCS: Performed by: SURGERY

## 2025-03-12 RX ADMIN — BISACODYL 5 MG: 5 TABLET, COATED ORAL at 07:48

## 2025-03-12 RX ADMIN — PIPERACILLIN AND TAZOBACTAM 3375 MG: 3; .375 INJECTION, POWDER, LYOPHILIZED, FOR SOLUTION INTRAVENOUS at 04:53

## 2025-03-12 RX ADMIN — FUROSEMIDE 40 MG: 10 INJECTION, SOLUTION INTRAMUSCULAR; INTRAVENOUS at 07:49

## 2025-03-12 RX ADMIN — PIPERACILLIN AND TAZOBACTAM 3375 MG: 3; .375 INJECTION, POWDER, LYOPHILIZED, FOR SOLUTION INTRAVENOUS at 20:43

## 2025-03-12 RX ADMIN — SODIUM CHLORIDE, PRESERVATIVE FREE 10 ML: 5 INJECTION INTRAVENOUS at 08:08

## 2025-03-12 RX ADMIN — LISINOPRIL 10 MG: 10 TABLET ORAL at 20:26

## 2025-03-12 RX ADMIN — ENOXAPARIN SODIUM 40 MG: 100 INJECTION SUBCUTANEOUS at 07:48

## 2025-03-12 RX ADMIN — METOCLOPRAMIDE HYDROCHLORIDE 5 MG: 5 INJECTION INTRAMUSCULAR; INTRAVENOUS at 01:53

## 2025-03-12 RX ADMIN — METOCLOPRAMIDE HYDROCHLORIDE 5 MG: 5 INJECTION INTRAMUSCULAR; INTRAVENOUS at 23:51

## 2025-03-12 RX ADMIN — LISINOPRIL 10 MG: 10 TABLET ORAL at 07:51

## 2025-03-12 RX ADMIN — METOCLOPRAMIDE HYDROCHLORIDE 5 MG: 5 INJECTION INTRAMUSCULAR; INTRAVENOUS at 18:45

## 2025-03-12 RX ADMIN — ACETAMINOPHEN 1000 MG: 500 TABLET ORAL at 20:26

## 2025-03-12 RX ADMIN — METOCLOPRAMIDE HYDROCHLORIDE 5 MG: 5 INJECTION INTRAMUSCULAR; INTRAVENOUS at 12:37

## 2025-03-12 RX ADMIN — SODIUM CHLORIDE, PRESERVATIVE FREE 10 ML: 5 INJECTION INTRAVENOUS at 23:51

## 2025-03-12 RX ADMIN — METOCLOPRAMIDE HYDROCHLORIDE 5 MG: 5 INJECTION INTRAMUSCULAR; INTRAVENOUS at 05:53

## 2025-03-12 RX ADMIN — FAMOTIDINE 20 MG: 20 TABLET, FILM COATED ORAL at 07:49

## 2025-03-12 RX ADMIN — ACETAMINOPHEN 1000 MG: 500 TABLET ORAL at 14:44

## 2025-03-12 RX ADMIN — PIPERACILLIN AND TAZOBACTAM 3375 MG: 3; .375 INJECTION, POWDER, LYOPHILIZED, FOR SOLUTION INTRAVENOUS at 12:40

## 2025-03-12 RX ADMIN — BISACODYL 5 MG: 5 TABLET, COATED ORAL at 20:26

## 2025-03-12 RX ADMIN — ACETAMINOPHEN 1000 MG: 500 TABLET ORAL at 05:53

## 2025-03-12 ASSESSMENT — PAIN DESCRIPTION - LOCATION
LOCATION: GENERALIZED
LOCATION: KNEE

## 2025-03-12 ASSESSMENT — PAIN SCALES - GENERAL
PAINLEVEL_OUTOF10: 0
PAINLEVEL_OUTOF10: 2
PAINLEVEL_OUTOF10: 2

## 2025-03-12 ASSESSMENT — PAIN DESCRIPTION - DESCRIPTORS
DESCRIPTORS: ACHING;DISCOMFORT
DESCRIPTORS: DISCOMFORT

## 2025-03-12 ASSESSMENT — PAIN DESCRIPTION - ORIENTATION
ORIENTATION: RIGHT;LEFT
ORIENTATION: MID

## 2025-03-12 ASSESSMENT — PAIN - FUNCTIONAL ASSESSMENT: PAIN_FUNCTIONAL_ASSESSMENT: ACTIVITIES ARE NOT PREVENTED

## 2025-03-12 NOTE — PROGRESS NOTES
Occupational Therapy  Facility/Department: [facility]  Rehabilitation Occupational Therapy Daily Treatment Note    Date: 3/12/25  Patient Name: Ness Castañeda       Room:   MRN: [id number]  Account: [id number]   : 1950  (74 y.o.) Gender: female                    Past Medical History:  has a past medical history of Arthritis, Bladder tumor, Blood in urine, Cancer (HCC), Cancer (HCC), Dental crowns present, Diabetes mellitus (HCC), Diverticulitis, Hypertension, Irregular heart beat, Kidney disease, Prediabetes, Skin cancer, and Wears glasses.  Past Surgical History:   has a past surgical history that includes bladder tumor excision (2017); Colonoscopy (); Dilation and curettage of uterus (); Tonsillectomy (); partial nephrectomy (Right, 2017); partial nephrectomy (Right, 2017); Esophagogastroduodenoscopy; skin biopsy; Skin cancer excision; and Sigmoid Colectomy (N/A, 2025).    Restrictions  Restrictions/Precautions: Fall Risk, General Precautions, Surgical Protocols  Other Position/Activity Restrictions: RUE IV, abd binder as needed when up,  up with assist  Required Braces or Orthoses  Other: Abdominal Binder  Required Braces or Orthoses?: Yes    Subjective  Subjective: Pt in bedside chair upon arrival and agreeable to therapy.  Restrictions/Precautions: Fall Risk;General Precautions;Surgical Protocols             Objective     Cognition  Overall Cognitive Status: WFL  Orientation  Overall Orientation Status: Within Normal Limits   Perception  Overall Perceptual Status: WFL     ADL  Putting On/Taking Off Footwear  Assistance Level: Maximum assistance  Skilled Clinical Factors: seated to pull up and adjust socks  Grooming: SBA standing at sink with RW to complete hand hygiene, writer managed IV.  Toileting  Assistance Level: Set-up;Verbal cues;Increased time to complete;Stand by assist  Skilled Clinical Factors: IND with hygiene, SBA with RW during gown/brief  mgmt, writer managed IV.  Toilet Transfers  Technique: Stand step  Additional Factors: Set-up  Assistance Level: Stand by assist  Skilled Clinical Factors: using RW, writer managed IV.          Functional Mobility  Device: Rolling walker  Activity: To/From bathroom (mobility in room and hallway)  Skilled Clinical Factors: Pt completed functional mobility using RW with SBA and VCs for upright posture,  IV awareness and overall safety. Pt req'd many standing rest breaks during mobility. NO LOB noted. Writer managed IV.  Transfers  Surface: From chair with arms;Standard toilet;To chair with arms  Additional Factors: Set-up;Verbal cues  Device: Walker  Sit to Stand  Assistance Level: Stand by assist  Stand to Sit  Assistance Level: Stand by assist  Skilled Clinical Factors: VCs for safety, line awareness (writer managed IV) and walker mgmt.   Neuromuscular Education  Neuromuscular education: Yes  NDT Treatment: Gait ;Sitting;Standing     Assessment  Assessment  Assessment: Pt tolerated session well and is progressing towards goals. Pt req'd SBA during functional transfers/mobility using RW and during toileting task. Pt remains limited by decreased activity tolerance and ABD prec. Cont with OT POC to ensure a safe return to PLOF.  Activity Tolerance: Patient tolerated treatment well;Patient limited by endurance  Discharge Recommendations: Patient would benefit from continued therapy after discharge  Safety Devices  Safety Devices in place: Yes  Type of devices: Left in chair;Nurse notified;Call light within reach    Patient Education  Education  Education Given To: Patient  Education Provided: Mobility Training;Fall Prevention Strategies;Transfer Training;Energy Conservation;Precautions;Safety;ADL Function;Equipment  Education Method: Verbal;Teach Back  Barriers to Learning: None  Education Outcome: Verbalized understanding;Demonstrated understanding    Plan  Occupational Therapy Plan  Times Per Week: 5x/week 1x/day as

## 2025-03-12 NOTE — PLAN OF CARE
Pt progressing nicely. Walking the luther with her daughters. Several small soft bowel movements. No c/o pain. Passing gas. Incisions look CDI, staples in place. SOFÍA drain removed by Dr. Barksdale this morning. Good UOP. Seen by nephrology, who d/c the IV lasix. Swelling still +1 and pitting, but it is resolving. Lungs clear and diminished. Probably d/c tomorrow.      Problem: Discharge Planning  Goal: Discharge to home or other facility with appropriate resources  3/12/2025 1410 by Leida Marina RN  Outcome: Progressing  Flowsheets (Taken 3/12/2025 0746)  Discharge to home or other facility with appropriate resources:   Identify barriers to discharge with patient and caregiver   Arrange for needed discharge resources and transportation as appropriate   Identify discharge learning needs (meds, wound care, etc)     Problem: Pain  Goal: Verbalizes/displays adequate comfort level or baseline comfort level  3/12/2025 1410 by Leida Marina RN  Outcome: Progressing     Problem: Safety - Adult  Goal: Free from fall injury  3/12/2025 1410 by Leida Marina RN  Outcome: Progressing  Flowsheets (Taken 3/12/2025 1014)  Free From Fall Injury: Instruct family/caregiver on patient safety     Problem: Chronic Conditions and Co-morbidities  Goal: Patient's chronic conditions and co-morbidity symptoms are monitored and maintained or improved  3/12/2025 1410 by Leida Marina RN  Outcome: Progressing  Flowsheets (Taken 3/12/2025 0746)  Care Plan - Patient's Chronic Conditions and Co-Morbidity Symptoms are Monitored and Maintained or Improved:   Monitor and assess patient's chronic conditions and comorbid symptoms for stability, deterioration, or improvement   Collaborate with multidisciplinary team to address chronic and comorbid conditions and prevent exacerbation or deterioration     Problem: ABCDS Injury Assessment  Goal: Absence of physical injury  3/12/2025 1410 by Leida Marina RN  Outcome: Progressing  Flowsheets (Taken  maintain glucose within target range     Problem: Hematologic - Adult  Goal: Maintains hematologic stability  3/12/2025 1410 by Leida Marina RN  Outcome: Progressing  Flowsheets (Taken 3/12/2025 0746)  Maintains hematologic stability:   Monitor labs for bleeding or clotting disorders   Assess for signs and symptoms of bleeding or hemorrhage     Problem: Skin/Tissue Integrity  Goal: Skin integrity remains intact  Description: 1.  Monitor for areas of redness and/or skin breakdown  2.  Assess vascular access sites hourly  3.  Every 4-6 hours minimum:  Change oxygen saturation probe site  4.  Every 4-6 hours:  If on nasal continuous positive airway pressure, respiratory therapy assess nares and determine need for appliance change or resting period  3/12/2025 1410 by Leida Marina RN  Outcome: Progressing  Flowsheets  Taken 3/12/2025 1014 by Leida Marina, RN  Skin Integrity Remains Intact: Monitor for areas of redness and/or skin breakdown  Taken 3/12/2025 0746 by Leida Marina, RN  Skin Integrity Remains Intact: Monitor for areas of redness and/or skin breakdown  Taken 3/12/2025 0422 by Evonne Danielle RN  Skin Integrity Remains Intact:   Monitor for areas of redness and/or skin breakdown   Every 4-6 hours minimum: Change oxygen saturation probe site

## 2025-03-12 NOTE — PROGRESS NOTES
Reason for Follow up: Acute kidney injury  Chronic kidney disease stage III  History of right nephrectomy  Status post sigmoid resection  Malnutrition on TPN  History of renal cell carcinoma status post right nephrectomy      HISTORY:    Patient was seen and examined discussed with RN previous entries noted urine output over 5 L net -708 mL.  Patient SpO2 is 99% blood pressure is stable slightly on the high side she is afebrile lab data shows increase in creatinine to 1.4 from 1.1 yesterday hemoglobin is 7.8 white count is 7.9 urine protein creatinine ratio is elevated protein electrophoresis is pending    Review Of Systems:   Constitutional: No fever, chills, lethargy, weakness or wt loss.  Cardiac:  No chest pain, dyspnea, orthopnea or PND.  Pulmonary:  No cough, phlegm or wheezing.  Abdomen:  No abdominal pain, nausea, vomiting or diarrhea.  :   No hematuria, pyuria, dysuria or flank pain.  Extremities:  No swelling or joint pains.      Review Of Systems:   Constitutional: No fever, chills, lethargy, weakness or wt loss.  HEENT:  No headache, nasal discharge or sore throat.  Cardiac:  No chest pain, dyspnea, orthopnea or PND.  Pulmonary:  No cough, phlegm or wheezing.  Abdomen:  No abdominal pain, nausea, vomiting or diarrhea.  Neuro:  No gross focal weakness, numbness, abnormal movements or seizure like activity.  Skin:   No rashes or itching.  :   No hematuria, pyuria, dysuria or flank pain.  Extremities:  No swelling or joint pains.  Endocrine: No polyuria, polydypsia, or thyroid problems.  Hematology:    No bleeding disorders, bruising or anemia.  All other ROS is negative.  Unable to obtain because he is intubated and sedated.     Scheduled Meds:   bisacodyl  5 mg Oral BID    furosemide  40 mg IntraVENous Daily    metoclopramide  5 mg IntraVENous Q6H    sodium chloride flush  5-40 mL IntraVENous 2 times per day    lidocaine 1 % injection  50 mg IntraDERmal Once    insulin lispro  0-4 Units SubCUTAneous  Q6H    scopolamine  1 patch TransDERmal Q72H    piperacillin-tazobactam  3,375 mg IntraVENous Q8H    acetaminophen  1,000 mg Oral 3 times per day    methocarbamol  500 mg Oral TID    [Held by provider] amLODIPine  5 mg Oral QHS    lisinopril  10 mg Oral BID    sodium chloride flush  5-40 mL IntraVENous 2 times per day    enoxaparin  40 mg SubCUTAneous Daily    famotidine  20 mg Oral Daily    Or    famotidine (PEPCID) injection  20 mg IntraVENous Daily   Continuous Infusions:   sodium chloride      dextrose      sodium chloride Stopped (03/09/25 1816)     PRN Meds:morphine **OR** morphine, sodium chloride flush, sodium chloride, oxyCODONE, glucose, dextrose bolus **OR** dextrose bolus, glucagon (rDNA), dextrose, sodium chloride flush, sodium chloride, potassium chloride **OR** potassium alternative oral replacement **OR** potassium chloride, ondansetron    No Known Allergies    Physical Exam:  Blood pressure 134/65, pulse 70, temperature 98.1 °F (36.7 °C), temperature source Oral, resp. rate 16, height 1.651 m (5' 5\"), weight 85.1 kg (187 lb 9.6 oz), last menstrual period 02/24/1998, SpO2 99%.  In: 2302 [P.O.:1550]  Out: 5415   In: 2302   Out: 5415 [Urine:5255; Drains:160]  CONSTITUTIONAL:  awake, alert, cooperative, no apparent distress, and appears stated age  General:  Awake, alert, not in distress. Appears to be stated age.  HEENT: Atraumatic, normocephalic. Anicteric sclera. Pink and moist oral mucosa. No carotid bruit. No JVD.  Chest: Bilateral air entry, clear to auscultation, no wheezing, rhonchi or rales.  Cardiovascular: RRR, S1S2, no murmur, rub or gallop. No lower extremity edema.    Abdomen: Soft, non tender to palpation. Active bowel sounds x 4 quadrants.  Musculoskeletal: Active ROM x 4 extremities. No cyanosis or clubbing.  Integumentary: Pink, warm and dry. Free from rash or lesions. Skin turgor normal.  CNS: Oriented to person, place and time. Speech clear. Face symmetrical. No tremor.

## 2025-03-12 NOTE — PROGRESS NOTES
Physical Therapy  Facility/Department: [facility]  Daily Treatment Note  NAME: Ness Castañeda  : 1950  MRN: [id number]    Date of Service: 3/12/2025    Discharge Recommendations:  Patient would benefit from continued therapy after discharge    Due to recent hospitalization and medical condition, pt would benefit from additional intermittent skilled therapy at time of discharge.  Please refer to the AM-PAC score for current functional status.      Patient Diagnosis(es): The encounter diagnosis was Diverticulosis.    Assessment  Assessment: Patient able to ambulate about 250' with 5 standing rest breaks. Then performed standing HEP x 10 reps with increased time and effort, and noted shakiness and increased SOB, required seated rest breaks between each exercises. Patient progressing toward STGs but limited by endurance and deconditioning. Patient would benefit from continued skilled PT services.  Activity Tolerance: Patient tolerated treatment well;Patient limited by endurance    Plan  Physical Therapy Plan  General Plan: 1 time a day 7 days a week  Current Treatment Recommendations: Strengthening;Balance training;Functional mobility training;Transfer training;Endurance training;Gait training;Stair training;Neuromuscular re-education;Home exercise program;Safety education & training;Patient/Caregiver education & training;Equipment evaluation, education, & procurement;Therapeutic activities    Restrictions  Restrictions/Precautions  Restrictions/Precautions: Fall Risk, General Precautions, Surgical Protocols  Activity Level: Up as Tolerated, Up with Assist  Required Braces or Orthoses?: Yes  Required Braces or Orthoses  Other: Abdominal Binder  Position Activity Restriction  Other Position/Activity Restrictions: RUE IV, abd binder as needed when up,  up with assist     Subjective   Subjective  Subjective: Patient resting in bedside chair and agreeable for PT treatment.    Objective  Bed Mobility Training  Bed  needed climbing 3-5 steps with a railing?: A Little  AM-PAC Inpatient Mobility Raw Score : 19  AM-PAC Inpatient T-Scale Score : 45.44  Mobility Inpatient CMS 0-100% Score: 41.77  Mobility Inpatient CMS G-Code Modifier : CK         Therapy Time   Individual Concurrent Group Co-treatment   Time In 1551         Time Out 1631         Minutes 40                 Marlee Parker, PTA

## 2025-03-12 NOTE — PROGRESS NOTES
General Surgery Progress Note            PATIENT NAME: Ness Castañeda     TODAY'S DATE: 3/12/2025, 4:40 AM    SUBJECTIVE:    Patient seen and examined at the bedside.  No acute events reported overnight.  Ambulating well.  Tolerating her GI soft diet yesterday.    OBJECTIVE:   VITALS:  BP (!) 159/72   Pulse 78   Temp 97.2 °F (36.2 °C) (Oral)   Resp 16   Ht 1.651 m (5' 5\")   Wt 85.1 kg (187 lb 9.6 oz)   LMP 02/24/1998   SpO2 99%   BMI 31.22 kg/m²      INTAKE/OUTPUT:      Intake/Output Summary (Last 24 hours) at 3/12/2025 0440  Last data filed at 3/12/2025 0159  Gross per 24 hour   Intake 3241.62 ml   Output 3950 ml   Net -708.38 ml       PHYSICAL EXAM  GEN: Alert, awake, oriented, NAD  HEENT: normocephalic, no scleral icterus, moist mucous membranes  CV: Regular rate and rhythm  LUNG: Unlabored breathing on RA  ABDOMEN: Soft, softly distended, mildly tender to palpation near incisions.  No rebound, guarding, or rigidity.  Incisions are clean and dry with staples intact.   No active bleeding noted. SOFÍA intact with serous output.  EXTREMITIES: Bilateral lower extremity edema    Data:  CBC with Differential:    Lab Results   Component Value Date/Time    WBC 10.6 03/11/2025 05:50 AM    RBC 2.98 03/11/2025 05:50 AM    HGB 7.8 03/11/2025 05:50 AM    HCT 27.2 03/11/2025 05:50 AM     03/11/2025 05:50 AM    MCV 91.3 03/11/2025 05:50 AM    MCH 26.2 03/11/2025 05:50 AM    MCHC 28.7 03/11/2025 05:50 AM    RDW 19.9 03/11/2025 05:50 AM    LYMPHOPCT 7 03/05/2025 05:47 AM    MONOPCT 6 03/05/2025 05:47 AM    EOSPCT 2 03/05/2025 05:47 AM    BASOPCT 0 03/05/2025 05:47 AM    MONOSABS 0.93 03/05/2025 05:47 AM    LYMPHSABS 1.17 03/05/2025 05:47 AM    EOSABS 0.38 03/05/2025 05:47 AM    BASOSABS 0.04 03/05/2025 05:47 AM    DIFFTYPE NOT REPORTED 11/08/2016 11:05 AM     BMP:    Lab Results   Component Value Date/Time     03/11/2025 05:50 AM    K 4.3 03/11/2025 05:50 AM     03/11/2025 05:50 AM    CO2 25 03/11/2025  gas in the rectum. Findings most likely represent postoperative   ileus.  Suggest follow-up KUB as per clinical.   2. Rounded soft tissue density right transverse colon, possibly a polyp or   other soft tissue density.             ASSESSMENT   74 year old female s/p robotic assisted open sigmoid resection 2/26/2025  Pathology: Diverticulosis, abscess cavity with associated dense adhesions and inflammation    Plan  Patient seen and examined.  SOFÍA removed at bedside today without issue.  Diet: Will advance to regular diet  Antiemetics: As needed, added Reglan  Continue to monitor bowel function added Dulcolax twice daily  Strict I's and O's.  Daily Lasix per nephrology.  Continue IV antibiotics, Zosyn  Continue daily wound care.  RN order placed  Encourage incentive spirometer use  Encourage OOB and ambulation, work with PT/OT  Likely discharge in the next day or so      Cookie Westfall, DO 03/12/25  4:40 AM   General Surgery PGY 1   Attending Physician Statement  I have discussed the case, including pertinent history and exam findings with the resident. I agree with the assessment, plan and orders as documented by the resident.     Feeling much better  No n/v  Advance to Regular diet  Remove SOFÍA drain  Discharge in am if all agree

## 2025-03-12 NOTE — PLAN OF CARE
Patient had a Sigmoid Resection Lap. On 2/26.  She has an abdominal binder in place and a SOFÍA drain that is collecting Serous drainage in it. Patient has been urinating well.  She also has been ambulating in the [place] with a walker and  tolerating it well. She is alert and oriented . Plan of care is ongoing.    Pain level assessment complete.   Patient educated on pain scale and control interventions  PRN pain medication given per patient request  Patient instructed to call out with new onset of pain or unrelieved pain    Problem: Pain  Goal: Verbalizes/displays adequate comfort level or baseline comfort level  3/12/2025 0417 by Evonne Danielle RN  Outcome: Progressing  3/11/2025 1433 by Edith Mayo RN  Outcome: Adequate for Discharge     Siderails up x 2  Hourly rounding  Call light in reach  Instructed to call for assist before attempting out of bed.  Remains free from falls and accidental injury at this time   Floor free from obstacles  Bed is locked and in lowest position  Adequate lighting provided  Bed alarm on, Red Falling star and Stay with Me signs posted      Problem: Safety - Adult  Goal: Free from fall injury  3/12/2025 0417 by Evonne Danielle RN  Outcome: Progressing  3/11/2025 1433 by Edith Mayo RN  Outcome: Progressing     Problem: ABCDS Injury Assessment  Goal: Absence of physical injury  3/12/2025 0417 by Evonne Danielle RN  Outcome: Progressing  3/11/2025 1433 by Edith Mayo RN  Outcome: Progressing     Problem: Neurosensory - Adult  Goal: Absence of seizures  3/12/2025 0417 by Evonne Danielle RN  Outcome: Progressing  3/11/2025 1433 by Edith Mayo RN  Outcome: Adequate for Discharge  Goal: Remains free of injury related to seizures activity  3/12/2025 0417 by Evonne Danielle RN  Outcome: Progressing  3/11/2025 1433 by Edith Mayo RN  Outcome: Adequate for Discharge     Problem: Respiratory - Adult  Goal: Achieves

## 2025-03-13 VITALS
HEIGHT: 65 IN | SYSTOLIC BLOOD PRESSURE: 142 MMHG | RESPIRATION RATE: 17 BRPM | WEIGHT: 187.6 LBS | HEART RATE: 72 BPM | TEMPERATURE: 98.1 F | OXYGEN SATURATION: 98 % | DIASTOLIC BLOOD PRESSURE: 68 MMHG | BODY MASS INDEX: 31.25 KG/M2

## 2025-03-13 LAB
ANION GAP SERPL CALCULATED.3IONS-SCNC: 9 MMOL/L (ref 9–16)
ANION GAP SERPL CALCULATED.3IONS-SCNC: 9 MMOL/L (ref 9–16)
BUN SERPL-MCNC: 17 MG/DL (ref 8–23)
BUN SERPL-MCNC: 17 MG/DL (ref 8–23)
CALCIUM SERPL-MCNC: 9.1 MG/DL (ref 8.8–10.2)
CALCIUM SERPL-MCNC: 9.1 MG/DL (ref 8.8–10.2)
CHLORIDE SERPL-SCNC: 107 MMOL/L (ref 98–107)
CHLORIDE SERPL-SCNC: 109 MMOL/L (ref 98–107)
CO2 SERPL-SCNC: 24 MMOL/L (ref 20–31)
CO2 SERPL-SCNC: 24 MMOL/L (ref 20–31)
CREAT SERPL-MCNC: 1.5 MG/DL (ref 0.5–0.9)
CREAT SERPL-MCNC: 1.6 MG/DL (ref 0.5–0.9)
GFR, ESTIMATED: 35 ML/MIN/1.73M2
GFR, ESTIMATED: 35 ML/MIN/1.73M2
GLUCOSE BLD-MCNC: 88 MG/DL (ref 65–105)
GLUCOSE BLD-MCNC: 92 MG/DL (ref 65–105)
GLUCOSE SERPL-MCNC: 84 MG/DL (ref 82–115)
GLUCOSE SERPL-MCNC: 91 MG/DL (ref 82–115)
MAGNESIUM SERPL-MCNC: 1.8 MG/DL (ref 1.6–2.4)
PHOSPHATE SERPL-MCNC: 3.9 MG/DL (ref 2.5–4.5)
POTASSIUM SERPL-SCNC: 3.9 MMOL/L (ref 3.7–5.3)
POTASSIUM SERPL-SCNC: 4.1 MMOL/L (ref 3.7–5.3)
SODIUM SERPL-SCNC: 140 MMOL/L (ref 136–145)
SODIUM SERPL-SCNC: 142 MMOL/L (ref 136–145)

## 2025-03-13 PROCEDURE — 51798 US URINE CAPACITY MEASURE: CPT

## 2025-03-13 PROCEDURE — 83735 ASSAY OF MAGNESIUM: CPT

## 2025-03-13 PROCEDURE — 6370000000 HC RX 637 (ALT 250 FOR IP)

## 2025-03-13 PROCEDURE — 82947 ASSAY GLUCOSE BLOOD QUANT: CPT

## 2025-03-13 PROCEDURE — 36415 COLL VENOUS BLD VENIPUNCTURE: CPT

## 2025-03-13 PROCEDURE — 6370000000 HC RX 637 (ALT 250 FOR IP): Performed by: STUDENT IN AN ORGANIZED HEALTH CARE EDUCATION/TRAINING PROGRAM

## 2025-03-13 PROCEDURE — 97535 SELF CARE MNGMENT TRAINING: CPT

## 2025-03-13 PROCEDURE — 97530 THERAPEUTIC ACTIVITIES: CPT

## 2025-03-13 PROCEDURE — 97116 GAIT TRAINING THERAPY: CPT

## 2025-03-13 PROCEDURE — 6360000002 HC RX W HCPCS: Performed by: SURGERY

## 2025-03-13 PROCEDURE — 2580000003 HC RX 258

## 2025-03-13 PROCEDURE — 84100 ASSAY OF PHOSPHORUS: CPT

## 2025-03-13 PROCEDURE — 6360000002 HC RX W HCPCS

## 2025-03-13 PROCEDURE — 6370000000 HC RX 637 (ALT 250 FOR IP): Performed by: SURGERY

## 2025-03-13 PROCEDURE — 80048 BASIC METABOLIC PNL TOTAL CA: CPT

## 2025-03-13 PROCEDURE — 2500000003 HC RX 250 WO HCPCS: Performed by: SURGERY

## 2025-03-13 RX ORDER — OXYCODONE HYDROCHLORIDE 5 MG/1
5 TABLET ORAL EVERY 8 HOURS PRN
Qty: 5 TABLET | Refills: 0 | Status: SHIPPED | OUTPATIENT
Start: 2025-03-13 | End: 2025-03-16

## 2025-03-13 RX ORDER — ONDANSETRON 4 MG/1
4 TABLET, FILM COATED ORAL 3 TIMES DAILY PRN
Qty: 15 TABLET | Refills: 0 | Status: SHIPPED | OUTPATIENT
Start: 2025-03-13

## 2025-03-13 RX ORDER — FAMOTIDINE 20 MG/1
20 TABLET, FILM COATED ORAL DAILY
Qty: 30 TABLET | Refills: 0 | Status: SHIPPED | OUTPATIENT
Start: 2025-03-14

## 2025-03-13 RX ORDER — SCOPOLAMINE 1 MG/3D
1 PATCH, EXTENDED RELEASE TRANSDERMAL
Qty: 3 PATCH | Refills: 0 | Status: SHIPPED | OUTPATIENT
Start: 2025-03-16 | End: 2025-03-25

## 2025-03-13 RX ORDER — SCOPOLAMINE 1 MG/3D
1 PATCH, EXTENDED RELEASE TRANSDERMAL
Qty: 3 PATCH | Refills: 0 | Status: SHIPPED | OUTPATIENT
Start: 2025-03-16 | End: 2025-03-13

## 2025-03-13 RX ORDER — FAMOTIDINE 20 MG/1
20 TABLET, FILM COATED ORAL DAILY
Qty: 30 TABLET | Refills: 0 | Status: SHIPPED | OUTPATIENT
Start: 2025-03-14 | End: 2025-03-13

## 2025-03-13 RX ADMIN — BISACODYL 5 MG: 5 TABLET, COATED ORAL at 10:35

## 2025-03-13 RX ADMIN — PIPERACILLIN AND TAZOBACTAM 3375 MG: 3; .375 INJECTION, POWDER, LYOPHILIZED, FOR SOLUTION INTRAVENOUS at 05:19

## 2025-03-13 RX ADMIN — SODIUM CHLORIDE, PRESERVATIVE FREE 10 ML: 5 INJECTION INTRAVENOUS at 14:37

## 2025-03-13 RX ADMIN — FAMOTIDINE 20 MG: 20 TABLET, FILM COATED ORAL at 10:35

## 2025-03-13 RX ADMIN — ACETAMINOPHEN 1000 MG: 500 TABLET ORAL at 05:02

## 2025-03-13 RX ADMIN — ACETAMINOPHEN 1000 MG: 500 TABLET ORAL at 14:35

## 2025-03-13 RX ADMIN — METOCLOPRAMIDE HYDROCHLORIDE 5 MG: 5 INJECTION INTRAMUSCULAR; INTRAVENOUS at 05:03

## 2025-03-13 RX ADMIN — METOCLOPRAMIDE HYDROCHLORIDE 5 MG: 5 INJECTION INTRAMUSCULAR; INTRAVENOUS at 14:35

## 2025-03-13 ASSESSMENT — PAIN DESCRIPTION - DESCRIPTORS
DESCRIPTORS: ACHING
DESCRIPTORS: DISCOMFORT

## 2025-03-13 ASSESSMENT — PAIN SCALES - GENERAL
PAINLEVEL_OUTOF10: 1
PAINLEVEL_OUTOF10: 1
PAINLEVEL_OUTOF10: 0
PAINLEVEL_OUTOF10: 0

## 2025-03-13 ASSESSMENT — PAIN DESCRIPTION - ORIENTATION
ORIENTATION: MID
ORIENTATION: MID

## 2025-03-13 ASSESSMENT — PAIN DESCRIPTION - LOCATION
LOCATION: GENERALIZED
LOCATION: ABDOMEN

## 2025-03-13 NOTE — PROGRESS NOTES
General Surgery Progress Note            PATIENT NAME: Ness Castañeda     TODAY'S DATE: 3/13/2025, 5:14 AM    SUBJECTIVE:    Patient seen and examined at the bedside.  No acute events reported overnight.  Ambulating well.  Tolerated regular diet without issue.  Has had a bowel movement yesterday, continues to pass flatus.  Is overall doing very well and ready to be discharged home.    OBJECTIVE:   VITALS:  /61   Pulse 81   Temp 97.5 °F (36.4 °C) (Oral)   Resp 16   Ht 1.651 m (5' 5\")   Wt 85.1 kg (187 lb 9.6 oz)   LMP 02/24/1998   SpO2 96%   BMI 31.22 kg/m²      INTAKE/OUTPUT:      Intake/Output Summary (Last 24 hours) at 3/13/2025 0514  Last data filed at 3/13/2025 0349  Gross per 24 hour   Intake 577.43 ml   Output 3340 ml   Net -2762.57 ml       PHYSICAL EXAM  GEN: Alert, awake, oriented, NAD  HEENT: normocephalic, no scleral icterus, moist mucous membranes  CV: Regular rate and rhythm  LUNG: Unlabored breathing on RA  ABDOMEN: Soft, softly distended, mildly tender to palpation near incisions.  No rebound, guarding, or rigidity.  Incisions are clean and dry with staples intact.   No active bleeding noted.  EXTREMITIES: Bilateral lower extremity edema    Data:  CBC with Differential:    Lab Results   Component Value Date/Time    WBC 7.9 03/12/2025 05:56 AM    RBC 2.86 03/12/2025 05:56 AM    HGB 7.6 03/12/2025 05:56 AM    HCT 26.2 03/12/2025 05:56 AM     03/12/2025 05:56 AM    MCV 91.6 03/12/2025 05:56 AM    MCH 26.6 03/12/2025 05:56 AM    MCHC 29.0 03/12/2025 05:56 AM    RDW 20.4 03/12/2025 05:56 AM    LYMPHOPCT 7 03/05/2025 05:47 AM    MONOPCT 6 03/05/2025 05:47 AM    EOSPCT 2 03/05/2025 05:47 AM    BASOPCT 0 03/05/2025 05:47 AM    MONOSABS 0.93 03/05/2025 05:47 AM    LYMPHSABS 1.17 03/05/2025 05:47 AM    EOSABS 0.38 03/05/2025 05:47 AM    BASOSABS 0.04 03/05/2025 05:47 AM    DIFFTYPE NOT REPORTED 11/08/2016 11:05 AM     BMP:    Lab Results   Component Value Date/Time     03/12/2025 05:56    1. Gas-filled mildly distended large and small bowel loops in the mid   abdomen. No gas in the rectum. Findings most likely represent postoperative   ileus.  Suggest follow-up KUB as per clinical.   2. Rounded soft tissue density right transverse colon, possibly a polyp or   other soft tissue density.             ASSESSMENT   74 year old female s/p robotic assisted open sigmoid resection 2/26/2025  Pathology: Diverticulosis, abscess cavity with associated dense adhesions and inflammation    Plan  She is 13 days postop, doing well.  Having bowel function.  Tolerating regular diet.  Ambulating without issue.  Pain well-controlled.  She is stable for discharge from general surgery perspective.  We will remove staples at bedside today.  Antiemetics: As needed, added Reglan  Continue to monitor bowel function added Dulcolax twice daily  Strict I's and O's.  Daily Lasix per nephrology.  Continue daily wound care.  RN order placed  Encourage incentive spirometer use  Encourage OOB and ambulation, work with PT/OT      Cookie Westfall DO 03/13/25  5:14 AM   General Surgery PGY 1   Attending Physician Statement  I have discussed the case, including pertinent history and exam findings with the resident. I agree with the assessment, plan and orders as documented by the resident.     For home today.  Remove staples prior to discharge

## 2025-03-13 NOTE — CARE COORDINATION
Transitional Planning  Met with family and daughter at bedside.  Plan to return home once cleared by Nephrology.  Home with jered.  Notified Jovita (Jered) probable DC today.  Jovita to pull records electronically.    PS to Dr. Barksdale for inpatient consult for HH and CASSIE completion. Read [id number]

## 2025-03-13 NOTE — PROGRESS NOTES
Physical Therapy  Facility/Department: [facility]  Daily Treatment Note  NAME: Ness Castañeda  : 1950  MRN: [id number]    Date of Service: 3/13/2025    URIAH Navarro/Key reports patient is medically stable for therapy treatment this date.    Chart reviewed prior to treatment and patient is agreeable for therapy.  All lines intact and patient positioned comfortably at end of treatment.  All patient needs addressed prior to ending therapy session.     Discharge Recommendations:  Patient would benefit from continued therapy after discharge   Due to recent hospitalization and medical condition, pt would benefit from additional intermittent skilled therapy at time of discharge.  Please refer to the AM-PAC score for current functional status.         Patient Diagnosis(es): The primary encounter diagnosis was Post-op pain. A diagnosis of Diverticulosis was also pertinent to this visit.    Assessment  Assessment: 73 y/o female referred to PT.   Therapist reviewed ABD precautions with patient. Therapist observed patient up in hallway ambulating with dtr. Patient reports she has been ambulating at least 4x per day. Patient declined ambulation however was agreeable to bed mobility and stair training. Patient requiring CGA-SBA with funcitonal mobility using RW. Patient is a increased fall risk, functioning below baseline and would benefit from continued PT.  Activity Tolerance: Patient tolerated treatment well;Patient limited by endurance    Plan  Physical Therapy Plan  General Plan: 1 time a day 7 days a week  Current Treatment Recommendations: Strengthening;Balance training;Functional mobility training;Transfer training;Endurance training;Gait training;Stair training;Neuromuscular re-education;Home exercise program;Safety education & training;Patient/Caregiver education & training;Equipment evaluation, education, & procurement;Therapeutic activities    Restrictions  Restrictions/Precautions  Restrictions/Precautions: Fall

## 2025-03-13 NOTE — PROGRESS NOTES
Occupational Therapy  Facility/Department: [facility]  Daily Treatment Note  NAME: Ness Castañeda  : 1950  MRN: [id number]        RN WILLIE reports patient is medically stable for therapy treatment this date.    Chart reviewed prior to treatment and patient is agreeable for therapy.  All lines intact and patient positioned comfortably at end of treatment.  All patient needs addressed prior to ending therapy session.     Due to recent hospitalization and medical condition, pt would benefit from additional intermittent skilled therapy at time of discharge.  Please refer to the AM-PAC score for current functional status.          Date of Service: 3/13/2025    Discharge Recommendations:  Patient would benefit from continued therapy after discharge  OT Equipment Recommendations  Equipment Needed: Yes  Mobility Devices: ADL Assistive Devices  ADL Assistive Devices: Reacher;Long-handled Shoe Horn;Long-handled Sponge;Emergency Alert System      Patient Diagnosis(es): The primary encounter diagnosis was Post-op pain. A diagnosis of Diverticulosis was also pertinent to this visit.     Assessment   Assessment: Pt with no NGT/eating food with no issues, no c/o pain, however continues to display some fatigue/decreased act raj.  Pt requested to complete self care with writer given choices of what to address in tx.  Pt more conversative this date however displays flat affect and voices desire to DC home in pm.  Pt continues to require min assist for self care tasks and stated her dtr will assist with any needs at DC and she has great family support.  Pt with some bleeding noted on pull up brief as pt stood up to change into mesh underweer and wash up/nursing was informed and requested to come to room to assess.  Continue with teach and train of AE use to increase I with self care tasks.  Activity Tolerance: Patient tolerated treatment well;Patient limited by endurance  Discharge Recommendations: Patient would benefit from

## 2025-03-13 NOTE — PLAN OF CARE
Problem: Chronic Conditions and Co-morbidities  Goal: Patient's chronic conditions and co-morbidity symptoms are monitored and maintained or improved  3/13/2025 1636 by Edith Mayo RN  Outcome: Progressing  3/13/2025 0352 by Evonne Danielle RN  Outcome: Progressing  Flowsheets (Taken 3/12/2025 2046 by Rashida English)  Care Plan - Patient's Chronic Conditions and Co-Morbidity Symptoms are Monitored and Maintained or Improved:   Monitor and assess patient's chronic conditions and comorbid symptoms for stability, deterioration, or improvement   Collaborate with multidisciplinary team to address chronic and comorbid conditions and prevent exacerbation or deterioration     Problem: Nutrition Deficit:  Goal: Optimize nutritional status  3/13/2025 1636 by Edith Mayo RN  Outcome: Progressing  Flowsheets (Taken 3/13/2025 1158 by Nadja Gonzalez, RD, LD)  Nutrient intake appropriate for improving, restoring, or maintaining nutritional needs:   Monitor oral intake, labs, and treatment plans   Assess nutritional status and recommend course of action  3/13/2025 0352 by Evonne Danielle RN  Outcome: Progressing     Problem: Skin/Tissue Integrity - Adult  Goal: Skin integrity remains intact  Description: 1.  Monitor for areas of redness and/or skin breakdown  2.  Assess vascular access sites hourly  3.  Every 4-6 hours minimum:  Change oxygen saturation probe site  4.  Every 4-6 hours:  If on nasal continuous positive airway pressure, respiratory therapy assess nares and determine need for appliance change or resting period  3/13/2025 1636 by Edith Mayo RN  Outcome: Progressing  Flowsheets (Taken 3/13/2025 0359 by Evonne Danielle, RN)  Skin Integrity Remains Intact:   Monitor for areas of redness and/or skin breakdown   Every 4-6 hours minimum: Change oxygen saturation probe site  3/13/2025 0352 by Evonne Danielle RN  Outcome: Progressing  Flowsheets  Taken  range:   Monitor blood glucose as ordered   Assess for signs and symptoms of hyperglycemia and hypoglycemia

## 2025-03-13 NOTE — PROGRESS NOTES
Nutrition Assessment     Type and Reason for Visit: Reassess    Nutrition Recommendations/Plan:   Regular diet  Ensure Clear at breakfast  Monitor p.o intakes, GI function and labs     Malnutrition Assessment:  Malnutrition Status: At risk for malnutrition    Nutrition Assessment:  Paient is tolerating the Regular diet and reports consuming 26-50% of breakfast today. Patient reports bowel movements and flatus. Ptient is ready for discharge. Continue Regular diet. Monitor p.o intakes, GI function and labs.    Estimated Daily Nutrient Needs:  Energy (kcal):  2342-2666 kcal (20-25 kcal/kg) Weight Used for Energy Requirements: Admission     Protein (g):  74-91 gm of protein (1.3-1.6 gm/kg) Weight Used for Protein Requirements: Ideal        Fluid (ml/day):  1822-9555 mL Method Used for Fluid Requirements: 1 ml/kcal    Nutrition Related Findings:   Edema: trace BUE, +1 BLE. Active bowel sounds. Wound Type: Surgical Incision    Current Nutrition Therapies:    ADULT ORAL NUTRITION SUPPLEMENT; Breakfast, Dinner; Clear Liquid Oral Supplement  ADULT DIET; Regular    Anthropometric Measures:  Height: 165.1 cm (5' 5\")  Current Body Wt: 85.1 kg (187 lb 9.8 oz)   BMI: 31.2        Nutrition Diagnosis:   Inadequate oral intake related to inadequate protein-energy intake, altered GI function as evidenced by GI abnormality, s/p surgery, intake 26-50%    Nutrition Interventions:   Food and/or Nutrient Delivery: Continue Current Diet, Continue Oral Nutrition Supplement  Nutrition Education/Counseling: Education/Counseling not indicated  Coordination of Nutrition Care: Continue to monitor while inpatient       Goals:  Goals: PO intake 75% or greater  Type of Goal: Continue current goal  Previous Goal Met: Progressing toward Goal(s)    Nutrition Monitoring and Evaluation:      Food/Nutrient Intake Outcomes: Food and Nutrient Intake  Physical Signs/Symptoms Outcomes: Biochemical Data, GI Status, Fluid Status or Edema, Skin,

## 2025-03-13 NOTE — PROGRESS NOTES
11/08/2016 11:05 AM    RBCUA None 03/05/2025 10:09 AM    GLUCOSEU NEGATIVE 03/09/2025 08:31 AM       Radiology:   XR ABDOMEN (KUB) (SINGLE AP VIEW)   Final Result   1. Unchanged dilated loops of small bowel due to an adynamic ileus.         XR CHEST PORTABLE   Final Result   No acute airspace disease identified.      Right PICC line terminates at the level of the right atrium.         CT ABDOMEN PELVIS WO CONTRAST Additional Contrast? Oral (water soluble through NGT)   Final Result   1. Status post sigmoid colectomy with fat stranding adjacent the sigmoid   colon which could be postoperative or related to mild colitis. No free air or   focal fluid collection is identified.   2. Dilated proximal small bowel loops without an abrupt transition in   caliber.  Ileus is favored over some degree of bowel obstruction.   3. Cholelithiasis.   4. Trace bilateral pleural effusions with adjacent airspace opacities in the   lower lobes which could represent atelectasis or pneumonia.   5. 1.2 cm right adrenal adenoma.  No follow-up imaging is recommended.   6. Air in the bladder which could be related to recent instrumentation or   cystitis.         XR ABDOMEN FOR NG/OG/NE TUBE PLACEMENT   Final Result   1. Nasogastric tube terminating in the gastric body.         XR CHEST PORTABLE   Final Result   No acute airspace disease identified.         XR ABDOMEN (KUB) (SINGLE AP VIEW)   Final Result   Mildly dilated loops of small bowel in the upper abdomen could represent   partial small bowel obstruction versus ileus         XR ABDOMEN FOR NG/OG/NE TUBE PLACEMENT   Final Result   Enteric tube with the tip and side-port in the stomach.         XR ABDOMEN (KUB) (SINGLE AP VIEW)   Final Result   1. Gas-filled mildly distended large and small bowel loops in the mid   abdomen. No gas in the rectum. Findings most likely represent postoperative   ileus.  Suggest follow-up KUB as per clinical.   2. Rounded soft tissue density right transverse  colon, possibly a polyp or   other soft tissue density.           No results found.      EKG:     Electronically signed by Live Mojica MD on 3/12/2025 at 8:04 PM

## 2025-03-13 NOTE — PLAN OF CARE
Patient has been doing well ambulating in the halls.   She has made 3 rounds and a half round .  She is tolerating ambulation very well.  Patient is urinating good.  She also had a small greenish brown soft stool.  She has no complaints at this time.  She does have some pitting edema in her lower extremities but Below the knee is looking better as far as swelling.  Her knees have a shiny look to them.  Plan of care ongoing.    Pain level assessment complete.   Patient educated on pain scale and control interventions  PRN pain medication given per patient request  Patient instructed to call out with new onset of pain or unrelieved pain    Problem: Pain  Goal: Verbalizes/displays adequate comfort level or baseline comfort level  3/13/2025 0352 by Evonne Danielle RN  Outcome: Progressing  3/12/2025 1410 by Leida Marina RN  Outcome: Progressing     Siderails up x 2  Hourly rounding  Call light in reach  Instructed to call for assist before attempting out of bed.  Remains free from falls and accidental injury at this time   Floor free from obstacles  Bed is locked and in lowest position  Adequate lighting provided  Bed alarm on, Red Falling star and Stay with Me signs posted      Problem: Safety - Adult  Goal: Free from fall injury  3/13/2025 0352 by Evonne Danielle RN  Outcome: Progressing  Flowsheets (Taken 3/12/2025 2147 by Rashida English)  Free From Fall Injury: Instruct family/caregiver on patient safety  3/12/2025 1410 by Leida Marina RN  Outcome: Progressing  Flowsheets (Taken 3/12/2025 1014)  Free From Fall Injury: Instruct family/caregiver on patient safety     Problem: Safety - Adult  Goal: Free from fall injury  Recent Flowsheet Documentation  Taken 3/12/2025 2147 by Rashida English  Free From Fall Injury: Instruct family/caregiver on patient safety     Problem: Chronic Conditions and Co-morbidities  Goal: Patient's chronic conditions and co-morbidity symptoms are monitored  discharge:   Assess and monitor for signs and symptoms of infection   Monitor lab/diagnostic results   Monitor all insertion sites i.e., indwelling lines, tubes and drains  3/12/2025 1410 by Leida Marina RN  Outcome: Progressing  Flowsheets (Taken 3/12/2025 0746)  Absence of infection at discharge:   Assess and monitor for signs and symptoms of infection   Monitor lab/diagnostic results     Problem: Nutrition Deficit:  Goal: Optimize nutritional status  3/13/2025 0352 by Evonne Danielle RN  Outcome: Progressing  3/12/2025 1410 by Leida Marina RN  Outcome: Progressing     Problem: Skin/Tissue Integrity  Goal: Skin integrity remains intact  Description: 1.  Monitor for areas of redness and/or skin breakdown  2.  Assess vascular access sites hourly  3.  Every 4-6 hours minimum:  Change oxygen saturation probe site  4.  Every 4-6 hours:  If on nasal continuous positive airway pressure, respiratory therapy assess nares and determine need for appliance change or resting period  3/13/2025 0352 by Evonne Danielle RN  Outcome: Progressing  Flowsheets  Taken 3/12/2025 2147 by Rashida English  Skin Integrity Remains Intact: Monitor for areas of redness and/or skin breakdown  Taken 3/12/2025 2046 by Rashida English  Skin Integrity Remains Intact:   Monitor for areas of redness and/or skin breakdown   Assess vascular access sites hourly  3/12/2025 1410 by Leida Marina RN  Outcome: Progressing  Flowsheets  Taken 3/12/2025 1014 by Leida Marina RN  Skin Integrity Remains Intact: Monitor for areas of redness and/or skin breakdown  Taken 3/12/2025 0746 by Leida Marina RN  Skin Integrity Remains Intact: Monitor for areas of redness and/or skin breakdown  Taken 3/12/2025 0422 by Evonne Danielle RN  Skin Integrity Remains Intact:   Monitor for areas of redness and/or skin breakdown   Every 4-6 hours minimum: Change oxygen saturation probe site

## 2025-03-13 NOTE — PROGRESS NOTES
Patient discharged to home in good condition. Discharge instructions given and patient denies any further questions. Patient given paper order for BMP and instructed to [facility] at any [facility] days and to follow up with nephrology in 1 week. All belongings, meds, and scripts in patient possession at time of discharge. Patient escorted to vehicle by wheelchair.

## 2025-03-13 NOTE — PROGRESS NOTES
Nephrology Progress Note        Subjective: The patient is feeling better, she is tolerating p.o. intake, she denies nausea, vomiting or diarrhea, her blood pressure is stable, afebrile, no Triana in place, no dysuria or hematuria, she denies retention, weight is up, good urine output.    Objective:  CURRENT TEMPERATURE:  Temp: 97.5 °F (36.4 °C)  MAXIMUM TEMPERATURE OVER 24HRS:  Temp (24hrs), Av.8 °F (36.6 °C), Min:97.5 °F (36.4 °C), Max:98.1 °F (36.7 °C)    CURRENT RESPIRATORY RATE:  Respirations: 16  CURRENT PULSE:  Pulse: 81  CURRENT BLOOD PRESSURE:  BP: 133/61  24HR BLOOD PRESSURE RANGE:  Systolic (24hrs), Av , Min:133 , Max:144   ; Diastolic (24hrs), Av, Min:59, Max:66    24HR INTAKE/OUTPUT:    Intake/Output Summary (Last 24 hours) at 3/13/2025 0713  Last data filed at 3/13/2025 0500  Gross per 24 hour   Intake 577.43 ml   Output 3490 ml   Net -2912.57 ml     Weight   Wt Readings from Last 3 Encounters:   03/10/25 85.1 kg (187 lb 9.6 oz)   25 73.9 kg (163 lb)   24 79.8 kg (176 lb)       Physical Exam:  Awake, alert, in no acute distress  Skin: warm and dry, no rash or erythema  Pulmonary: clear to auscultation bilaterally- no wheezes, rales or rhonchi, normal air movement, no respiratory distress  Cardiovascular: Normal S1 & S2  Abdomen: soft nontender, bowel sounds present, no organomegaly,  no ascites  Extremities: no cyanosis, clubbing or edema    Current Medications:    bisacodyl (DULCOLAX) EC tablet 5 mg, BID  metoclopramide (REGLAN) injection 5 mg, Q6H  morphine (PF) injection 2 mg, Q2H PRN   Or  morphine sulfate (PF) injection 4 mg, Q2H PRN  sodium chloride flush 0.9 % injection 5-40 mL, 2 times per day  sodium chloride flush 0.9 % injection 5-40 mL, PRN  0.9 % sodium chloride infusion, PRN  lidocaine PF 1 % injection 50 mg, Once  insulin lispro (HUMALOG,ADMELOG) injection vial 0-4 Units, Q6H  scopolamine (TRANSDERM-SCOP) transdermal patch 1 patch, Q72H  piperacillin-tazobactam

## 2025-03-13 NOTE — PROGRESS NOTES
Hospitalist - Progress Note      Patient: Ness Castañeda    Unit/Bed:2018/2018-02  YOB: 1950  MRN: [id number]   Acct: [id number]   PCP: Eri Akbar MD    Date of Service: Pt seen/examined on 03/13/25      Chief Complaint:  medical management    Assessment and Plan:-  Left Colon Sigmoid Mass adhered to the Uterus s/p Left Laprascopic Open Sigmoid Resection   More abd distension, xray with concern for Post Op Ileus. Now has NGT and sxms have improved.   Gen surgery following and managing. On IV antibiotics.   NGT is replaced  NGT is removed now  Repeat CT abdomen reviewed  TPN by general surgery, insulin orders and TPN per general surgery  IV fluids.  Recommended to decrease IV fluids due to peripheral edema-now on 75 mL/h total- DC    Anemia-likely postoperative on chronic anemia  Recheck hgb. Transfuse for hgb <7  Pt is sitting at 7. If she gets symptomatic, such as low BP, Fast HR, dizziness, sob, cp, then I recommend 1 u prbcs.     CKD IIIa  Cr is at baseline.   Avoid nephrotoxic meds  Was given Lasix 20 mg IV x 1 for peripheral edema  Fluid overload  Continue 40 mg IV daily- dc  Pt developed PILO on CKD3a after diuresis. Nephrology OK w discharge. Recommend oral hydration, hold Lisinopril and recheck BMP in 3 days. Amlodipine already held sec to BL LE edema. DC Metformin since already has CKD.    Pre-Diabetes  Monitor glucose.   On metformin. Ok to hold while inpt. May need to DC since has CKD3    HTN  On norvasc, lisinopril.  Hold Norvasc secondary to peripheral edema and Lisinopril for PILO  If bp >150, will increase norvasc to 10mg and or can increase lisinopril to 20mg qd.     Iron Deficiency  S/p iron infusions x 2     7. Fluid overload- improved   Recommend diuresis   Nephrology consult   Noted weight gain since admission   Advised daily weight and strict I's and O's   Pt now ambulating, feels much better, appreciates all efforts        Plan:  Continue home medicines.   Monitor for  signs of bleeding.   Gen surgery primary and managing pain , dvt prophy, pt/ot.   ISS, Ac and hs protocol, hypoglycemia protocol.       DC only once OK w all cordelia Nephrology and Surgery    Subjective:  Pt seen and examined in room.   Feels better. Edema improving  Patient has a NGT placed again on 3/4/2025- DC d  peripheral edema improved.  Fluids have been discontinued      History Of Present Illness:      74 y F admitted by surgery for Left colon sigmoid mass adhered to the uterus with significant inflammatory process, s/p Lap Robotic assisted open sigmoid resection.   Patient tolerated the procedure well. No acute concerns.   We were consulted for medical management.   Patient hgb 6.8 this morning. Repeat H/H showed hgb 7.1  Consented for blood. Tolerated procedure well.       Past Medical History:        Diagnosis Date    Arthritis     Bladder tumor 12/2016    Blood in urine 12/2016    Cancer (HCC) 12/2016    BLADDER-REMOVED NO CHEMO OR RADIATION    Cancer (HCC) 12/2016    RT KIDNEY-PARTIAL REMOVAL NO CHEMO OR RADIATION    Dental crowns present     UPPER AND LOWER    Diabetes mellitus (HCC) 2011    ON ORAL MED    Diverticulitis     Hypertension 2015    ON RX STARTING 01/2017    Irregular heart beat     pt evaluated by cardiology 2/19/2025 - cleared for surgery    Kidney disease     stage 3 per pt    Prediabetes     Skin cancer     nose    Wears glasses        Past Surgical History:        Procedure Laterality Date    BLADDER TUMOR EXCISION  01/16/2017    TUR-BT    COLONOSCOPY  2006    DILATION AND CURETTAGE OF UTERUS  1995    ESOPHAGOGASTRODUODENOSCOPY      PARTIAL NEPHRECTOMY Right 02/27/2017    radical neph    PARTIAL NEPHRECTOMY Right 02/27/2017    NEPHRECTOMY PARTIAL, CONVERTED TO RADICAL SI ROBOTIC, INTRAOP ULTRASOUND  performed by Agustin Garrido MD at [facility] OR    SIGMOID COLECTOMY N/A 2/26/2025    LEFT LAPAROSCOPIC ROBOTIC XI ASSITED OPEN SIGMOID RESECTION WITH TAP BLOCK performed by Fracisco Barksdale MD at

## 2025-03-18 NOTE — DISCHARGE SUMMARY
NORVASC     lisinopril 10 MG tablet  Commonly known as: PRINIVIL;ZESTRIL     Lunesta 2 MG Tabs  Generic drug: eszopiclone     metFORMIN 500 MG extended release tablet  Commonly known as: GLUCOPHAGE-XR     metFORMIN 500 MG tablet  Commonly known as: GLUCOPHAGE     oxyCODONE-acetaminophen 5-325 MG per tablet  Commonly known as: Percocet            ASK your doctor about these medications      oxyCODONE 5 MG immediate release tablet  Commonly known as: Roxicodone  Take 1 tablet by mouth every 8 hours as needed for Pain for up to 3 days. Intended supply: 3 days. Take lowest dose possible to manage pain Max Daily Amount: 15 mg  Ask about: Should I take this medication?               Where to Get Your Medications        These medications were sent to [facility] [id number] - [facility] [address] - P [phone] - F [phone] [address], [place] [address]      Phone: [phone]   famotidine 20 MG tablet  scopolamine transdermal patch       You can get these medications from any pharmacy    Bring a paper prescription for each of these medications  ondansetron 4 MG tablet  oxyCODONE 5 MG immediate release tablet          HPI and Hospital Course:   74 y.o. female presented on 2/26/2025 for robotic assisted sigmoid resection.     2/26: robo converted to open sigmoidectomy. CLD post-op  2/27: tolerating CLD, no flatus  2/28: worsening distension and nausea. NG tube to LIWS  3/1: distension improved. No flatus, continue NGT. Bluish discoloration on packing, cipro/flagyl for poss pseudomonas.  3/2: passing flatus. Clamp trial. NGT removed. CLD  3/3: flatus + BM, FLD-> CLD  3/4: WBC 20.7, increased NV, increasing WBC, scop patch, IVF, UA, CXR neg . NGT placed, NPO  3/5: WBC 16.0, start TPN  3/6: CT Ww/o leak or abscess  3/7: CLD -> FLD  3/8: Remove NGT, regular diet  3/9: Emesis and dry heaving -> n.p.o., UA neg, CXR neg   3/10: CLD -> FLD  3/11: Advance to GI soft diet, RAFI DC'd     On day of

## 2025-03-23 ENCOUNTER — APPOINTMENT (OUTPATIENT)
Dept: GENERAL RADIOLOGY | Age: 75
DRG: 291 | End: 2025-03-23
Payer: MEDICARE

## 2025-03-23 ENCOUNTER — APPOINTMENT (OUTPATIENT)
Dept: CT IMAGING | Age: 75
DRG: 291 | End: 2025-03-23
Payer: MEDICARE

## 2025-03-23 ENCOUNTER — HOSPITAL ENCOUNTER (INPATIENT)
Age: 75
LOS: 3 days | Discharge: HOME | DRG: 291 | End: 2025-03-26
Attending: EMERGENCY MEDICINE | Admitting: FAMILY MEDICINE
Payer: MEDICARE

## 2025-03-23 DIAGNOSIS — J81.0 ACUTE PULMONARY EDEMA (HCC): ICD-10-CM

## 2025-03-23 DIAGNOSIS — R06.03 RESPIRATORY DISTRESS: Primary | ICD-10-CM

## 2025-03-23 DIAGNOSIS — I50.1 PULMONARY EDEMA WITH CONGESTIVE HEART FAILURE (HCC): ICD-10-CM

## 2025-03-23 DIAGNOSIS — I50.9 ACUTE CONGESTIVE HEART FAILURE, UNSPECIFIED HEART FAILURE TYPE (HCC): ICD-10-CM

## 2025-03-23 LAB
ANION GAP SERPL CALCULATED.3IONS-SCNC: 18 MMOL/L (ref 9–16)
ANTI-XA UNFRAC HEPARIN: <0.1 IU/L (ref 0.3–0.7)
BASOPHILS # BLD: 0.08 K/UL (ref 0–0.2)
BASOPHILS NFR BLD: 1 % (ref 0–2)
BNP SERPL-MCNC: 6493 PG/ML (ref 0–450)
BUN SERPL-MCNC: 18 MG/DL (ref 8–23)
CALCIUM SERPL-MCNC: 10 MG/DL (ref 8.8–10.2)
CHLORIDE SERPL-SCNC: 105 MMOL/L (ref 98–107)
CO2 SERPL-SCNC: 15 MMOL/L (ref 20–31)
CREAT SERPL-MCNC: 1.3 MG/DL (ref 0.5–0.9)
EOSINOPHIL # BLD: 0.5 K/UL (ref 0–0.44)
EOSINOPHILS RELATIVE PERCENT: 5 % (ref 1–4)
ERYTHROCYTE [DISTWIDTH] IN BLOOD BY AUTOMATED COUNT: 18.7 % (ref 11.8–14.4)
FIO2: 50
GFR, ESTIMATED: 43 ML/MIN/1.73M2
GLUCOSE SERPL-MCNC: 249 MG/DL (ref 82–115)
HCT VFR BLD AUTO: 36.1 % (ref 36.3–47.1)
HGB BLD-MCNC: 10.5 G/DL (ref 11.9–15.1)
IMM GRANULOCYTES # BLD AUTO: 0.06 K/UL (ref 0–0.3)
IMM GRANULOCYTES NFR BLD: 1 %
INR PPP: 1.1
LACTATE BLDV-SCNC: 1.7 MMOL/L (ref 0.5–1.9)
LYMPHOCYTES NFR BLD: 2.16 K/UL (ref 1.1–3.7)
LYMPHOCYTES RELATIVE PERCENT: 20 % (ref 24–43)
MAGNESIUM SERPL-MCNC: 2.1 MG/DL (ref 1.6–2.4)
MCH RBC QN AUTO: 27.1 PG (ref 25.2–33.5)
MCHC RBC AUTO-ENTMCNC: 29.1 G/DL (ref 28.4–34.8)
MCV RBC AUTO: 93.3 FL (ref 82.6–102.9)
MODE: ABNORMAL
MONOCYTES NFR BLD: 0.46 K/UL (ref 0.1–1.2)
MONOCYTES NFR BLD: 4 % (ref 3–12)
NEGATIVE BASE EXCESS, ART: 6 MMOL/L (ref 0–2)
NEUTROPHILS NFR BLD: 69 % (ref 36–65)
NEUTS SEG NFR BLD: 7.8 K/UL (ref 1.5–8.1)
NRBC BLD-RTO: 0 PER 100 WBC
O2 DELIVERY DEVICE: ABNORMAL
PARTIAL THROMBOPLASTIN TIME: 24 SEC (ref 23.9–33.8)
PHOSPHATE SERPL-MCNC: 4.7 MG/DL (ref 2.5–4.5)
PLATELET # BLD AUTO: 514 K/UL (ref 138–453)
PMV BLD AUTO: 10.5 FL (ref 8.1–13.5)
POC HCO3: 19.2 MMOL/L (ref 21–28)
POC O2 SATURATION: 98.8 % (ref 94–98)
POC PCO2: 35.9 MM HG (ref 35–48)
POC PH: 7.33 (ref 7.35–7.45)
POC PO2: 132.3 MM HG (ref 83–108)
POTASSIUM SERPL-SCNC: 4.2 MMOL/L (ref 3.7–5.3)
PROTHROMBIN TIME: 14.9 SEC (ref 11.5–14.2)
RBC # BLD AUTO: 3.87 M/UL (ref 3.95–5.11)
RBC # BLD: ABNORMAL 10*6/UL
SODIUM SERPL-SCNC: 138 MMOL/L (ref 136–145)
TROPONIN I SERPL HS-MCNC: 42 NG/L (ref 0–14)
WBC OTHER # BLD: 11.1 K/UL (ref 3.5–11.3)

## 2025-03-23 PROCEDURE — 71260 CT THORAX DX C+: CPT

## 2025-03-23 PROCEDURE — 85520 HEPARIN ASSAY: CPT

## 2025-03-23 PROCEDURE — 71045 X-RAY EXAM CHEST 1 VIEW: CPT

## 2025-03-23 PROCEDURE — 2500000003 HC RX 250 WO HCPCS: Performed by: EMERGENCY MEDICINE

## 2025-03-23 PROCEDURE — 5A09357 ASSISTANCE WITH RESPIRATORY VENTILATION, LESS THAN 24 CONSECUTIVE HOURS, CONTINUOUS POSITIVE AIRWAY PRESSURE: ICD-10-PCS | Performed by: RADIOLOGY

## 2025-03-23 PROCEDURE — 36600 WITHDRAWAL OF ARTERIAL BLOOD: CPT

## 2025-03-23 PROCEDURE — 93005 ELECTROCARDIOGRAM TRACING: CPT | Performed by: EMERGENCY MEDICINE

## 2025-03-23 PROCEDURE — 84100 ASSAY OF PHOSPHORUS: CPT

## 2025-03-23 PROCEDURE — 85610 PROTHROMBIN TIME: CPT

## 2025-03-23 PROCEDURE — 83605 ASSAY OF LACTIC ACID: CPT

## 2025-03-23 PROCEDURE — 87636 SARSCOV2 & INF A&B AMP PRB: CPT

## 2025-03-23 PROCEDURE — 2000000000 HC ICU R&B

## 2025-03-23 PROCEDURE — 80048 BASIC METABOLIC PNL TOTAL CA: CPT

## 2025-03-23 PROCEDURE — 87040 BLOOD CULTURE FOR BACTERIA: CPT

## 2025-03-23 PROCEDURE — 94761 N-INVAS EAR/PLS OXIMETRY MLT: CPT

## 2025-03-23 PROCEDURE — 83735 ASSAY OF MAGNESIUM: CPT

## 2025-03-23 PROCEDURE — 6360000004 HC RX CONTRAST MEDICATION: Performed by: EMERGENCY MEDICINE

## 2025-03-23 PROCEDURE — 85025 COMPLETE CBC W/AUTO DIFF WBC: CPT

## 2025-03-23 PROCEDURE — 84484 ASSAY OF TROPONIN QUANT: CPT

## 2025-03-23 PROCEDURE — 6360000002 HC RX W HCPCS: Performed by: EMERGENCY MEDICINE

## 2025-03-23 PROCEDURE — 85379 FIBRIN DEGRADATION QUANT: CPT

## 2025-03-23 PROCEDURE — 87651 STREP A DNA AMP PROBE: CPT

## 2025-03-23 PROCEDURE — 83880 ASSAY OF NATRIURETIC PEPTIDE: CPT

## 2025-03-23 PROCEDURE — 2580000003 HC RX 258: Performed by: EMERGENCY MEDICINE

## 2025-03-23 PROCEDURE — 82803 BLOOD GASES ANY COMBINATION: CPT

## 2025-03-23 PROCEDURE — 94660 CPAP INITIATION&MGMT: CPT

## 2025-03-23 PROCEDURE — 99285 EMERGENCY DEPT VISIT HI MDM: CPT

## 2025-03-23 PROCEDURE — 85730 THROMBOPLASTIN TIME PARTIAL: CPT

## 2025-03-23 PROCEDURE — 2700000000 HC OXYGEN THERAPY PER DAY

## 2025-03-23 RX ORDER — HEPARIN SODIUM 10000 [USP'U]/100ML
5-30 INJECTION, SOLUTION INTRAVENOUS CONTINUOUS
Status: DISCONTINUED | OUTPATIENT
Start: 2025-03-23 | End: 2025-03-23

## 2025-03-23 RX ORDER — HEPARIN SODIUM 1000 [USP'U]/ML
80 INJECTION, SOLUTION INTRAVENOUS; SUBCUTANEOUS PRN
Status: DISCONTINUED | OUTPATIENT
Start: 2025-03-23 | End: 2025-03-23

## 2025-03-23 RX ORDER — ONDANSETRON 2 MG/ML
4 INJECTION INTRAMUSCULAR; INTRAVENOUS EVERY 6 HOURS PRN
Status: DISCONTINUED | OUTPATIENT
Start: 2025-03-23 | End: 2025-03-26 | Stop reason: HOSPADM

## 2025-03-23 RX ORDER — FUROSEMIDE 10 MG/ML
20 INJECTION INTRAMUSCULAR; INTRAVENOUS DAILY
Status: DISCONTINUED | OUTPATIENT
Start: 2025-03-24 | End: 2025-03-26 | Stop reason: HOSPADM

## 2025-03-23 RX ORDER — POTASSIUM CHLORIDE 7.45 MG/ML
10 INJECTION INTRAVENOUS PRN
Status: DISCONTINUED | OUTPATIENT
Start: 2025-03-23 | End: 2025-03-26 | Stop reason: HOSPADM

## 2025-03-23 RX ORDER — ENOXAPARIN SODIUM 100 MG/ML
40 INJECTION SUBCUTANEOUS DAILY
Status: DISCONTINUED | OUTPATIENT
Start: 2025-03-24 | End: 2025-03-26 | Stop reason: HOSPADM

## 2025-03-23 RX ORDER — ONDANSETRON 4 MG/1
4 TABLET, ORALLY DISINTEGRATING ORAL EVERY 8 HOURS PRN
Status: DISCONTINUED | OUTPATIENT
Start: 2025-03-23 | End: 2025-03-26 | Stop reason: HOSPADM

## 2025-03-23 RX ORDER — IOPAMIDOL 755 MG/ML
75 INJECTION, SOLUTION INTRAVASCULAR
Status: COMPLETED | OUTPATIENT
Start: 2025-03-23 | End: 2025-03-23

## 2025-03-23 RX ORDER — HEPARIN SODIUM 1000 [USP'U]/ML
40 INJECTION, SOLUTION INTRAVENOUS; SUBCUTANEOUS PRN
Status: DISCONTINUED | OUTPATIENT
Start: 2025-03-23 | End: 2025-03-23

## 2025-03-23 RX ORDER — SODIUM CHLORIDE 0.9 % (FLUSH) 0.9 %
5-40 SYRINGE (ML) INJECTION EVERY 12 HOURS SCHEDULED
Status: DISCONTINUED | OUTPATIENT
Start: 2025-03-23 | End: 2025-03-26 | Stop reason: HOSPADM

## 2025-03-23 RX ORDER — POLYETHYLENE GLYCOL 3350 17 G/17G
17 POWDER, FOR SOLUTION ORAL DAILY PRN
Status: DISCONTINUED | OUTPATIENT
Start: 2025-03-23 | End: 2025-03-26 | Stop reason: HOSPADM

## 2025-03-23 RX ORDER — SODIUM CHLORIDE 9 MG/ML
INJECTION, SOLUTION INTRAVENOUS PRN
Status: DISCONTINUED | OUTPATIENT
Start: 2025-03-23 | End: 2025-03-26 | Stop reason: HOSPADM

## 2025-03-23 RX ORDER — 0.9 % SODIUM CHLORIDE 0.9 %
80 INTRAVENOUS SOLUTION INTRAVENOUS ONCE
Status: COMPLETED | OUTPATIENT
Start: 2025-03-23 | End: 2025-03-23

## 2025-03-23 RX ORDER — SODIUM CHLORIDE 0.9 % (FLUSH) 0.9 %
10 SYRINGE (ML) INJECTION PRN
Status: DISCONTINUED | OUTPATIENT
Start: 2025-03-23 | End: 2025-03-26 | Stop reason: HOSPADM

## 2025-03-23 RX ORDER — SODIUM CHLORIDE 0.9 % (FLUSH) 0.9 %
5-40 SYRINGE (ML) INJECTION PRN
Status: DISCONTINUED | OUTPATIENT
Start: 2025-03-23 | End: 2025-03-23 | Stop reason: SDUPTHER

## 2025-03-23 RX ORDER — ACETAMINOPHEN 325 MG/1
650 TABLET ORAL EVERY 6 HOURS PRN
Status: DISCONTINUED | OUTPATIENT
Start: 2025-03-23 | End: 2025-03-24 | Stop reason: SDUPTHER

## 2025-03-23 RX ORDER — FUROSEMIDE 10 MG/ML
40 INJECTION INTRAMUSCULAR; INTRAVENOUS ONCE
Status: DISCONTINUED | OUTPATIENT
Start: 2025-03-23 | End: 2025-03-24

## 2025-03-23 RX ORDER — ACETAMINOPHEN 650 MG/1
650 SUPPOSITORY RECTAL EVERY 6 HOURS PRN
Status: DISCONTINUED | OUTPATIENT
Start: 2025-03-23 | End: 2025-03-26 | Stop reason: HOSPADM

## 2025-03-23 RX ORDER — 0.9 % SODIUM CHLORIDE 0.9 %
250 INTRAVENOUS SOLUTION INTRAVENOUS ONCE
Status: COMPLETED | OUTPATIENT
Start: 2025-03-23 | End: 2025-03-23

## 2025-03-23 RX ORDER — POTASSIUM CHLORIDE 1500 MG/1
40 TABLET, EXTENDED RELEASE ORAL PRN
Status: DISCONTINUED | OUTPATIENT
Start: 2025-03-23 | End: 2025-03-26 | Stop reason: HOSPADM

## 2025-03-23 RX ORDER — MAGNESIUM SULFATE 1 G/100ML
1000 INJECTION INTRAVENOUS PRN
Status: DISCONTINUED | OUTPATIENT
Start: 2025-03-23 | End: 2025-03-26 | Stop reason: HOSPADM

## 2025-03-23 RX ORDER — SODIUM CHLORIDE 0.9 % (FLUSH) 0.9 %
5-40 SYRINGE (ML) INJECTION EVERY 12 HOURS SCHEDULED
Status: DISCONTINUED | OUTPATIENT
Start: 2025-03-23 | End: 2025-03-23 | Stop reason: SDUPTHER

## 2025-03-23 RX ORDER — HEPARIN SODIUM 1000 [USP'U]/ML
80 INJECTION, SOLUTION INTRAVENOUS; SUBCUTANEOUS ONCE
Status: DISCONTINUED | OUTPATIENT
Start: 2025-03-23 | End: 2025-03-23

## 2025-03-23 RX ORDER — SODIUM CHLORIDE 9 MG/ML
INJECTION, SOLUTION INTRAVENOUS PRN
Status: DISCONTINUED | OUTPATIENT
Start: 2025-03-23 | End: 2025-03-23 | Stop reason: SDUPTHER

## 2025-03-23 RX ADMIN — AZITHROMYCIN MONOHYDRATE 500 MG: 500 INJECTION, POWDER, LYOPHILIZED, FOR SOLUTION INTRAVENOUS at 23:26

## 2025-03-23 RX ADMIN — SODIUM CHLORIDE 250 ML: 0.9 INJECTION, SOLUTION INTRAVENOUS at 23:18

## 2025-03-23 RX ADMIN — WATER 1000 MG: 1 INJECTION INTRAMUSCULAR; INTRAVENOUS; SUBCUTANEOUS at 23:19

## 2025-03-23 RX ADMIN — SODIUM CHLORIDE 80 ML: 0.9 INJECTION, SOLUTION INTRAVENOUS at 22:16

## 2025-03-23 RX ADMIN — IOPAMIDOL 75 ML: 755 INJECTION, SOLUTION INTRAVENOUS at 22:16

## 2025-03-23 RX ADMIN — SODIUM CHLORIDE, PRESERVATIVE FREE 10 ML: 5 INJECTION INTRAVENOUS at 22:16

## 2025-03-24 ENCOUNTER — APPOINTMENT (OUTPATIENT)
Dept: INTERVENTIONAL RADIOLOGY/VASCULAR | Age: 75
DRG: 291 | End: 2025-03-24
Payer: MEDICARE

## 2025-03-24 ENCOUNTER — APPOINTMENT (OUTPATIENT)
Dept: VASCULAR LAB | Age: 75
DRG: 291 | End: 2025-03-24
Attending: INTERNAL MEDICINE
Payer: MEDICARE

## 2025-03-24 ENCOUNTER — APPOINTMENT (OUTPATIENT)
Dept: GENERAL RADIOLOGY | Age: 75
DRG: 291 | End: 2025-03-24
Payer: MEDICARE

## 2025-03-24 ENCOUNTER — APPOINTMENT (OUTPATIENT)
Age: 75
DRG: 291 | End: 2025-03-24
Attending: FAMILY MEDICINE
Payer: MEDICARE

## 2025-03-24 LAB
ALBUMIN FLD-MCNC: 1.4 G/DL
ANION GAP SERPL CALCULATED.3IONS-SCNC: 13 MMOL/L (ref 9–16)
BACTERIA URNS QL MICRO: ABNORMAL
BASOPHILS # BLD: 0.04 K/UL (ref 0–0.2)
BASOPHILS NFR BLD: 1 % (ref 0–2)
BILIRUB UR QL STRIP: NEGATIVE
BUN SERPL-MCNC: 18 MG/DL (ref 8–23)
CALCIUM SERPL-MCNC: 9.8 MG/DL (ref 8.8–10.2)
CASTS #/AREA URNS LPF: ABNORMAL /LPF
CASTS #/AREA URNS LPF: ABNORMAL /LPF
CHLORIDE SERPL-SCNC: 108 MMOL/L (ref 98–107)
CLARITY UR: CLEAR
CO2 SERPL-SCNC: 18 MMOL/L (ref 20–31)
COLOR UR: YELLOW
CREAT SERPL-MCNC: 1.2 MG/DL (ref 0.5–0.9)
D DIMER PPP FEU-MCNC: 2.01 UG/ML FEU (ref 0–0.59)
ECHO AO ROOT DIAM: 3 CM
ECHO AO ROOT INDEX: 1.66 CM/M2
ECHO AV MEAN GRADIENT: 3 MMHG
ECHO AV MEAN VELOCITY: 0.8 M/S
ECHO AV PEAK GRADIENT: 7 MMHG
ECHO AV PEAK VELOCITY: 1.4 M/S
ECHO AV VELOCITY RATIO: 0.71
ECHO AV VTI: 21.7 CM
ECHO BSA: 1.85 M2
ECHO BSA: 1.85 M2
ECHO EST RA PRESSURE: 3 MMHG
ECHO LA AREA 2C: 23.4 CM2
ECHO LA AREA 4C: 18 CM2
ECHO LA DIAMETER INDEX: 1.88 CM/M2
ECHO LA DIAMETER: 3.4 CM
ECHO LA MAJOR AXIS: 5.4 CM
ECHO LA MINOR AXIS: 5.4 CM
ECHO LA TO AORTIC ROOT RATIO: 1.13
ECHO LA VOL BP: 61 ML (ref 22–52)
ECHO LA VOL MOD A2C: 82 ML (ref 22–52)
ECHO LA VOL MOD A4C: 45 ML (ref 22–52)
ECHO LA VOL/BSA BIPLANE: 34 ML/M2 (ref 16–34)
ECHO LA VOLUME INDEX MOD A2C: 45 ML/M2 (ref 16–34)
ECHO LA VOLUME INDEX MOD A4C: 25 ML/M2 (ref 16–34)
ECHO LV E' LATERAL VELOCITY: 8.27 CM/S
ECHO LV E' SEPTAL VELOCITY: 6.96 CM/S
ECHO LV EDV A2C: 111 ML
ECHO LV EDV A4C: 107 ML
ECHO LV EDV INDEX A4C: 59 ML/M2
ECHO LV EDV NDEX A2C: 61 ML/M2
ECHO LV EF PHYSICIAN: 35 %
ECHO LV EJECTION FRACTION A2C: 31 %
ECHO LV EJECTION FRACTION A4C: 40 %
ECHO LV EJECTION FRACTION BIPLANE: 35 % (ref 55–100)
ECHO LV ESV A2C: 76 ML
ECHO LV ESV A4C: 65 ML
ECHO LV ESV INDEX A2C: 42 ML/M2
ECHO LV ESV INDEX A4C: 36 ML/M2
ECHO LV FRACTIONAL SHORTENING: 14 % (ref 28–44)
ECHO LV INTERNAL DIMENSION DIASTOLE INDEX: 2.43 CM/M2
ECHO LV INTERNAL DIMENSION DIASTOLIC: 4.4 CM (ref 3.9–5.3)
ECHO LV INTERNAL DIMENSION SYSTOLIC INDEX: 2.1 CM/M2
ECHO LV INTERNAL DIMENSION SYSTOLIC: 3.8 CM
ECHO LV IVSD: 1.1 CM (ref 0.6–0.9)
ECHO LV MASS 2D: 168.9 G (ref 67–162)
ECHO LV MASS INDEX 2D: 93.3 G/M2 (ref 43–95)
ECHO LV POSTERIOR WALL DIASTOLIC: 1.1 CM (ref 0.6–0.9)
ECHO LV RELATIVE WALL THICKNESS RATIO: 0.5
ECHO LVOT PEAK GRADIENT: 4 MMHG
ECHO LVOT PEAK VELOCITY: 1 M/S
ECHO MV A VELOCITY: 1.19 M/S
ECHO MV E DECELERATION TIME (DT): 113 MS
ECHO MV E VELOCITY: 1.06 M/S
ECHO MV E/A RATIO: 0.89
ECHO MV E/E' LATERAL: 12.82
ECHO MV E/E' RATIO (AVERAGED): 14.02
ECHO MV E/E' SEPTAL: 15.23
ECHO RIGHT VENTRICULAR SYSTOLIC PRESSURE (RVSP): 38 MMHG
ECHO TV REGURGITANT MAX VELOCITY: 2.95 M/S
ECHO TV REGURGITANT PEAK GRADIENT: 35 MMHG
EKG ATRIAL RATE: 116 BPM
EKG ATRIAL RATE: 91 BPM
EKG ATRIAL RATE: 95 BPM
EKG P AXIS: 38 DEGREES
EKG P AXIS: 57 DEGREES
EKG P AXIS: 58 DEGREES
EKG P-R INTERVAL: 126 MS
EKG P-R INTERVAL: 134 MS
EKG P-R INTERVAL: 140 MS
EKG Q-T INTERVAL: 356 MS
EKG Q-T INTERVAL: 382 MS
EKG Q-T INTERVAL: 390 MS
EKG QRS DURATION: 132 MS
EKG QRS DURATION: 134 MS
EKG QRS DURATION: 136 MS
EKG QTC CALCULATION (BAZETT): 469 MS
EKG QTC CALCULATION (BAZETT): 490 MS
EKG QTC CALCULATION (BAZETT): 494 MS
EKG R AXIS: 29 DEGREES
EKG R AXIS: 33 DEGREES
EKG R AXIS: 37 DEGREES
EKG T AXIS: -155 DEGREES
EKG T AXIS: -161 DEGREES
EKG T AXIS: -171 DEGREES
EKG VENTRICULAR RATE: 116 BPM
EKG VENTRICULAR RATE: 91 BPM
EKG VENTRICULAR RATE: 95 BPM
EOSINOPHIL # BLD: 0.06 K/UL (ref 0–0.44)
EOSINOPHILS RELATIVE PERCENT: 1 % (ref 1–4)
EPI CELLS #/AREA URNS HPF: ABNORMAL /HPF (ref 0–5)
ERYTHROCYTE [DISTWIDTH] IN BLOOD BY AUTOMATED COUNT: 18.5 % (ref 11.8–14.4)
FLUAV RNA RESP QL NAA+PROBE: NOT DETECTED
FLUBV RNA RESP QL NAA+PROBE: NOT DETECTED
GFR, ESTIMATED: 47 ML/MIN/1.73M2
GLUCOSE FLD-MCNC: 126 MG/DL
GLUCOSE SERPL-MCNC: 111 MG/DL (ref 82–115)
GLUCOSE UR STRIP-MCNC: NEGATIVE MG/DL
HCT VFR BLD AUTO: 32.1 % (ref 36.3–47.1)
HGB BLD-MCNC: 9.7 G/DL (ref 11.9–15.1)
HGB UR QL STRIP.AUTO: NEGATIVE
IMM GRANULOCYTES # BLD AUTO: 0.02 K/UL (ref 0–0.3)
IMM GRANULOCYTES NFR BLD: 0 %
INR PPP: 1.2
KETONES UR STRIP-MCNC: NEGATIVE MG/DL
LDH FLD L TO P-CCNC: 51 U/L
LEUKOCYTE ESTERASE UR QL STRIP: ABNORMAL
LYMPHOCYTES NFR BLD: 1.17 K/UL (ref 1.1–3.7)
LYMPHOCYTES RELATIVE PERCENT: 16 % (ref 24–43)
MCH RBC QN AUTO: 27.9 PG (ref 25.2–33.5)
MCHC RBC AUTO-ENTMCNC: 30.2 G/DL (ref 28.4–34.8)
MCV RBC AUTO: 92.2 FL (ref 82.6–102.9)
MONOCYTES NFR BLD: 0.53 K/UL (ref 0.1–1.2)
MONOCYTES NFR BLD: 7 % (ref 3–12)
NEUTROPHILS NFR BLD: 76 % (ref 36–65)
NEUTS SEG NFR BLD: 5.65 K/UL (ref 1.5–8.1)
NITRITE UR QL STRIP: NEGATIVE
NRBC BLD-RTO: 0 PER 100 WBC
PH UR STRIP: 5.5 [PH] (ref 5–8)
PLATELET # BLD AUTO: 337 K/UL (ref 138–453)
PMV BLD AUTO: 10.2 FL (ref 8.1–13.5)
POTASSIUM SERPL-SCNC: 4.5 MMOL/L (ref 3.7–5.3)
PROT UR STRIP-MCNC: NEGATIVE MG/DL
PROTHROMBIN TIME: 15.1 SEC (ref 11.5–14.2)
RBC # BLD AUTO: 3.48 M/UL (ref 3.95–5.11)
RBC # BLD: ABNORMAL 10*6/UL
RBC #/AREA URNS HPF: ABNORMAL /HPF (ref 0–2)
SARS-COV-2 RNA RESP QL NAA+PROBE: NOT DETECTED
SODIUM SERPL-SCNC: 139 MMOL/L (ref 136–145)
SOURCE: NORMAL
SP GR UR STRIP: 1.01 (ref 1–1.03)
SPECIMEN DESCRIPTION: NORMAL
SPECIMEN SOURCE: NORMAL
SPECIMEN TYPE: NORMAL
STREP A, MOLECULAR: NEGATIVE
TROPONIN I SERPL HS-MCNC: 54 NG/L (ref 0–14)
TROPONIN I SERPL HS-MCNC: 92 NG/L (ref 0–14)
TROPONIN I SERPL HS-MCNC: 96 NG/L (ref 0–14)
UROBILINOGEN UR STRIP-ACNC: NORMAL EU/DL (ref 0–1)
WBC #/AREA URNS HPF: ABNORMAL /HPF (ref 0–5)
WBC OTHER # BLD: 7.5 K/UL (ref 3.5–11.3)

## 2025-03-24 PROCEDURE — 94660 CPAP INITIATION&MGMT: CPT

## 2025-03-24 PROCEDURE — 83615 LACTATE (LD) (LDH) ENZYME: CPT

## 2025-03-24 PROCEDURE — 88305 TISSUE EXAM BY PATHOLOGIST: CPT

## 2025-03-24 PROCEDURE — 71045 X-RAY EXAM CHEST 1 VIEW: CPT

## 2025-03-24 PROCEDURE — 93306 TTE W/DOPPLER COMPLETE: CPT

## 2025-03-24 PROCEDURE — 6370000000 HC RX 637 (ALT 250 FOR IP): Performed by: NURSE PRACTITIONER

## 2025-03-24 PROCEDURE — 87205 SMEAR GRAM STAIN: CPT

## 2025-03-24 PROCEDURE — 93010 ELECTROCARDIOGRAM REPORT: CPT | Performed by: INTERNAL MEDICINE

## 2025-03-24 PROCEDURE — 32555 ASPIRATE PLEURA W/ IMAGING: CPT

## 2025-03-24 PROCEDURE — 93970 EXTREMITY STUDY: CPT

## 2025-03-24 PROCEDURE — 87070 CULTURE OTHR SPECIMN AEROBIC: CPT

## 2025-03-24 PROCEDURE — 85025 COMPLETE CBC W/AUTO DIFF WBC: CPT

## 2025-03-24 PROCEDURE — 0W993ZZ DRAINAGE OF RIGHT PLEURAL CAVITY, PERCUTANEOUS APPROACH: ICD-10-PCS | Performed by: RADIOLOGY

## 2025-03-24 PROCEDURE — 93306 TTE W/DOPPLER COMPLETE: CPT | Performed by: INTERNAL MEDICINE

## 2025-03-24 PROCEDURE — 88112 CYTOPATH CELL ENHANCE TECH: CPT

## 2025-03-24 PROCEDURE — 6360000002 HC RX W HCPCS: Performed by: NURSE PRACTITIONER

## 2025-03-24 PROCEDURE — 83986 ASSAY PH BODY FLUID NOS: CPT

## 2025-03-24 PROCEDURE — 6370000000 HC RX 637 (ALT 250 FOR IP): Performed by: FAMILY MEDICINE

## 2025-03-24 PROCEDURE — 87206 SMEAR FLUORESCENT/ACID STAI: CPT

## 2025-03-24 PROCEDURE — C1729 CATH, DRAINAGE: HCPCS

## 2025-03-24 PROCEDURE — 2500000003 HC RX 250 WO HCPCS: Performed by: NURSE PRACTITIONER

## 2025-03-24 PROCEDURE — 2000000000 HC ICU R&B

## 2025-03-24 PROCEDURE — 2700000000 HC OXYGEN THERAPY PER DAY

## 2025-03-24 PROCEDURE — 81001 URINALYSIS AUTO W/SCOPE: CPT

## 2025-03-24 PROCEDURE — 87075 CULTR BACTERIA EXCEPT BLOOD: CPT

## 2025-03-24 PROCEDURE — 87015 SPECIMEN INFECT AGNT CONCNTJ: CPT

## 2025-03-24 PROCEDURE — 93005 ELECTROCARDIOGRAM TRACING: CPT | Performed by: EMERGENCY MEDICINE

## 2025-03-24 PROCEDURE — 93970 EXTREMITY STUDY: CPT | Performed by: SURGERY

## 2025-03-24 PROCEDURE — 80048 BASIC METABOLIC PNL TOTAL CA: CPT

## 2025-03-24 PROCEDURE — 87102 FUNGUS ISOLATION CULTURE: CPT

## 2025-03-24 PROCEDURE — 82945 GLUCOSE OTHER FLUID: CPT

## 2025-03-24 PROCEDURE — 94761 N-INVAS EAR/PLS OXIMETRY MLT: CPT

## 2025-03-24 PROCEDURE — 89051 BODY FLUID CELL COUNT: CPT

## 2025-03-24 PROCEDURE — 85610 PROTHROMBIN TIME: CPT

## 2025-03-24 PROCEDURE — 82042 OTHER SOURCE ALBUMIN QUAN EA: CPT

## 2025-03-24 PROCEDURE — 36415 COLL VENOUS BLD VENIPUNCTURE: CPT

## 2025-03-24 PROCEDURE — 84484 ASSAY OF TROPONIN QUANT: CPT

## 2025-03-24 RX ORDER — ACETAMINOPHEN 500 MG
1000 TABLET ORAL EVERY 6 HOURS PRN
COMMUNITY

## 2025-03-24 RX ORDER — LISINOPRIL 20 MG/1
20 TABLET ORAL DAILY
COMMUNITY

## 2025-03-24 RX ORDER — SCOPOLAMINE 1 MG/3D
1 PATCH, EXTENDED RELEASE TRANSDERMAL
Status: DISCONTINUED | OUTPATIENT
Start: 2025-03-24 | End: 2025-03-26 | Stop reason: HOSPADM

## 2025-03-24 RX ORDER — ACETAMINOPHEN 325 MG/1
650 TABLET ORAL EVERY 4 HOURS PRN
Status: DISCONTINUED | OUTPATIENT
Start: 2025-03-24 | End: 2025-03-26 | Stop reason: HOSPADM

## 2025-03-24 RX ORDER — CARVEDILOL 6.25 MG/1
6.25 TABLET ORAL 2 TIMES DAILY WITH MEALS
Status: DISCONTINUED | OUTPATIENT
Start: 2025-03-24 | End: 2025-03-26 | Stop reason: HOSPADM

## 2025-03-24 RX ADMIN — CARVEDILOL 6.25 MG: 6.25 TABLET, FILM COATED ORAL at 16:32

## 2025-03-24 RX ADMIN — SODIUM CHLORIDE, PRESERVATIVE FREE 10 ML: 5 INJECTION INTRAVENOUS at 21:01

## 2025-03-24 RX ADMIN — ONDANSETRON 4 MG: 4 TABLET, ORALLY DISINTEGRATING ORAL at 12:44

## 2025-03-24 RX ADMIN — SODIUM CHLORIDE, PRESERVATIVE FREE 10 ML: 5 INJECTION INTRAVENOUS at 09:17

## 2025-03-24 RX ADMIN — ENOXAPARIN SODIUM 40 MG: 100 INJECTION SUBCUTANEOUS at 09:18

## 2025-03-24 RX ADMIN — FUROSEMIDE 20 MG: 10 INJECTION, SOLUTION INTRAMUSCULAR; INTRAVENOUS at 16:08

## 2025-03-24 ASSESSMENT — HEART SCORE: ECG: NORMAL

## 2025-03-24 NOTE — CARE COORDINATION
Received a VM from Randi at [facility].  Patient is current and will accept Back.  PS sent to Dr. Gonzalez requesting Home health order.

## 2025-03-24 NOTE — ED NOTES
Patient able to product moderate BM. Patient assisted back to bed in stable condition without complication. No further needs verbalized at this time.

## 2025-03-24 NOTE — ED NOTES
Patient assisted to bedside commode in stable condition, patient able to produce urine specimen. Sent urine sample to lab. Patient assisted back to bed without complication. No further needs at this time.

## 2025-03-24 NOTE — DISCHARGE INSTR - COC
Continuity of Care Form    Patient Name: Ness Castañeda   :  1950  MRN:  [id number]    Admit date:  3/23/2025  Discharge date:  3/26/2025    Code Status Order: Full Code   Advance Directives:     Admitting Physician:  Lori Wu MD  PCP: Eri Akbar MD    Discharging Nurse: Lynne HALL ,RN  Discharging Hospital Unit/Room#:   Discharging Unit Phone Number: [phone]    Emergency Contact:   Extended Emergency Contact Information  Primary Emergency Contact: Manuel Castañeda  Address: [address] [place] [address]            [facility] [place] [address] [place] [address]  Home Phone: [phone]  Relation: Spouse   needed? No  Secondary Emergency Contact: Cynthia Castañeda  Address: [address] [place] [address] [place], [place] [id number] [place] [address]  Home Phone: [phone]  Relation: Child   needed? No    Past Surgical History:  Past Surgical History:   Procedure Laterality Date    BLADDER TUMOR EXCISION  2017    TUR-BT    COLONOSCOPY      DILATION AND CURETTAGE OF UTERUS      ESOPHAGOGASTRODUODENOSCOPY      PARTIAL NEPHRECTOMY Right 2017    radical neph    PARTIAL NEPHRECTOMY Right 2017    NEPHRECTOMY PARTIAL, CONVERTED TO RADICAL SI ROBOTIC, INTRAOP ULTRASOUND  performed by Agustin Garrido MD at [facility] OR    SIGMOID COLECTOMY N/A 2025    LEFT LAPAROSCOPIC ROBOTIC XI ASSITED OPEN SIGMOID RESECTION WITH TAP BLOCK performed by Fracisco Barksdale MD at [facility] OR    SKIN BIOPSY      SKIN CANCER EXCISION      nose    TONSILLECTOMY      T&A       Immunization History:     There is no immunization history on file for this patient.    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

## 2025-03-24 NOTE — CARE COORDINATION
03/24/25 0915   Readmission Assessment   Number of Days since last admission? 8-30 days   Previous Disposition Home with Home Health   Who is being Interviewed Patient   What was the patient's/caregiver's perception as to why they think they needed to return back to the hospital? Other (Comment)  (\"felt like water was in my lungs\")   Did you visit your Primary Care Physician after you left the hospital, before you returned this time? Yes   Did you see a specialist, such as Cardiac, Pulmonary, Orthopedic Physician, etc. after you left the hospital? Yes   Who advised the patient to return to the hospital? Self-referral   Does the patient report anything that got in the way of taking their medications? No   In our efforts to provide the best possible care to you and others like you, can you think of anything that we could have done to help you after you left the hospital the first time, so that you might not have needed to return so soon? Other (Comment)  (\"Nothing I can think of\")

## 2025-03-24 NOTE — H&P
Hospitalist - History & Physical      Patient: Ness Castañeda    Unit/Bed:2028/2028-01  YOB: 1950  MRN: [id number]   Acct: [id number]   PCP: Eri Akbar MD    Date of Service: Pt seen/examined on 03/24/25  and Admitted to Inpatient with expected LOS greater than two midnights due to medical therapy.     Chief Complaint:  Sob    Assessment and Plan:-  Acute Hypoxic Respiratory Failure likely 2/2 CHF Exacerbation, Unknown EF  Patient with fluid overload with pleural effusions  Patient was placed on BiPAP and has been weaned down to nasal cannula.  Critical care consulted.  I have consulted cardiology given patient's CHF exacerbation.  No history of CHF per patient.  I have ordered echocardiogram.  Telemetry monitoring, I/O, daily weights, low-salt diet.  If he does not respond well to diuresis, she will require a thoracentesis.    Elevated Troponins  Trend.  Cardiology consulted.  Patient states that she did have some evidence of funny sensation in her chest.  At this time she is asymptomatic.  Troponin was trending down but repeat troponin is elevated despite improvement in her respiratory status.  EKG with left bundle branch block which is known.    Known LBBB  Solitary kidney [H/O Renal / Bladder Cancer]  Monitor renal function.    DMII  Metformin was discontinued on previous admission due to chronic kidney disease.  ACHS checks, insulin sliding scale as needed, hypoglycemia protocol.    HTN  Patient is on Norvasc and lisinopril at home.  Monitor blood pressures.  Restart as tolerated.    Recent hospitalization with robotic assisted sigmoid resection due to Left sigmoid mass  Stable    CKD stage IIIa  Noted. At baseline      History Of Present Illness:    74-year-old female with past medical history of renal and bladder cancer status post partial right kidney nephrectomy, diabetes mellitus, hypertension, recent hospitalization with recent surgery on 2/26/2025 with robotic assisted sigmoid

## 2025-03-24 NOTE — ED PROVIDER NOTES
EMERGENCY DEPARTMENT ENCOUNTER    Pt Name: Ness Castañeda  MRN: [id number]  Birthdate 1950  Date of evaluation: 3/23/25  CHIEF COMPLAINT       Chief Complaint   Patient presents with    Respiratory Distress     HISTORY OF PRESENT ILLNESS   74-year-old female presenting to the ER with sudden onset respiratory distress.  Patient was about to have a bowel movement when she felt very short of breath.  Patient called EMS.  Patient denies using oxygen at home.    The history is provided by the patient.   Shortness of Breath  Severity:  Severe  Onset quality:  Sudden  Associated symptoms: no abdominal pain, no chest pain, no headaches, no rash and no vomiting            REVIEW OF SYSTEMS     Review of Systems   Constitutional:  Negative for activity change, appetite change and fatigue.   HENT:  Negative for facial swelling, trouble swallowing and voice change.    Eyes:  Negative for photophobia and pain.   Respiratory:  Positive for shortness of breath. Negative for chest tightness.    Cardiovascular:  Positive for palpitations. Negative for chest pain.   Gastrointestinal:  Negative for abdominal pain, nausea and vomiting.   Genitourinary:  Negative for dysuria and urgency.   Musculoskeletal:  Negative for arthralgias and back pain.   Skin:  Negative for color change and rash.   Neurological:  Negative for dizziness, syncope and headaches.   Psychiatric/Behavioral:  Negative for behavioral problems and hallucinations.      PASTMEDICAL HISTORY     Past Medical History:   Diagnosis Date    Arthritis     Bladder tumor 12/2016    Blood in urine 12/2016    Cancer (HCC) 12/2016    BLADDER-REMOVED NO CHEMO OR RADIATION    Cancer (HCC) 12/2016    RT KIDNEY-PARTIAL REMOVAL NO CHEMO OR RADIATION    Dental crowns present     UPPER AND LOWER    Diabetes mellitus (HCC) 2011    ON ORAL MED    Diverticulitis     Hypertension 2015    ON RX STARTING 01/2017    Irregular heart beat     pt evaluated by cardiology 2/19/2025 - cleared for

## 2025-03-24 NOTE — CARE COORDINATION
Case Management Assessment  Initial Evaluation    Date/Time of Evaluation: 3/24/2025 9:19 AM  Assessment Completed by: Cheryle Hickey    If patient is discharged prior to next notation, then this note serves as note for discharge by case management.    Patient Name: Ness Castañeda                   YOB: 1950  Diagnosis: Acute pulmonary edema (HCC) [J81.0]  Respiratory distress [R06.03]  Pulmonary edema with congestive heart failure (HCC) [I50.1]                   Date / Time: 3/23/2025  9:40 PM    Patient Admission Status: Inpatient   Readmission Risk (Low < 19, Mod (19-27), High > 27): Readmission Risk Score: 16.7    Current PCP: Eri Akbar MD  PCP verified by CM? (P) Yes    Chart Reviewed: Yes      History Provided by: Patient  Patient Orientation: Alert and Oriented    Patient Cognition: Alert    Hospitalization in the last 30 days (Readmission):  Yes    If yes, Readmission Assessment in CM Navigator will be completed.    Advance Directives:      Code Status: Full Code   Patient's Primary Decision Maker is: (P) Named in Scanned ACP Document      Discharge Planning:    Patient lives with: (P) Spouse/Significant Other, Children (fatoumata Marie) Type of Home: (P) House  Primary Care Giver: Self  Patient Support Systems include: Spouse/Significant Other, Children, Friends/Neighbors   Current Financial resources: (P) Medicare  Current community resources:    Current services prior to admission: (P) Durable Medical Equipment            Current DME: (P) Walker, Wheelchair            Type of Home Care services:  (P) Nursing Services    ADLS  Prior functional level: (P) Independent in ADLs/IADLs  Current functional level: (P) Independent in ADLs/IADLs    PT AM-PAC:   /24  OT AM-PAC:   /24    Family can provide assistance at DC: (P) Yes  Would you like Case Management to discuss the discharge plan with any other family members/significant others, and if so, who? (P) No  Plans to Return to Present Housing:

## 2025-03-24 NOTE — PROGRESS NOTES
4 Eyes Skin Assessment     NAME:  Ness Castañeda  YOB: 1950  MEDICAL RECORD NUMBER:  [id number]    The patient is being assessed for  Admission    I agree that at least one RN has performed a thorough Head to Toe Skin Assessment on the patient. ALL assessment sites listed below have been assessed.      Areas assessed by both nurses:    Head, Face, Ears, Shoulders, Back, Chest, Arms, Elbows, Hands, Sacrum. Buttock, Coccyx, Ischium, and Legs. Feet and Heels        Does the Patient have a Wound? Previous abdomen surgery 2/26/25 documented in LDAs. Patient currently still has steri strips. Site intact. Healing well. No signs of redness or drainage.       Sorin Prevention initiated by RN: No  Wound Care Orders initiated by RN: No    Pressure Injury (Stage 3,4, Unstageable, DTI, NWPT, and Complex wounds) if present, place Wound referral order by RN under : No    New Ostomies, if present place, Ostomy referral order under : No     Nurse 1 eSignature: Electronically signed by Anabell Elias RN on 3/24/25 at 8:25 AM EDT    **SHARE this note so that the co-signing nurse can place an eSignature**    Nurse 2 eSignature: Electronically signed by Columba Garrison RN on 3/24/25 at 9:53 AM EDT

## 2025-03-24 NOTE — FLOWSHEET NOTE
03/24/25 0730   Vitals   Pulse 91   BP (!) 142/102   MAP (Calculated) 115   MAP (mmHg) 115   Oxygen Therapy   SpO2 98 %   O2 Device Nasal cannula   O2 Flow Rate (L/min) 3 L/min         Patient admitted to CVICU room 2028 from ER. Patient able to stand and pivot to bed independently. Patient off bipap on arrival to floor. On 3L NC. Tele applied. Patient educated on safety instructions and plan of care. Call light education provided. Patient in agreement and verbalized understanding. Call light within reach.

## 2025-03-24 NOTE — FLOWSHEET NOTE
03/24/25 1543   Vitals   Pulse 88   /76   MAP (Calculated) 97   MAP (mmHg) 96   Oxygen Therapy   SpO2 95 %       Patient back from IR at this time post right thora. 1500mls off. IR wanting to review for tomorrow for potential of doing left side.

## 2025-03-24 NOTE — ED NOTES
Gave patient lemon sticks and chap stick for comfort. Patient tolerated. Daughter at bedside. Call light in reach. Denies needs at this time.

## 2025-03-24 NOTE — FLOWSHEET NOTE
Patient brought down to IR for right sided thoracentesis. Patient had 1500 mls of clear ellis fluid drained and sent for testing. Vitals stable and charted. Chest xray ordered results pending. Patient back to room in no distress reported off to patient's nurse.

## 2025-03-24 NOTE — CONSULTS
Reason for Consult: CHF/cardiomyopathy  Requesting Physician: Lori Wu MD    CHIEF COMPLAINT: Shortness of breath    History Obtained From:  patient, family at the bedside, RN and electronic medical record    HISTORY OF PRESENT ILLNESS:      The patient is a 74 y.o. female with significant past medical history of kidney cancer, diabetes, kidney disease and had kidney removed due to renal cancer in the past who presents with shortness of breath.  The patient is a known patient to Dr. Aldrich who saw the patient for preop clearance in February 2025.  She was noted to have a left bundle branch block on her preoperative EKG.  The patient denies having chest pain.  Postoperatively she was at home and developed sudden shortness of breath with exertion and found to have effusions and CHF exacerbation on chest x-ray on admission.  An echocardiogram that was done today showed moderate LV systolic dysfunction with moderate to severe mitral regurgitation.  The patient denies having any known history of valvular heart disease or cardiomyopathy.  She is reluctant to take any Lasix because she is afraid of hurting her kidneys due to solitary kidney.  She plans for thoracentesis in the near future.  She is currently on oxygen.  Family was at the bedside when I saw her.  She continues to deny any chest pain or pressure.  She denies any palpitations, syncope or near syncope.  She reports her brother has heart problems but not known CAD and she is not sure exactly what.    Past Medical History:    Past Medical History:   Diagnosis Date    Arthritis     Bladder tumor 12/2016    Blood in urine 12/2016    Cancer (HCC) 12/2016    BLADDER-REMOVED NO CHEMO OR RADIATION    Cancer (HCC) 12/2016    RT KIDNEY-PARTIAL REMOVAL NO CHEMO OR RADIATION    Dental crowns present     UPPER AND LOWER    Diabetes mellitus (HCC) 2011    ON ORAL MED    Diverticulitis     Hypertension 2015    ON RX STARTING 01/2017    Irregular heart beat

## 2025-03-24 NOTE — CONSULTS
Pulmonary Medicine and Critical Care Consult    Patient - Ness Castañeda   MRN -  [id number]   [id number]   - 1950      Date of Admission -  3/23/2025  9:40 PM  Date of evaluation -  3/24/2025  Room - -   Hospital Day - 1  Consulting - Lori Wu MD Primary Care Physician - Eri Akbar MD     Reason for Consult      Respiratory failure  Assessment/recommendations   Acute hypoxic respiratory failure requiring BiPAP  Oxygen by nasal cannula  Incentive spirometry every hour while awake  BIPAP support as needed  Home O2 eval before discharge    Pulmonary edema/pleural effusion/atelectasis  Echocardiogram  Diuretic  Monitor urine output  Off Rocephin Zithromax  Right thoracentesis by IR    Elevated D-dimer  Lower extremity venous Dopplers    CKD status post partial right nephrectomy with history of bladder CA  Monitor creatinine consider nephrology evaluation    Status post recent sigmoid resection   Wound care    diabetes hypertension   Discussed with daughter at bedside  Discussed with RN  peptic ulcer disease prophylaxis  DVT prophylaxis  Problem List      Patient Active Problem List   Diagnosis    Gross hematuria    Bladder tumor    Malignant neoplasm of lateral wall of urinary bladder (HCC)    Malignant neoplasm of right kidney (HCC)    Diverticular disease    Pulmonary edema with congestive heart failure (HCC)       HPI     Ness Castañeda is 74 y.o.,  female, previous medical history hypertension pdiabetes CKD with solitary kidney status post partial right kidney nephrectomy bladder cancer.  Patient had recent hospitalization with recent surgery on 2025 with robotic assisted sigmoid resection complicated by fluid retention postop.  She presented who came into the hospital for complaints of shortness of breath associated with palpitation.  She had desaturation down to 70% per EMS evaluation she required BiPAP on presentation to the ED.  She now reports that

## 2025-03-24 NOTE — BRIEF OP NOTE
Brief Postoperative Note for Thoracentesis    Ness Castañeda  YOB: 1950  [id number]    Pre-operative Diagnosis: Right Pleural effusion      Post-operative Diagnosis: Same    Procedure: Ultrasound guided Thoracentesis    Anesthesia: 1% Lidocaine     Surgeons/Assistants: JANAE MARTINEZ MD    Complications: None    Specimens: were obtained    Ultrasound guided right thoracentesis performed. 1500 ml clear yellow fluid obtained. Dressing applied.  Chest x-ray ordered.        Electronically signed by JANAE MARTINEZ MD on 3/24/2025 at 3:25 PM

## 2025-03-25 ENCOUNTER — APPOINTMENT (OUTPATIENT)
Dept: GENERAL RADIOLOGY | Age: 75
DRG: 291 | End: 2025-03-25
Payer: MEDICARE

## 2025-03-25 ENCOUNTER — TRANSCRIBE ORDERS (OUTPATIENT)
Dept: ADMINISTRATIVE | Age: 75
End: 2025-03-25

## 2025-03-25 DIAGNOSIS — M54.2 CERVICALGIA: Primary | ICD-10-CM

## 2025-03-25 LAB
ANION GAP SERPL CALCULATED.3IONS-SCNC: 9 MMOL/L (ref 9–16)
APPEARANCE FLD: CLEAR
BASOPHILS # BLD: 0.06 K/UL (ref 0–0.2)
BASOPHILS NFR BLD: 1 % (ref 0–2)
BODY FLD TYPE: NORMAL
BUN SERPL-MCNC: 16 MG/DL (ref 8–23)
CALCIUM SERPL-MCNC: 9.7 MG/DL (ref 8.8–10.2)
CASE NUMBER:: NORMAL
CHLORIDE SERPL-SCNC: 108 MMOL/L (ref 98–107)
CLOT CHECK: NORMAL
CO2 SERPL-SCNC: 22 MMOL/L (ref 20–31)
COLOR FLD: YELLOW
CREAT SERPL-MCNC: 1.3 MG/DL (ref 0.5–0.9)
EOSINOPHIL # BLD: 0.34 K/UL (ref 0–0.44)
EOSINOPHILS RELATIVE PERCENT: 6 % (ref 1–4)
ERYTHROCYTE [DISTWIDTH] IN BLOOD BY AUTOMATED COUNT: 18.6 % (ref 11.8–14.4)
GFR, ESTIMATED: 44 ML/MIN/1.73M2
GLUCOSE BLD-MCNC: 102 MG/DL (ref 65–105)
GLUCOSE SERPL-MCNC: 94 MG/DL (ref 82–115)
HCT VFR BLD AUTO: 31.1 % (ref 36.3–47.1)
HGB BLD-MCNC: 9.3 G/DL (ref 11.9–15.1)
IMM GRANULOCYTES # BLD AUTO: 0.02 K/UL (ref 0–0.3)
IMM GRANULOCYTES NFR BLD: 0 %
LYMPHOCYTES NFR BLD: 1.59 K/UL (ref 1.1–3.7)
LYMPHOCYTES NFR FLD: 29 %
LYMPHOCYTES RELATIVE PERCENT: 29 % (ref 24–43)
MCH RBC QN AUTO: 28.1 PG (ref 25.2–33.5)
MCHC RBC AUTO-ENTMCNC: 29.9 G/DL (ref 28.4–34.8)
MCV RBC AUTO: 94 FL (ref 82.6–102.9)
MESOTHELIAL CELLS BODY FLUID: 5 %
MICROORGANISM SPEC CULT: NORMAL
MICROORGANISM/AGENT SPEC: ABNORMAL
MICROORGANISM/AGENT SPEC: ABNORMAL
MICROORGANISM/AGENT SPEC: NORMAL
MONOCYTES NFR BLD: 0.46 K/UL (ref 0.1–1.2)
MONOCYTES NFR BLD: 8 % (ref 3–12)
MONOCYTES NFR FLD: 51 %
NEUTROPHILS NFR BLD: 56 % (ref 36–65)
NEUTROPHILS NFR FLD: 11 %
NEUTS SEG NFR BLD: 3.11 K/UL (ref 1.5–8.1)
NRBC BLD-RTO: 0 PER 100 WBC
NUC CELL # FLD: 102 CELLS/UL
PH FLUID: 8
PLATELET # BLD AUTO: 305 K/UL (ref 138–453)
PMV BLD AUTO: 10.1 FL (ref 8.1–13.5)
POTASSIUM SERPL-SCNC: 4.6 MMOL/L (ref 3.7–5.3)
RBC # BLD AUTO: 3.31 M/UL (ref 3.95–5.11)
RBC # BLD: ABNORMAL 10*6/UL
RBC # FLD: <3000 CELLS/UL
SERVICE CMNT-IMP: ABNORMAL
SERVICE CMNT-IMP: NORMAL
SODIUM SERPL-SCNC: 139 MMOL/L (ref 136–145)
SPECIMEN DESCRIPTION: ABNORMAL
SPECIMEN DESCRIPTION: NORMAL
SPECIMEN DESCRIPTION: NORMAL
SPECIMEN TYPE: NORMAL
WBC OTHER # BLD: 5.6 K/UL (ref 3.5–11.3)

## 2025-03-25 PROCEDURE — 6370000000 HC RX 637 (ALT 250 FOR IP): Performed by: NURSE PRACTITIONER

## 2025-03-25 PROCEDURE — 2060000000 HC ICU INTERMEDIATE R&B

## 2025-03-25 PROCEDURE — 36415 COLL VENOUS BLD VENIPUNCTURE: CPT

## 2025-03-25 PROCEDURE — 94761 N-INVAS EAR/PLS OXIMETRY MLT: CPT

## 2025-03-25 PROCEDURE — 6360000002 HC RX W HCPCS: Performed by: NURSE PRACTITIONER

## 2025-03-25 PROCEDURE — 71045 X-RAY EXAM CHEST 1 VIEW: CPT

## 2025-03-25 PROCEDURE — 82947 ASSAY GLUCOSE BLOOD QUANT: CPT

## 2025-03-25 PROCEDURE — 2500000003 HC RX 250 WO HCPCS: Performed by: NURSE PRACTITIONER

## 2025-03-25 PROCEDURE — 97166 OT EVAL MOD COMPLEX 45 MIN: CPT

## 2025-03-25 PROCEDURE — 80048 BASIC METABOLIC PNL TOTAL CA: CPT

## 2025-03-25 PROCEDURE — 2700000000 HC OXYGEN THERAPY PER DAY

## 2025-03-25 PROCEDURE — 97530 THERAPEUTIC ACTIVITIES: CPT

## 2025-03-25 PROCEDURE — 97535 SELF CARE MNGMENT TRAINING: CPT

## 2025-03-25 PROCEDURE — 85025 COMPLETE CBC W/AUTO DIFF WBC: CPT

## 2025-03-25 RX ADMIN — FUROSEMIDE 20 MG: 10 INJECTION, SOLUTION INTRAMUSCULAR; INTRAVENOUS at 08:49

## 2025-03-25 RX ADMIN — POLYETHYLENE GLYCOL 3350 17 G: 17 POWDER, FOR SOLUTION ORAL at 08:49

## 2025-03-25 RX ADMIN — CARVEDILOL 6.25 MG: 6.25 TABLET, FILM COATED ORAL at 17:37

## 2025-03-25 RX ADMIN — CARVEDILOL 6.25 MG: 6.25 TABLET, FILM COATED ORAL at 08:49

## 2025-03-25 RX ADMIN — SODIUM CHLORIDE, PRESERVATIVE FREE 10 ML: 5 INJECTION INTRAVENOUS at 20:47

## 2025-03-25 RX ADMIN — ENOXAPARIN SODIUM 40 MG: 100 INJECTION SUBCUTANEOUS at 08:49

## 2025-03-25 ASSESSMENT — PAIN SCALES - GENERAL
PAINLEVEL_OUTOF10: 0
PAINLEVEL_OUTOF10: 2

## 2025-03-25 ASSESSMENT — PAIN DESCRIPTION - DESCRIPTORS: DESCRIPTORS: DISCOMFORT

## 2025-03-25 ASSESSMENT — PAIN DESCRIPTION - LOCATION: LOCATION: BUTTOCKS

## 2025-03-25 NOTE — PROGRESS NOTES
Hospitalist - Progress Note      Patient: Ness Castañeda    Unit/Bed:2028/2028-01  YOB: 1950  MRN: [id number]   Acct: [id number]   PCP: Eri Akbar MD    Date of Service: Pt seen/examined on 03/25/25  and Admitted to Inpatient with expected LOS greater than two midnights due to medical therapy.     Chief Complaint:  Sob    Assessment and Plan:-    Acute Hypoxic Respiratory Failure likely 2/2 CHF Exacerbation, Systolic  Patient with fluid overload with pleural effusions  Patient was placed on BiPAP and has been weaned down to nasal cannula.  Critical care consulted.  I have consulted cardiology given patient's CHF exacerbation.  No history of CHF per patient.    Telemetry monitoring, Echo with wma - cardiology following  S/p IR guided thoracentesis 3/24  On low dose IV diuretics    Elevated Troponins / Cardiomyopathy of unclear etiology  Trend.  Cardiology following. Plan outpt ischemic work up    Known LBBB  Solitary kidney [H/O Renal / Bladder Cancer]  Monitor renal function. Nephrology following    DMII  Metformin was discontinued on previous admission due to chronic kidney disease.  ACHS checks, insulin sliding scale as needed, hypoglycemia protocol.    HTN  Patient is on Norvasc and lisinopril at home.  Monitor blood pressures.  Restart as tolerated.  Lisinopril held for now as pt is concerned about diuretics and ace I    Recent hospitalization with robotic assisted sigmoid resection due to Left sigmoid mass  Stable    CKD stage IIIa  Noted. At baseline      Subjective:  Pt seen in room. No acute concerns  Tolerated thora well    History Of Present Illness:    74-year-old female with past medical history of renal and bladder cancer status post partial right kidney nephrectomy, diabetes mellitus, hypertension, recent hospitalization with recent surgery on 2/26/2025 with robotic assisted sigmoid resection who came into the hospital for complaints of respiratory distress.  Symptoms were

## 2025-03-25 NOTE — PROGRESS NOTES
Patient ambulated around unit one lap. Tolerated well. Maintained oxygen above 90% with no NC support.

## 2025-03-25 NOTE — PROGRESS NOTES
End Of Shift Note  [facility]  Summary of shift: Patient did very well this shift. Titrated off oxygen and able to ambulate around unit without NC support. Tolerated Coreg. Nephrology consulted, ok with low dose lasix for now.     Vitals:    Vitals:    03/25/25 1600 03/25/25 1608 03/25/25 1735 03/25/25 1737   BP:   119/82 119/82   Pulse: 69  74 74   Resp:       Temp:  97.9 °F (36.6 °C)     TempSrc:  Oral     SpO2: 95%      Weight:       Height:            I&O:   Intake/Output Summary (Last 24 hours) at 3/25/2025 1824  Last data filed at 3/25/2025 1633  Gross per 24 hour   Intake 365 ml   Output 500 ml   Net -135 ml       Resp Status: Room air    Ventilator Settings:     / / /FiO2 : 40 %    Critical Care IV infusions:   sodium chloride          LDA:   Peripheral IV 03/23/25 Left Antecubital (Active)   Number of days: 1       Peripheral IV 03/24/25 Distal;Left;Posterior Forearm (Active)   Number of days: 1       Incision 02/26/25 Abdomen Medial;Upper (Active)   Number of days: 27       Incision 02/26/25 Abdomen Lower;Medial (Active)   Number of days: 27

## 2025-03-25 NOTE — PROGRESS NOTES
Occupational Therapy  Facility/Department: [facility]  Occupational Therapy Initial Assessment    Name: Ness Castañeda  : 1950  MRN: [id number]  Date of Service: 3/25/2025    URIAH BAGLEY reports patient is medically stable for therapy treatment this date.    Chart reviewed prior to treatment and patient is agreeable for therapy.  All lines intact and patient positioned comfortably at end of treatment.  All patient needs addressed prior to ending therapy session.      Due to recent hospitalization and medical condition, pt would benefit from additional intermittent skilled therapy at time of discharge.  Please refer to the AM-PAC score for current functional status.         Discharge Recommendations:  Patient would benefit from continued therapy after discharge  OT Equipment Recommendations  Equipment Needed: Yes  Mobility Devices: ADL Assistive Devices  ADL Assistive Devices: Reacher;Sock-Aid Soft;Long-handled Shoe Horn;Long-handled Sponge;Emergency Alert System;Toileting - 3-in-1 Commode       Patient Diagnosis(es): The primary encounter diagnosis was Respiratory distress. Diagnoses of Acute pulmonary edema (HCC), Acute congestive heart failure, unspecified heart failure type (HCC), and Pulmonary edema with congestive heart failure (HCC) were also pertinent to this visit.  Past Medical History:  has a past medical history of Arthritis, Bladder tumor, Blood in urine, Cancer (HCC), Cancer (HCC), Dental crowns present, Diabetes mellitus (HCC), Diverticulitis, Hypertension, Irregular heart beat, Kidney disease, Prediabetes, Skin cancer, and Wears glasses.  Past Surgical History:  has a past surgical history that includes bladder tumor excision (2017); Colonoscopy (); Dilation and curettage of uterus (); Tonsillectomy (); partial nephrectomy (Right, 2017); partial nephrectomy (Right, 2017); Esophagogastroduodenoscopy; skin biopsy; Skin cancer excision; and Sigmoid Colectomy (N/A,

## 2025-03-25 NOTE — PROGRESS NOTES
Section of Cardiology  Progress Note      Date:  3/25/2025  Patient: Ness Castañeda  Admission:  3/23/2025  9:40 PM  Admit DX: Acute pulmonary edema (HCC) [J81.0]  Respiratory distress [R06.03]  Pulmonary edema with congestive heart failure (HCC) [I50.1]  Age:  74 y.o., 1950     LOS: 2 days     Reason for evaluation:   cardiomyopathy and CHF      SUBJECTIVE:     The patient was seen and examined. Notes and labs reviewed.  The patient had thoracentesis with 1500 ml out and breathing is improved. She is now on room air. Denies chest pain and palpitations. Edema is improved and she is diuresing well.    OBJECTIVE:      EXAM:   Vitals:    VITALS:  BP (!) 117/56   Pulse 69   Temp 98.2 °F (36.8 °C) (Oral)   Resp 18   Ht 1.626 m (5' 4\")   Wt 75.8 kg (167 lb)   LMP 02/24/1998   SpO2 97%   BMI 28.67 kg/m²   24HR INTAKE/OUTPUT:    Intake/Output Summary (Last 24 hours) at 3/25/2025 1413  Last data filed at 3/25/2025 0900  Gross per 24 hour   Intake 365 ml   Output 1500 ml   Net -1135 ml     CONSTITUTIONAL:  awake, alert, cooperative, no apparent distress, and appears stated age.  HEENT: Normal jugular venous pulsations.   LUNGS: decreased in bases otherwise clear, non-labored  CARDIOVASCULAR:  regular rate and rhythm, normal S1 and S2, no S3 or S4, and 2/6 holosystolic murmur at the left lower sternal border and apex  SKIN: Warm and dry.  EXTREMITIES: Mild bilateral lower extremity edema.     Current Inpatient Medications:   scopolamine  1 patch TransDERmal Q72H    carvedilol  6.25 mg Oral BID WC    sodium chloride flush  5-40 mL IntraVENous 2 times per day    enoxaparin  40 mg SubCUTAneous Daily    furosemide  20 mg IntraVENous Daily       IV Infusions (if any):   sodium chloride         Diagnostics:   Telemetry: Sinus rhythm    Labs:   CBC:  Recent Labs     03/24/25  0630 03/25/25  0555   WBC 7.5 5.6   HGB 9.7* 9.3*   HCT 32.1* 31.1*    305     Magnesium:  Recent Labs     03/23/25  2159   MG 2.1

## 2025-03-25 NOTE — PROGRESS NOTES
Spiritual Health History and Assessment/Progress Note  [facility]    (P) Initial Encounter,  ,  ,      Name: Ness Castañeda MRN: [id number]    Age: 74 y.o.     Sex: female   Language: English   Restoration: Tenriism   Pulmonary edema with congestive heart failure (HCC)     Date: 3/25/2025            Total Time Calculated: (P) 10 min              Spiritual Assessment began in [facility]        Referral/Consult From: (P) Rounding   Encounter Overview/Reason: (P) Initial Encounter  Service Provided For: (P) Patient    Miladys, Belief, Meaning:   Patient identifies as spiritual, is connected with a miladys tradition or spiritual practice, and has beliefs or practices that help with coping during difficult times  Family/Friends identify as spiritual, are connected with a miladys tradition or spiritual practice, and have beliefs or practices that help with coping during difficult times      Importance and Influence:  Patient has spiritual/personal beliefs that influence decisions regarding their health  Family/Friends have spiritual/personal beliefs that influence decisions regarding the patient's health    Community:  Patient is connected with a spiritual community and feels well-supported. Support system includes: Spouse/Partner, Children, Miladys Community, Friends, and Extended family  Family/Friends are connected with a spiritual community: and feel well-supported. Support system includes: Parent/s, Miladys Community, and Extended family    Assessment and Plan of Care:     Patient Interventions include: Affirmed coping skills/support systems  Family/Friends Interventions include: Affirmed coping skills/support systems    Patient Plan of Care: Spiritual Care available upon further referral  Family/Friends Plan of Care: Spiritual Care available upon further referral    Electronically signed by Chaplain Oh Jr on 3/25/2025 at 10:21 AM     03/25/25 1018   Encounter Summary   Encounter Overview/Reason Initial

## 2025-03-25 NOTE — PROGRESS NOTES
End Of Shift Note  [facility]  Summary of shift: Pt did well overnight, O2 weaned to 1L NC. Up to BR with walker and standby assist. VS remained stable. Followed fluid restriction.    Vitals:    Vitals:    03/25/25 0300 03/25/25 0400 03/25/25 0500 03/25/25 0600   BP: (!) 113/59 (!) 126/58 107/62 121/62   Pulse: 64 64 64 62   Resp:  18     Temp:  98 °F (36.7 °C)     TempSrc:  Temporal     SpO2: 97% 99% 98% 96%   Weight:  75.8 kg (167 lb)     Height:            I&O:   Intake/Output Summary (Last 24 hours) at 3/25/2025 0627  Last data filed at 3/24/2025 2113  Gross per 24 hour   Intake 805 ml   Output 1500 ml   Net -695 ml       Resp Status: NC @ 1L    Ventilator Settings:     / / /FiO2 : 40 %    Critical Care IV infusions:   sodium chloride          LDA:   Peripheral IV 03/23/25 Left Antecubital (Active)   Number of days: 1       Peripheral IV 03/24/25 Distal;Left;Posterior Forearm (Active)   Number of days: 1       Incision 02/26/25 Abdomen Medial;Upper (Active)   Number of days: 27       Incision 02/26/25 Abdomen Lower;Medial (Active)   Number of days: 27

## 2025-03-25 NOTE — CONSULTS
Consult Note    Reason for Consult: Elevated BUN/creatinine  Pleural effusion and respiratory distress  Hypoxemia  History of right nephrectomy  History of bladder cancer    Requesting Physician:  Reza, Mohsin Mohammad, MD    HISTORY OF PRESENT ILLNESS:    The patient is a 74 y.o. female who presents with cough, dyspnea, hypoxemia patient is known to have chronic kidney disease stage IIIb previous history of right nephrectomy for renal cell carcinoma as well as history of bladder cancer treated with BCG and fulguration patient had recent cystoscopy there was small recurrence which was treated she follows with urology her baseline GFR is around 39 mL/min according to the patient current GFR is 44 mL/min she denies any fever chills rigors nausea vomiting chest pain palpitation dysuria hematuria she received some diuretic for hypoxemia she also had some pleural effusion status post thoracocentesis 500 cc    Review Of Systems:  Constitutional: No fever, chills, lethargy, weakness or wt loss.  HEENT:  No headache, nasal discharge or sore throat.  Cardiac:  No chest pain, dyspnea, orthopnea or PND.  Chest:              No cough, phlegm or wheezing.  Abdomen:  No abdominal pain, nausea, vomiting or diarrhea.  Neuro:  No gross focal weakness, numbness, abnormal movements or seizure like activity.  Skin:   No rashes or itching.  :   No hematuria, pyuria, dysuria or flank pain.  Extremities:  No swelling or joint pains.  Endocrine: No polyuria, polydypsia, or thyroid problems.  Hematology:    No bleeding disorders, bruising or anemia.  All other ROS is negative.  Unable to obtain because he is intubated and sedated.    Past Medical History:   Diagnosis Date    Arthritis     Bladder tumor 12/2016    Blood in urine 12/2016    Cancer (HCC) 12/2016    BLADDER-REMOVED NO CHEMO OR RADIATION    Cancer (HCC) 12/2016    RT KIDNEY-PARTIAL REMOVAL NO CHEMO OR RADIATION    Dental crowns present     UPPER AND LOWER    Diabetes mellitus

## 2025-03-25 NOTE — PROGRESS NOTES
Pulmonary Critical Care Progress Note    Patient seen for the follow up of Pulmonary edema with congestive heart failure (HCC)     Subjective:    Patient has been on oxygen at 1 to 2 L was on room air yesterday.  She has improved shortness of breath ambulating to the bathroom.  Denies chest pain.  No dizziness.  Occasional dry cough.  Had thoracentesis by IR 1.2 L fluid removed.  Examination:    Vitals: /64   Pulse 70   Temp 98.2 °F (36.8 °C) (Oral)   Resp 18   Ht 1.626 m (5' 4\")   Wt 75.8 kg (167 lb)   LMP 1998   SpO2 96%   BMI 28.67 kg/m²   SpO2  Av.3 %  Min: 92 %  Max: 99 %  General appearance: alert and cooperative with exam  Neck: No JVD  Lungs: Decreased breath sound no crackles or wheezing  Heart: regular rate and rhythm, S1, S2 normal, no gallop  Abdomen: Soft, non tender, + BS  Extremities: no cyanosis or clubbing. No significant edema    LABs:    CBC:   Recent Labs     25  0630 25  0555   WBC 7.5 5.6   HGB 9.7* 9.3*   HCT 32.1* 31.1*    305     BMP:   Recent Labs     25  0630 25  0555    139   K 4.5 4.6   CO2 18* 22   BUN 18 16   CREATININE 1.2* 1.3*   LABGLOM 47* 44*   GLUCOSE 111 94     PT/INR:   Recent Labs     25  2159 25  0630   PROTIME 14.9* 15.1*   INR 1.1 1.2     APTT:  Recent Labs     25  2159   APTT 24.0       Radiology:    Chest x-ray 3/25  Stable exam demonstrating small-moderate left pleural effusion associated  with left basilar atelectasis     Mild pulmonary edema persist.        Impression/recommendations;  Acute hypoxic respiratory failure requiring BiPAP  Oxygen by nasal cannula  Incentive spirometry every hour while awake  BIPAP support as needed  Home O2 eval before discharge     Pulmonary edema/pleural effusion/atelectasis  Echocardiogram  Diuretic  Monitor urine output  Off Rocephin Zithromax  Check pleural fluid culture cytology and chemistry     Elevated D-dimer  Lower extremity venous Dopplers

## 2025-03-26 VITALS
SYSTOLIC BLOOD PRESSURE: 130 MMHG | WEIGHT: 164.46 LBS | HEIGHT: 64 IN | OXYGEN SATURATION: 94 % | HEART RATE: 69 BPM | TEMPERATURE: 98 F | RESPIRATION RATE: 18 BRPM | BODY MASS INDEX: 28.08 KG/M2 | DIASTOLIC BLOOD PRESSURE: 58 MMHG

## 2025-03-26 LAB
ANION GAP SERPL CALCULATED.3IONS-SCNC: 10 MMOL/L (ref 9–16)
BASOPHILS # BLD: 0.06 K/UL (ref 0–0.2)
BASOPHILS NFR BLD: 1 % (ref 0–2)
BUN SERPL-MCNC: 17 MG/DL (ref 8–23)
CALCIUM SERPL-MCNC: 9.4 MG/DL (ref 8.8–10.2)
CHLORIDE SERPL-SCNC: 107 MMOL/L (ref 98–107)
CO2 SERPL-SCNC: 22 MMOL/L (ref 20–31)
CREAT SERPL-MCNC: 1.3 MG/DL (ref 0.5–0.9)
EOSINOPHIL # BLD: 0.42 K/UL (ref 0–0.44)
EOSINOPHILS RELATIVE PERCENT: 8 % (ref 1–4)
ERYTHROCYTE [DISTWIDTH] IN BLOOD BY AUTOMATED COUNT: 18.3 % (ref 11.8–14.4)
GFR, ESTIMATED: 42 ML/MIN/1.73M2
GLUCOSE SERPL-MCNC: 97 MG/DL (ref 82–115)
HCT VFR BLD AUTO: 29.9 % (ref 36.3–47.1)
HGB BLD-MCNC: 8.9 G/DL (ref 11.9–15.1)
IMM GRANULOCYTES # BLD AUTO: 0.01 K/UL (ref 0–0.3)
IMM GRANULOCYTES NFR BLD: 0 %
LYMPHOCYTES NFR BLD: 1.49 K/UL (ref 1.1–3.7)
LYMPHOCYTES RELATIVE PERCENT: 27 % (ref 24–43)
MCH RBC QN AUTO: 27.7 PG (ref 25.2–33.5)
MCHC RBC AUTO-ENTMCNC: 29.8 G/DL (ref 28.4–34.8)
MCV RBC AUTO: 93.1 FL (ref 82.6–102.9)
MONOCYTES NFR BLD: 0.48 K/UL (ref 0.1–1.2)
MONOCYTES NFR BLD: 9 % (ref 3–12)
NEUTROPHILS NFR BLD: 55 % (ref 36–65)
NEUTS SEG NFR BLD: 3.09 K/UL (ref 1.5–8.1)
NRBC BLD-RTO: 0 PER 100 WBC
PLATELET # BLD AUTO: 259 K/UL (ref 138–453)
PMV BLD AUTO: 10.3 FL (ref 8.1–13.5)
POTASSIUM SERPL-SCNC: 4.3 MMOL/L (ref 3.7–5.3)
RBC # BLD AUTO: 3.21 M/UL (ref 3.95–5.11)
RBC # BLD: ABNORMAL 10*6/UL
SODIUM SERPL-SCNC: 140 MMOL/L (ref 136–145)
SURGICAL PATHOLOGY REPORT: NORMAL
WBC OTHER # BLD: 5.6 K/UL (ref 3.5–11.3)

## 2025-03-26 PROCEDURE — 85025 COMPLETE CBC W/AUTO DIFF WBC: CPT

## 2025-03-26 PROCEDURE — 2500000003 HC RX 250 WO HCPCS: Performed by: NURSE PRACTITIONER

## 2025-03-26 PROCEDURE — 97116 GAIT TRAINING THERAPY: CPT

## 2025-03-26 PROCEDURE — 80048 BASIC METABOLIC PNL TOTAL CA: CPT

## 2025-03-26 PROCEDURE — 6370000000 HC RX 637 (ALT 250 FOR IP): Performed by: NURSE PRACTITIONER

## 2025-03-26 PROCEDURE — 6370000000 HC RX 637 (ALT 250 FOR IP): Performed by: RADIOLOGY

## 2025-03-26 PROCEDURE — 6360000002 HC RX W HCPCS: Performed by: NURSE PRACTITIONER

## 2025-03-26 PROCEDURE — 97530 THERAPEUTIC ACTIVITIES: CPT

## 2025-03-26 PROCEDURE — 97162 PT EVAL MOD COMPLEX 30 MIN: CPT

## 2025-03-26 PROCEDURE — 36415 COLL VENOUS BLD VENIPUNCTURE: CPT

## 2025-03-26 RX ORDER — SCOPOLAMINE 1 MG/3D
1 PATCH, EXTENDED RELEASE TRANSDERMAL
Qty: 10 PATCH | Refills: 0
Start: 2025-03-27

## 2025-03-26 RX ORDER — FUROSEMIDE 20 MG/1
20 TABLET ORAL DAILY
Qty: 60 TABLET | Refills: 0 | Status: SHIPPED | OUTPATIENT
Start: 2025-03-26

## 2025-03-26 RX ORDER — CARVEDILOL 6.25 MG/1
6.25 TABLET ORAL 2 TIMES DAILY WITH MEALS
Qty: 60 TABLET | Refills: 0 | Status: SHIPPED | OUTPATIENT
Start: 2025-03-26

## 2025-03-26 RX ADMIN — POLYETHYLENE GLYCOL 3350 17 G: 17 POWDER, FOR SOLUTION ORAL at 08:03

## 2025-03-26 RX ADMIN — SODIUM CHLORIDE, PRESERVATIVE FREE 10 ML: 5 INJECTION INTRAVENOUS at 07:55

## 2025-03-26 RX ADMIN — ACETAMINOPHEN 650 MG: 325 TABLET, FILM COATED ORAL at 06:13

## 2025-03-26 RX ADMIN — ENOXAPARIN SODIUM 40 MG: 100 INJECTION SUBCUTANEOUS at 09:31

## 2025-03-26 RX ADMIN — CARVEDILOL 6.25 MG: 6.25 TABLET, FILM COATED ORAL at 07:54

## 2025-03-26 RX ADMIN — FUROSEMIDE 20 MG: 10 INJECTION, SOLUTION INTRAMUSCULAR; INTRAVENOUS at 07:54

## 2025-03-26 ASSESSMENT — PAIN DESCRIPTION - LOCATION: LOCATION: KNEE

## 2025-03-26 ASSESSMENT — PAIN DESCRIPTION - DESCRIPTORS: DESCRIPTORS: ACHING

## 2025-03-26 ASSESSMENT — PAIN DESCRIPTION - ORIENTATION: ORIENTATION: RIGHT;LEFT

## 2025-03-26 ASSESSMENT — PAIN DESCRIPTION - PAIN TYPE: TYPE: CHRONIC PAIN

## 2025-03-26 ASSESSMENT — PAIN SCALES - GENERAL
PAINLEVEL_OUTOF10: 3
PAINLEVEL_OUTOF10: 0

## 2025-03-26 ASSESSMENT — PAIN - FUNCTIONAL ASSESSMENT: PAIN_FUNCTIONAL_ASSESSMENT: ACTIVITIES ARE NOT PREVENTED

## 2025-03-26 ASSESSMENT — PAIN DESCRIPTION - FREQUENCY: FREQUENCY: INTERMITTENT

## 2025-03-26 NOTE — PROGRESS NOTES
Patient discharged home with home health. RN went over medications to  from pharmacy and when the next dose is. Notified patient and daughter of follow up appts and the numbers to call to make appts. Rn answered all of patient and daughters questions. Education and paperwork given. Patient left with all belongings via wheelchair to front of hospital.

## 2025-03-26 NOTE — PROGRESS NOTES
RN messaged attending about discharge; She is ready for discharge. Nephro said continue low dose of diuretics. which is 20mg  daily so that would need to be sent to her pharmacy along with the other ones    Orders being placed per Carlos.

## 2025-03-26 NOTE — PROGRESS NOTES
End Of Shift Note  [facility]  Summary of shift: Pt night went well. Remained off O2. Up to Bathroom with walker and standby assist. Vital signs remain within normal limits. Will follow with Cardiology on outpt basis. Up walking with walker and standby assist around unit multiple times a day, tolerates well    Vitals:    Vitals:    03/25/25 1737 03/25/25 1945 03/25/25 2354 03/26/25 0400   BP: 119/82 (!) 143/77 (!) 140/71 135/62   Pulse: 74 71 71 63   Resp:  18     Temp:  97.4 °F (36.3 °C) 97.6 °F (36.4 °C) 97.8 °F (36.6 °C)   TempSrc:  Oral Oral Oral   SpO2:  94% 93% 95%   Weight:    75.8 kg (167 lb 1.7 oz)   Height:            I&O:   Intake/Output Summary (Last 24 hours) at 3/26/2025 0542  Last data filed at 3/26/2025 0349  Gross per 24 hour   Intake 265 ml   Output 1200 ml   Net -935 ml       Resp Status: Room air    Ventilator Settings:     / / /FiO2 : 40 %    Critical Care IV infusions:   sodium chloride          LDA:   Peripheral IV 03/23/25 Left Antecubital (Active)   Number of days: 2       Peripheral IV 03/24/25 Distal;Left;Posterior Forearm (Active)   Number of days: 2       Incision 02/26/25 Abdomen Medial;Upper (Active)   Number of days: 28       Incision 02/26/25 Abdomen Lower;Medial (Active)   Number of days: 28

## 2025-03-26 NOTE — DISCHARGE SUMMARY
Platelets 305 138 - 453 k/uL    MPV 10.1 8.1 - 13.5 fL    NRBC Automated 0.0 0.0 per 100 WBC    Neutrophils % 56 36 - 65 %    Lymphocytes % 29 24 - 43 %    Monocytes % 8 3 - 12 %    Eosinophils % 6 (H) 1 - 4 %    Basophils % 1 0 - 2 %    Immature Granulocytes % 0 0 %    Neutrophils Absolute 3.11 1.50 - 8.10 k/uL    Lymphocytes Absolute 1.59 1.10 - 3.70 k/uL    Monocytes Absolute 0.46 0.10 - 1.20 k/uL    Eosinophils Absolute 0.34 0.00 - 0.44 k/uL    Basophils Absolute 0.06 0.00 - 0.20 k/uL    Immature Granulocytes Absolute 0.02 0.00 - 0.30 k/uL    RBC Morphology ANISOCYTOSIS PRESENT    POC Glucose Fingerstick    Collection Time: 03/25/25  7:44 PM   Result Value Ref Range    POC Glucose 102 65 - 105 mg/dL   Basic Metabolic Panel w/ Reflex to MG    Collection Time: 03/26/25  4:25 AM   Result Value Ref Range    Sodium 140 136 - 145 mmol/L    Potassium 4.3 3.7 - 5.3 mmol/L    Chloride 107 98 - 107 mmol/L    CO2 22 20 - 31 mmol/L    Anion Gap 10 9 - 16 mmol/L    Glucose 97 82 - 115 mg/dL    BUN 17 8 - 23 mg/dL    Creatinine 1.3 (H) 0.50 - 0.90 mg/dL    Est, Glom Filt Rate 42 (L) >60 mL/min/1.73m2    Calcium 9.4 8.8 - 10.2 mg/dL   CBC with Auto Differential    Collection Time: 03/26/25  4:25 AM   Result Value Ref Range    WBC 5.6 3.5 - 11.3 k/uL    RBC 3.21 (L) 3.95 - 5.11 m/uL    Hemoglobin 8.9 (L) 11.9 - 15.1 g/dL    Hematocrit 29.9 (L) 36.3 - 47.1 %    MCV 93.1 82.6 - 102.9 fL    MCH 27.7 25.2 - 33.5 pg    MCHC 29.8 28.4 - 34.8 g/dL    RDW 18.3 (H) 11.8 - 14.4 %    Platelets 259 138 - 453 k/uL    MPV 10.3 8.1 - 13.5 fL    NRBC Automated 0.0 0.0 per 100 WBC    Neutrophils % 55 36 - 65 %    Lymphocytes % 27 24 - 43 %    Monocytes % 9 3 - 12 %    Eosinophils % 8 (H) 1 - 4 %    Basophils % 1 0 - 2 %    Immature Granulocytes % 0 0 %    Neutrophils Absolute 3.09 1.50 - 8.10 k/uL    Lymphocytes Absolute 1.49 1.10 - 3.70 k/uL    Monocytes Absolute 0.48 0.10 - 1.20 k/uL    Eosinophils Absolute 0.42 0.00 - 0.44 k/uL    Basophils

## 2025-03-26 NOTE — DISCHARGE INSTRUCTIONS
Patient needs follow-up with urology, nephrology, cardiology.  She needs outpatient ischemic testing.  Close follow-up with PCP.  Repeat labs next week Monday with results to primary care physician and nephrologist.

## 2025-03-26 NOTE — PLAN OF CARE
Problem: Discharge Planning  Goal: Discharge to home or other facility with appropriate resources  3/25/2025 0840 by Anabell Elias RN  Outcome: Progressing  3/24/2025 2115 by Argelia Harrison RN  Outcome: Progressing  Flowsheets (Taken 3/24/2025 2000)  Discharge to home or other facility with appropriate resources:   Identify barriers to discharge with patient and caregiver   Arrange for needed discharge resources and transportation as appropriate   Refer to discharge planning if patient needs post-hospital services based on physician order or complex needs related to functional status, cognitive ability or social support system     Problem: Safety - Adult  Goal: Free from fall injury  3/25/2025 0840 by Anabell Elias RN  Outcome: Progressing  3/24/2025 2115 by Argelia Harrison RN  Outcome: Progressing  Flowsheets (Taken 3/24/2025 2000)  Free From Fall Injury:   Instruct family/caregiver on patient safety   Based on caregiver fall risk screen, instruct family/caregiver to ask for assistance with transferring infant if caregiver noted to have fall risk factors     Problem: Skin/Tissue Integrity  Goal: Skin integrity remains intact  Description: 1.  Monitor for areas of redness and/or skin breakdown  2.  Assess vascular access sites hourly  3.  Every 4-6 hours minimum:  Change oxygen saturation probe site  4.  Every 4-6 hours:  If on nasal continuous positive airway pressure, respiratory therapy assess nares and determine need for appliance change or resting period  Recent Flowsheet Documentation  Taken 3/25/2025 0730 by Marlee Amanda  Skin Integrity Remains Intact:   Monitor for areas of redness and/or skin breakdown   Assess vascular access sites hourly  3/24/2025 2115 by Argelia Harrison, RN  Outcome: Progressing  Flowsheets (Taken 3/24/2025 2000)  Skin Integrity Remains Intact:   Monitor for areas of redness and/or skin breakdown   Assess vascular access sites hourly     Problem: 
  Problem: Discharge Planning  Goal: Discharge to home or other facility with appropriate resources  3/26/2025 0946 by Chana Quiroz RN  Outcome: Progressing  3/26/2025 0014 by Argelia Harrison RN  Outcome: Progressing  Flowsheets (Taken 3/25/2025 2000)  Discharge to home or other facility with appropriate resources:   Identify barriers to discharge with patient and caregiver   Arrange for needed discharge resources and transportation as appropriate   Refer to discharge planning if patient needs post-hospital services based on physician order or complex needs related to functional status, cognitive ability or social support system     Problem: Safety - Adult  Goal: Free from fall injury  3/26/2025 0946 by Chana Quiroz RN  Outcome: Progressing  3/26/2025 0014 by Argelia Harrison RN  Outcome: Progressing  Flowsheets (Taken 3/25/2025 2000)  Free From Fall Injury:   Instruct family/caregiver on patient safety   Based on caregiver fall risk screen, instruct family/caregiver to ask for assistance with transferring infant if caregiver noted to have fall risk factors     Problem: Skin/Tissue Integrity  Goal: Skin integrity remains intact  Description: 1.  Monitor for areas of redness and/or skin breakdown  2.  Assess vascular access sites hourly  3.  Every 4-6 hours minimum:  Change oxygen saturation probe site  4.  Every 4-6 hours:  If on nasal continuous positive airway pressure, respiratory therapy assess nares and determine need for appliance change or resting period  3/26/2025 0946 by Chana Quiroz RN  Outcome: Progressing  3/26/2025 0014 by Argelia Harrison RN  Outcome: Progressing  Flowsheets (Taken 3/25/2025 2000)  Skin Integrity Remains Intact:   Monitor for areas of redness and/or skin breakdown   Assess vascular access sites hourly     Problem: Cardiovascular - Adult  Goal: Absence of cardiac dysrhythmias or at baseline  3/26/2025 0946 by Chana Quiroz RN  Outcome: Progressing  3/26/2025 
  Problem: Discharge Planning  Goal: Discharge to home or other facility with appropriate resources  Outcome: Progressing     Problem: Safety - Adult  Goal: Free from fall injury  Outcome: Progressing     Problem: Cardiovascular - Adult  Goal: Absence of cardiac dysrhythmias or at baseline  Outcome: Progressing     Problem: Respiratory - Adult  Goal: Achieves optimal ventilation and oxygenation  Outcome: Progressing     
  Problem: Discharge Planning  Goal: Discharge to home or other facility with appropriate resources  Outcome: Progressing  Flowsheets (Taken 3/25/2025 2000)  Discharge to home or other facility with appropriate resources:   Identify barriers to discharge with patient and caregiver   Arrange for needed discharge resources and transportation as appropriate   Refer to discharge planning if patient needs post-hospital services based on physician order or complex needs related to functional status, cognitive ability or social support system     Problem: Safety - Adult  Goal: Free from fall injury  Outcome: Progressing  Flowsheets (Taken 3/25/2025 2000)  Free From Fall Injury:   Instruct family/caregiver on patient safety   Based on caregiver fall risk screen, instruct family/caregiver to ask for assistance with transferring infant if caregiver noted to have fall risk factors     Problem: Skin/Tissue Integrity  Goal: Skin integrity remains intact  Description: 1.  Monitor for areas of redness and/or skin breakdown  2.  Assess vascular access sites hourly  3.  Every 4-6 hours minimum:  Change oxygen saturation probe site  4.  Every 4-6 hours:  If on nasal continuous positive airway pressure, respiratory therapy assess nares and determine need for appliance change or resting period  Outcome: Progressing  Flowsheets (Taken 3/25/2025 2000)  Skin Integrity Remains Intact:   Monitor for areas of redness and/or skin breakdown   Assess vascular access sites hourly     Problem: Cardiovascular - Adult  Goal: Absence of cardiac dysrhythmias or at baseline  Outcome: Progressing  Flowsheets (Taken 3/25/2025 2000)  Absence of cardiac dysrhythmias or at baseline:   Monitor cardiac rate and rhythm   Assess for signs of decreased cardiac output     Problem: Respiratory - Adult  Goal: Achieves optimal ventilation and oxygenation  Outcome: Progressing     Problem: Pain  Goal: Verbalizes/displays adequate comfort level or baseline comfort 
  Problem: Respiratory - Adult  Goal: Achieves optimal ventilation and oxygenation  3/24/2025 1907 by Renae Sheffield RCP  Outcome: Progressing     
optimal ventilation and oxygenation  3/24/2025 2115 by Argelia Harrison, RN  Outcome: Progressing  Flowsheets (Taken 3/24/2025 2000)  Achieves optimal ventilation and oxygenation:   Assess for changes in respiratory status   Assess for changes in mentation and behavior   Respiratory therapy support as indicated  3/24/2025 1907 by Renae Sheffield RCP  Outcome: Progressing  3/24/2025 0944 by Anabell Elias, RN  Outcome: Progressing     
period  3/26/2025 1243 by Chana Quiroz RN  Outcome: Adequate for Discharge  Flowsheets (Taken 3/26/2025 0947)  Skin Integrity Remains Intact:   Monitor for areas of redness and/or skin breakdown   Assess vascular access sites hourly   Every 4-6 hours minimum: Change oxygen saturation probe site   Every 4-6 hours: If on nasal continuous positive airway pressure, respiratory therapy assesses nares and determine need for appliance change or resting period  3/26/2025 0946 by Chana Quiroz RN  Outcome: Progressing  Flowsheets (Taken 3/26/2025 0900)  Skin Integrity Remains Intact:   Monitor for areas of redness and/or skin breakdown   Assess vascular access sites hourly   Every 4-6 hours minimum: Change oxygen saturation probe site   Every 4-6 hours: If on nasal continuous positive airway pressure, respiratory therapy assesses nares and determine need for appliance change or resting period  3/26/2025 0014 by Argelia Harrison RN  Outcome: Progressing  Flowsheets (Taken 3/25/2025 2000)  Skin Integrity Remains Intact:   Monitor for areas of redness and/or skin breakdown   Assess vascular access sites hourly     Problem: Cardiovascular - Adult  Goal: Absence of cardiac dysrhythmias or at baseline  3/26/2025 1243 by Chana Quiroz RN  Outcome: Adequate for Discharge  3/26/2025 0946 by Chana Quiroz RN  Outcome: Progressing  3/26/2025 0014 by Argelia Harrison RN  Outcome: Progressing  Flowsheets (Taken 3/25/2025 2000)  Absence of cardiac dysrhythmias or at baseline:   Monitor cardiac rate and rhythm   Assess for signs of decreased cardiac output     Problem: Respiratory - Adult  Goal: Achieves optimal ventilation and oxygenation  3/26/2025 1243 by Chana Quiroz RN  Outcome: Adequate for Discharge  3/26/2025 0946 by Chana Quiroz RN  Outcome: Progressing  Flowsheets (Taken 3/26/2025 0900)  Achieves optimal ventilation and oxygenation:   Assess for changes in respiratory status   Assess for changes in

## 2025-03-26 NOTE — DISCHARGE INSTR - DIET

## 2025-03-26 NOTE — PROGRESS NOTES
Physical Therapy  Facility/Department: [facility]   Physical Therapy Initial Evaluation    Patient Name: Ness Castañeda        MRN: [id number]    : 1950    Date of Service: 3/26/2025    Chief Complaint   Patient presents with    Respiratory Distress     Past Medical History:  has a past medical history of Arthritis, Bladder tumor, Blood in urine, Cancer (HCC), Cancer (HCC), Dental crowns present, Diabetes mellitus (HCC), Diverticulitis, Hypertension, Irregular heart beat, Kidney disease, Prediabetes, Skin cancer, and Wears glasses.  Past Surgical History:  has a past surgical history that includes bladder tumor excision (2017); Colonoscopy (); Dilation and curettage of uterus (); Tonsillectomy (); partial nephrectomy (Right, 2017); partial nephrectomy (Right, 2017); Esophagogastroduodenoscopy; skin biopsy; Skin cancer excision; and Sigmoid Colectomy (N/A, 2025).    Discharge Recommendations  Discharge Recommendations: Therapy recommended at discharge  PT Equipment Recommendations  Equipment Needed: No  Other: Pt owns walker    Assessment:     Pt presents with decline in general strength and activity tolerance, easily fatigued requiring use of RW and rest breaks with ambulating. Pt will continue to benefit from PT to progress activity and promote fucntional independence with mobility    Therapy Prognosis: Good  Decision Making: Medium Complexity  Requires PT Follow-Up: Yes  Activity Tolerance  Activity Tolerance: Patient tolerated evaluation without incident, Patient limited by endurance, Patient limited by fatigue  Activity Tolerance Comments: Pt was able to participate in PT POC and in agreement with goals  Safety Devices  Type of Devices: Call light within reach, Gait belt, Left in chair, Nurse notified  Restraints  Restraints Initially in Place: No    AM-PAC  AM-PAC Basic Mobility - Inpatient   How much help is needed turning from your back to your side while in a flat bed

## 2025-03-26 NOTE — PROGRESS NOTES
Reason for Follow up: Acute kidney injury  Pleural effusion  Status post thoracocentesis  History of right nephrectomy  History of bladder cancer    HISTORY:    Patient is feeling better.  She is status post thoracocentesis 1500 cc were removed dyspnea has improved she is on room air now urine output remains good 1800 mL in 24 hours creatinine 1.3 which is stable for 1 kidney she is status post cystoscopy in the recent past and fulguration of the recurrence of tumor of the bladder    Review Of Systems:   Constitutional: No fever, chills, lethargy, weakness or wt loss.  Cardiac:  No chest pain, dyspnea, orthopnea or PND.  Pulmonary:  No cough, phlegm or wheezing.  Abdomen:  No abdominal pain, nausea, vomiting or diarrhea.  :   No hematuria, pyuria, dysuria or flank pain.  Extremities:  No swelling or joint pains.      Review Of Systems:   Constitutional: No fever, chills, lethargy, weakness or wt loss.  HEENT:  No headache, nasal discharge or sore throat.  Cardiac:  No chest pain, dyspnea, orthopnea or PND.  Pulmonary:  No cough, phlegm or wheezing.  Abdomen:  No abdominal pain, nausea, vomiting or diarrhea.  Neuro:  No gross focal weakness, numbness, abnormal movements or seizure like activity.  Skin:   No rashes or itching.  :   No hematuria, pyuria, dysuria or flank pain.  Extremities:  No swelling or joint pains.  Endocrine: No polyuria, polydypsia, or thyroid problems.  Hematology:    No bleeding disorders, bruising or anemia.  All other ROS is negative.  Unable to obtain because he is intubated and sedated.     Scheduled Meds:   scopolamine  1 patch TransDERmal Q72H    carvedilol  6.25 mg Oral BID WC    sodium chloride flush  5-40 mL IntraVENous 2 times per day    enoxaparin  40 mg SubCUTAneous Daily    furosemide  20 mg IntraVENous Daily   Continuous Infusions:   sodium chloride       PRN Meds:acetaminophen, sodium chloride flush, sodium chloride flush, sodium chloride, potassium chloride **OR** potassium

## 2025-03-26 NOTE — PROGRESS NOTES
Pulmonary Critical Care Progress Note    Patient seen for the follow up of Pulmonary edema with congestive heart failure (HCC)     Subjective:    Patient has been weaned off oxygen..  She has improved shortness of breath ambulating to the bathroom.  Denies chest pain.  No dizziness.  Occasional dry cough.  Had thoracentesis by IR 1.2 L fluid removed yesterday.  Examination:    Vitals: BP (!) 130/58   Pulse 69   Temp 98 °F (36.7 °C) (Temporal)   Resp 18   Ht 1.626 m (5' 4\")   Wt 74.6 kg (164 lb 7.4 oz)   LMP 1998   SpO2 94%   BMI 28.23 kg/m²   SpO2  Av %  Min: 93 %  Max: 97 %  General appearance: alert and cooperative with exam  Neck: No JVD  Lungs: Decreased breath sound no crackles or wheezing  Heart: regular rate and rhythm, S1, S2 normal, no gallop  Abdomen: Soft, non tender, + BS  Extremities: no cyanosis or clubbing. No significant edema    LABs:    CBC:   Recent Labs     25  0555 25  0425   WBC 5.6 5.6   HGB 9.3* 8.9*   HCT 31.1* 29.9*    259     BMP:   Recent Labs     25  0555 25  0425    140   K 4.6 4.3   CO2 22 22   BUN 16 17   CREATININE 1.3* 1.3*   LABGLOM 44* 42*   GLUCOSE 94 97     PT/INR:   Recent Labs     25  2159 25  0630   PROTIME 14.9* 15.1*   INR 1.1 1.2     APTT:  Recent Labs     25   APTT 24.0       Radiology:    Chest x-ray 3/25  Stable exam demonstrating small-moderate left pleural effusion associated  with left basilar atelectasis     Mild pulmonary edema persist.      Echocardiogram 324   Left Ventricle: Moderately reduced left ventricular systolic function. EF by visual approximation is 35%. Left ventricle size is normal. Mildly increased wall thickness. See diagram for wall motion findings. Abnormal diastolic function.    Aortic Valve: Trace regurgitation.    Mitral Valve: Mild annular calcification. Mildly thickened leaflets. Moderate to severe regurgitation. Consider LIA if indicated.    Tricuspid Valve: Mild

## 2025-03-28 NOTE — PROGRESS NOTES
Physician Progress Note      PATIENT:               MATILDA ARMENTA  [facility] #:                  [id number]  :                       1950  ADMIT DATE:       3/23/2025 9:40 PM  DISCH DATE:        3/26/2025 1:58 PM  RESPONDING  PROVIDER #:        Charisse Gonzalez MD          QUERY TEXT:    Patient admitted with CHF exacerbation.  Noted documentation of Acute Kidney   Injury in 3/26 nephrology note.  In order to support the diagnosis of PILO,   please include additional clinical indicators in your documentation.? Or   please document if the diagnosis of PILO has been ruled out after further   study.  The medical record reflects the following:  Risk Factors: 74 y.o. female who presents with cough, dyspnea, hypoxemia   patient is known to have chronic kidney disease stage IIIb previous history of   right nephrectomy for renal cell carcinoma as well as history of bladder   cancer treated with BCG and fulguration  Clinical Indicators: Creatinine: 1.3, 1.2, 1.3, 1.3- No documented baseline  -3/13 Creatinine: 1.2-1.6  Treatment: Serial labs    Defined by Kidney Disease Improving Global Outcomes (KDIGO) clinical practice   guideline for acute kidney injury:  -Increase in SCr by greater than or equal to 0.3 mg/dl within 48 hours; or  -Increase or decrease in SCr to greater than or equal to 1.5 times baseline,   which is known or presumed to have occurred within the prior 7 days; or  -Urine volume < 0.5ml/kg/h for 6 hours.  Options provided:  -- Acute kidney injury evidenced by, Please document evidence as well as a   numerical baseline creatinine, if known.  -- Currently resolved acute kidney injury was evidenced by, Please document   evidence as well as a numerical baseline creatinine, if known.  -- Acute kidney injury ruled out after study  -- Other - I will add my own diagnosis  -- Disagree - Not applicable / Not valid  -- Disagree - Clinically unable to determine / Unknown  -- Refer to Clinical Documentation

## 2025-03-29 LAB
MICROORGANISM SPEC CULT: NORMAL
MICROORGANISM SPEC CULT: NORMAL
SERVICE CMNT-IMP: NORMAL
SERVICE CMNT-IMP: NORMAL
SPECIMEN DESCRIPTION: NORMAL
SPECIMEN DESCRIPTION: NORMAL

## 2025-03-30 LAB
MICROORGANISM SPEC CULT: NORMAL
MICROORGANISM/AGENT SPEC: NORMAL
SERVICE CMNT-IMP: NORMAL
SPECIMEN DESCRIPTION: NORMAL

## 2025-03-31 ENCOUNTER — HOSPITAL ENCOUNTER (OUTPATIENT)
Age: 75
Setting detail: SPECIMEN
Discharge: HOME | End: 2025-03-31

## 2025-03-31 DIAGNOSIS — I50.9 ACUTE CONGESTIVE HEART FAILURE, UNSPECIFIED HEART FAILURE TYPE (HCC): ICD-10-CM

## 2025-03-31 LAB
25(OH)D3 SERPL-MCNC: 34.7 NG/ML (ref 30–100)
ANION GAP SERPL CALCULATED.3IONS-SCNC: 10 MMOL/L (ref 9–16)
BASOPHILS # BLD: 0.05 K/UL (ref 0–0.2)
BASOPHILS NFR BLD: 1 % (ref 0–2)
BUN SERPL-MCNC: 17 MG/DL (ref 8–23)
CALCIUM SERPL-MCNC: 10.7 MG/DL (ref 8.6–10.4)
CHLORIDE SERPL-SCNC: 105 MMOL/L (ref 98–107)
CO2 SERPL-SCNC: 23 MMOL/L (ref 20–31)
CREAT SERPL-MCNC: 1.4 MG/DL (ref 0.6–0.9)
EOSINOPHIL # BLD: 0.31 K/UL (ref 0–0.44)
EOSINOPHILS RELATIVE PERCENT: 5 % (ref 1–4)
ERYTHROCYTE [DISTWIDTH] IN BLOOD BY AUTOMATED COUNT: 17.5 % (ref 11.8–14.4)
FERRITIN SERPL-MCNC: 66 NG/ML
GFR, ESTIMATED: 39 ML/MIN/1.73M2
GLUCOSE SERPL-MCNC: 100 MG/DL (ref 74–99)
HCT VFR BLD AUTO: 37.5 % (ref 36.3–47.1)
HGB BLD-MCNC: 10.8 G/DL (ref 11.9–15.1)
IMM GRANULOCYTES # BLD AUTO: <0.03 K/UL (ref 0–0.3)
IMM GRANULOCYTES NFR BLD: 0 %
IRON SATN MFR SERPL: 13 % (ref 20–55)
IRON SERPL-MCNC: 34 UG/DL (ref 37–145)
LYMPHOCYTES NFR BLD: 1.24 K/UL (ref 1.1–3.7)
LYMPHOCYTES RELATIVE PERCENT: 19 % (ref 24–43)
MCH RBC QN AUTO: 26.5 PG (ref 25.2–33.5)
MCHC RBC AUTO-ENTMCNC: 28.8 G/DL (ref 28.4–34.8)
MCV RBC AUTO: 91.9 FL (ref 82.6–102.9)
MICROORGANISM SPEC CULT: NORMAL
MICROORGANISM/AGENT SPEC: NORMAL
MONOCYTES NFR BLD: 0.59 K/UL (ref 0.1–1.2)
MONOCYTES NFR BLD: 9 % (ref 3–12)
NEUTROPHILS NFR BLD: 66 % (ref 36–65)
NEUTS SEG NFR BLD: 4.17 K/UL (ref 1.5–8.1)
NRBC BLD-RTO: 0 PER 100 WBC
PLATELET # BLD AUTO: 336 K/UL (ref 138–453)
PMV BLD AUTO: 11 FL (ref 8.1–13.5)
POTASSIUM SERPL-SCNC: 4.9 MMOL/L (ref 3.7–5.3)
RBC # BLD AUTO: 4.08 M/UL (ref 3.95–5.11)
RBC # BLD: ABNORMAL 10*6/UL
SERVICE CMNT-IMP: NORMAL
SODIUM SERPL-SCNC: 138 MMOL/L (ref 136–145)
SPECIMEN DESCRIPTION: NORMAL
TIBC SERPL-MCNC: 258 UG/DL (ref 250–450)
UNSATURATED IRON BINDING CAPACITY: 224 UG/DL (ref 112–347)
WBC OTHER # BLD: 6.4 K/UL (ref 3.5–11.3)

## 2025-04-14 NOTE — PATIENT INSTRUCTIONS
CORTISONE INJECTION CARE    The injection site should never get red, hot, or swollen and if it does the patient will contact our office right away. The patient may experience a increase in soreness the first 24-48 hours due to a cortisone flair and can take anti-inflammatories for a short period of time to reduce that soreness. The patient should not submerge the injection site in water for a minimum of 24 hours to avoid infection. This means no lakes, pools, ponds, or hot tubs for 24 hours. If the patient is diabetic the injection may increase their blood sugar for up to one week. The patient can do this cortisone injection once every 4 months as needed.                                                                                                                                                                                                                                                                                                                     PATIENTIQ:  PatientIQ helps [facility] stay in touch with you to know how you're feeling, and provides education and care instructions to you at various time points.   Your answers help your care team track your progress to provide the best care possible. PatientIQ will contact you pre-op and post-op via email or text with:  Educational Videos and Care Instructions  Questionnaires About How You're Feeling    Your participation provides you valuable education and helps [facility] continue to provide quality care to all patients. Thank you

## 2025-04-15 ENCOUNTER — OFFICE VISIT (OUTPATIENT)
Dept: ORTHOPEDIC SURGERY | Age: 75
End: 2025-04-15
Payer: MEDICARE

## 2025-04-15 VITALS — WEIGHT: 152 LBS | HEIGHT: 64 IN | BODY MASS INDEX: 25.95 KG/M2 | OXYGEN SATURATION: 98 % | RESPIRATION RATE: 16 BRPM

## 2025-04-15 DIAGNOSIS — M17.0 BILATERAL PRIMARY OSTEOARTHRITIS OF KNEE: Primary | ICD-10-CM

## 2025-04-15 PROCEDURE — 20611 DRAIN/INJ JOINT/BURSA W/US: CPT | Performed by: PHYSICIAN ASSISTANT

## 2025-04-15 RX ORDER — METHYLPREDNISOLONE ACETATE 80 MG/ML
80 INJECTION, SUSPENSION INTRA-ARTICULAR; INTRALESIONAL; INTRAMUSCULAR; SOFT TISSUE ONCE
Status: COMPLETED | OUTPATIENT
Start: 2025-04-15 | End: 2025-04-15

## 2025-04-15 RX ORDER — DAPAGLIFLOZIN 10 MG/1
10 TABLET, FILM COATED ORAL EVERY MORNING
COMMUNITY

## 2025-04-15 RX ORDER — LIDOCAINE HYDROCHLORIDE 10 MG/ML
4 INJECTION, SOLUTION INFILTRATION; PERINEURAL ONCE
Status: COMPLETED | OUTPATIENT
Start: 2025-04-15 | End: 2025-04-15

## 2025-04-15 RX ADMIN — METHYLPREDNISOLONE ACETATE 80 MG: 80 INJECTION, SUSPENSION INTRA-ARTICULAR; INTRALESIONAL; INTRAMUSCULAR; SOFT TISSUE at 15:02

## 2025-04-15 RX ADMIN — LIDOCAINE HYDROCHLORIDE 4 ML: 10 INJECTION, SOLUTION INFILTRATION; PERINEURAL at 15:01

## 2025-04-15 RX ADMIN — METHYLPREDNISOLONE ACETATE 80 MG: 80 INJECTION, SUSPENSION INTRA-ARTICULAR; INTRALESIONAL; INTRAMUSCULAR; SOFT TISSUE at 15:03

## 2025-04-15 NOTE — PROGRESS NOTES
[facility] PHYSICIANS [facility] ORTHOPEDICS AND SPORTS MEDICINE  [address]  [place] [address]  Dept: [phone]  Dept Fax: [phone]          Bilateral knee Visit - Follow up    Subjective:     Chief Complaint   Patient presents with    Knee Pain     B Knee Pain- inj req LI 12/3/24     HPI:     Ness Castañeda presents today for Bilateral knee pain.  Patient is here today for cortisone injections into Bilateral knee.  She had her last cortisone injection was on 12/3/2024.  She has not had a lubrication injection.    I have reviewed the CC, and if not present in this note, I have reviewed in the patient's chart.   I agree with the documentation provided by other staff and have reviewed their documentation prior to providing my signature indicating agreement.  Vitals:   Resp 16   Ht 1.626 m (5' 4\")   Wt 68.9 kg (152 lb)   LMP 02/24/1998   SpO2 98%   BMI 26.09 kg/m²  Body mass index is 26.09 kg/m².    Assessment:     1. Bilateral primary osteoarthritis of knee          Procedure:   Procedure: Yes    Regular Knee Injection    Location: Bilateral Knee  Procedure: I discussed in detail the risks, benefits and complications of the corticosteroid injection which included but are not limited to: infection, skin reactions, hot swollen, and anaphylaxis with the patient. Ness Castañeda verbalized understanding and they have agreed to have the corticosteroid injection into the bilateral knee. The patient was placed in the Supine position on the exam table. The superior lateral portal was identified and marked with a ball point pen. The skin was prepped with betadine in a sterile fashion. Utilizing a [employer] ultrasound unit with a variable frequency linear transducer and clean technique with sterile gloves, a 3 cc solution containing 2 cc of 1% lidocaine   with 1 cc containing 80 mg of Depo-Medrol was injected. There was no resistance to the injection. The

## 2025-04-24 ENCOUNTER — HOSPITAL ENCOUNTER (OUTPATIENT)
Age: 75
Setting detail: SPECIMEN
Discharge: HOME | End: 2025-04-24

## 2025-04-24 LAB
BASOPHILS # BLD: 0.06 K/UL (ref 0–0.2)
BASOPHILS NFR BLD: 1 % (ref 0–2)
EOSINOPHIL # BLD: 0.27 K/UL (ref 0–0.44)
EOSINOPHILS RELATIVE PERCENT: 4 % (ref 1–4)
ERYTHROCYTE [DISTWIDTH] IN BLOOD BY AUTOMATED COUNT: 15.9 % (ref 11.8–14.4)
HCT VFR BLD AUTO: 40.2 % (ref 36.3–47.1)
HGB BLD-MCNC: 11.7 G/DL (ref 11.9–15.1)
IMM GRANULOCYTES # BLD AUTO: <0.03 K/UL (ref 0–0.3)
IMM GRANULOCYTES NFR BLD: 0 %
LYMPHOCYTES NFR BLD: 1.62 K/UL (ref 1.1–3.7)
LYMPHOCYTES RELATIVE PERCENT: 21 % (ref 24–43)
MCH RBC QN AUTO: 26.8 PG (ref 25.2–33.5)
MCHC RBC AUTO-ENTMCNC: 29.1 G/DL (ref 28.4–34.8)
MCV RBC AUTO: 92.2 FL (ref 82.6–102.9)
MONOCYTES NFR BLD: 0.5 K/UL (ref 0.1–1.2)
MONOCYTES NFR BLD: 7 % (ref 3–12)
NEUTROPHILS NFR BLD: 67 % (ref 36–65)
NEUTS SEG NFR BLD: 5.19 K/UL (ref 1.5–8.1)
NRBC BLD-RTO: 0 PER 100 WBC
PLATELET # BLD AUTO: 267 K/UL (ref 138–453)
PMV BLD AUTO: 10.8 FL (ref 8.1–13.5)
PTH-INTACT SERPL-MCNC: 196 PG/ML (ref 17.9–58.6)
RBC # BLD AUTO: 4.36 M/UL (ref 3.95–5.11)
RBC # BLD: ABNORMAL 10*6/UL
WBC OTHER # BLD: 7.7 K/UL (ref 3.5–11.3)

## 2025-04-27 ENCOUNTER — HEALTH MAINTENANCE LETTER (OUTPATIENT)
Age: 75
End: 2025-04-27

## 2025-06-17 ENCOUNTER — HOSPITAL ENCOUNTER (OUTPATIENT)
Age: 75
Setting detail: OUTPATIENT SURGERY
Discharge: HOME | End: 2025-06-17
Attending: STUDENT IN AN ORGANIZED HEALTH CARE EDUCATION/TRAINING PROGRAM | Admitting: STUDENT IN AN ORGANIZED HEALTH CARE EDUCATION/TRAINING PROGRAM
Payer: MEDICARE

## 2025-06-17 VITALS
DIASTOLIC BLOOD PRESSURE: 48 MMHG | TEMPERATURE: 97.5 F | HEART RATE: 58 BPM | OXYGEN SATURATION: 100 % | RESPIRATION RATE: 16 BRPM | SYSTOLIC BLOOD PRESSURE: 143 MMHG

## 2025-06-17 DIAGNOSIS — I42.9 CARDIOMYOPATHY, UNSPECIFIED TYPE (HCC): ICD-10-CM

## 2025-06-17 DIAGNOSIS — I20.9 ANGINA PECTORIS: ICD-10-CM

## 2025-06-17 PROCEDURE — 99152 MOD SED SAME PHYS/QHP 5/>YRS: CPT | Performed by: STUDENT IN AN ORGANIZED HEALTH CARE EDUCATION/TRAINING PROGRAM

## 2025-06-17 PROCEDURE — C1894 INTRO/SHEATH, NON-LASER: HCPCS | Performed by: STUDENT IN AN ORGANIZED HEALTH CARE EDUCATION/TRAINING PROGRAM

## 2025-06-17 PROCEDURE — 93454 CORONARY ARTERY ANGIO S&I: CPT | Performed by: STUDENT IN AN ORGANIZED HEALTH CARE EDUCATION/TRAINING PROGRAM

## 2025-06-17 PROCEDURE — 6360000002 HC RX W HCPCS: Performed by: STUDENT IN AN ORGANIZED HEALTH CARE EDUCATION/TRAINING PROGRAM

## 2025-06-17 PROCEDURE — 7100000010 HC PHASE II RECOVERY - FIRST 15 MIN: Performed by: STUDENT IN AN ORGANIZED HEALTH CARE EDUCATION/TRAINING PROGRAM

## 2025-06-17 PROCEDURE — 2580000003 HC RX 258: Performed by: STUDENT IN AN ORGANIZED HEALTH CARE EDUCATION/TRAINING PROGRAM

## 2025-06-17 PROCEDURE — 76937 US GUIDE VASCULAR ACCESS: CPT | Performed by: STUDENT IN AN ORGANIZED HEALTH CARE EDUCATION/TRAINING PROGRAM

## 2025-06-17 PROCEDURE — 6360000004 HC RX CONTRAST MEDICATION: Performed by: STUDENT IN AN ORGANIZED HEALTH CARE EDUCATION/TRAINING PROGRAM

## 2025-06-17 PROCEDURE — 2500000003 HC RX 250 WO HCPCS: Performed by: STUDENT IN AN ORGANIZED HEALTH CARE EDUCATION/TRAINING PROGRAM

## 2025-06-17 PROCEDURE — 6370000000 HC RX 637 (ALT 250 FOR IP): Performed by: STUDENT IN AN ORGANIZED HEALTH CARE EDUCATION/TRAINING PROGRAM

## 2025-06-17 PROCEDURE — 7100000011 HC PHASE II RECOVERY - ADDTL 15 MIN: Performed by: STUDENT IN AN ORGANIZED HEALTH CARE EDUCATION/TRAINING PROGRAM

## 2025-06-17 PROCEDURE — C1769 GUIDE WIRE: HCPCS | Performed by: STUDENT IN AN ORGANIZED HEALTH CARE EDUCATION/TRAINING PROGRAM

## 2025-06-17 PROCEDURE — 2709999900 HC NON-CHARGEABLE SUPPLY: Performed by: STUDENT IN AN ORGANIZED HEALTH CARE EDUCATION/TRAINING PROGRAM

## 2025-06-17 RX ORDER — ASPIRIN 81 MG/1
81 TABLET, CHEWABLE ORAL DAILY
COMMUNITY

## 2025-06-17 RX ORDER — SODIUM CHLORIDE 9 MG/ML
INJECTION, SOLUTION INTRAVENOUS PRN
Status: DISCONTINUED | OUTPATIENT
Start: 2025-06-17 | End: 2025-06-17 | Stop reason: HOSPADM

## 2025-06-17 RX ORDER — ASPIRIN 325 MG
325 TABLET ORAL ONCE
Status: COMPLETED | OUTPATIENT
Start: 2025-06-17 | End: 2025-06-17

## 2025-06-17 RX ORDER — CARVEDILOL 6.25 MG/1
6.25 TABLET ORAL ONCE
Status: COMPLETED | OUTPATIENT
Start: 2025-06-17 | End: 2025-06-17

## 2025-06-17 RX ORDER — HEPARIN SODIUM 1000 [USP'U]/ML
INJECTION, SOLUTION INTRAVENOUS; SUBCUTANEOUS PRN
Status: DISCONTINUED | OUTPATIENT
Start: 2025-06-17 | End: 2025-06-17 | Stop reason: HOSPADM

## 2025-06-17 RX ORDER — ESZOPICLONE 2 MG/1
2 TABLET, FILM COATED ORAL NIGHTLY
COMMUNITY

## 2025-06-17 RX ORDER — FENTANYL CITRATE 50 UG/ML
INJECTION, SOLUTION INTRAMUSCULAR; INTRAVENOUS PRN
Status: DISCONTINUED | OUTPATIENT
Start: 2025-06-17 | End: 2025-06-17 | Stop reason: HOSPADM

## 2025-06-17 RX ORDER — SODIUM CHLORIDE 9 MG/ML
INJECTION, SOLUTION INTRAVENOUS CONTINUOUS
Status: DISCONTINUED | OUTPATIENT
Start: 2025-06-17 | End: 2025-06-17 | Stop reason: HOSPADM

## 2025-06-17 RX ORDER — IOPAMIDOL 755 MG/ML
INJECTION, SOLUTION INTRAVASCULAR PRN
Status: DISCONTINUED | OUTPATIENT
Start: 2025-06-17 | End: 2025-06-17 | Stop reason: HOSPADM

## 2025-06-17 RX ORDER — ACETAMINOPHEN 325 MG/1
650 TABLET ORAL EVERY 4 HOURS PRN
Status: DISCONTINUED | OUTPATIENT
Start: 2025-06-17 | End: 2025-06-17 | Stop reason: HOSPADM

## 2025-06-17 RX ORDER — SODIUM CHLORIDE 0.9 % (FLUSH) 0.9 %
5-40 SYRINGE (ML) INJECTION PRN
Status: DISCONTINUED | OUTPATIENT
Start: 2025-06-17 | End: 2025-06-17 | Stop reason: HOSPADM

## 2025-06-17 RX ORDER — SODIUM CHLORIDE 0.9 % (FLUSH) 0.9 %
5-40 SYRINGE (ML) INJECTION EVERY 12 HOURS SCHEDULED
Status: DISCONTINUED | OUTPATIENT
Start: 2025-06-17 | End: 2025-06-17 | Stop reason: HOSPADM

## 2025-06-17 RX ORDER — ROSUVASTATIN CALCIUM 10 MG/1
10 TABLET, COATED ORAL NIGHTLY
Qty: 30 TABLET | Refills: 3 | Status: SHIPPED | OUTPATIENT
Start: 2025-06-17

## 2025-06-17 RX ORDER — MIDAZOLAM HYDROCHLORIDE 1 MG/ML
INJECTION, SOLUTION INTRAMUSCULAR; INTRAVENOUS PRN
Status: DISCONTINUED | OUTPATIENT
Start: 2025-06-17 | End: 2025-06-17 | Stop reason: HOSPADM

## 2025-06-17 RX ADMIN — ASPIRIN 325 MG: 325 TABLET ORAL at 09:10

## 2025-06-17 RX ADMIN — CARVEDILOL 6.25 MG: 6.25 TABLET, FILM COATED ORAL at 10:45

## 2025-06-17 RX ADMIN — SODIUM CHLORIDE: 9 INJECTION, SOLUTION INTRAVENOUS at 10:31

## 2025-06-17 RX ADMIN — SODIUM CHLORIDE: 9 INJECTION, SOLUTION INTRAVENOUS at 09:06

## 2025-06-17 NOTE — DISCHARGE SUMMARY
DISCHARGE SUMMARY      PATIENT NAME:  Ness Castañeda    :  1950 AGE: 74 y.o.     Admission Date: 2025   Discharge Date:  25     Reason for Admission:   Active Problems:    * No active hospital problems. *  Resolved Problems:    * No resolved hospital problems. *      PROCEDURES    Left heart catheterization     BRIEF SUMMARY OF HOSPITAL COURSE    Coronary angiogram showed mild non obstructive coronary artery disease. 10% mid LAD lesion     Consulting Providers: [unfilled]    DISCHARGE INSTRUCTIONS      Activity:  No heavy lifting or driving for a week   Diet:  low sodium  Follow-up  To office in: in 2weeks       Discharge condition: good    Discharge disposition: home     DISCHARGE MEDICATIONS       Medication List        START taking these medications      rosuvastatin 10 MG tablet  Commonly known as: Crestor  Take 1 tablet by mouth nightly            CONTINUE taking these medications      acetaminophen 500 MG tablet  Commonly known as: TYLENOL     aspirin 81 MG chewable tablet     carvedilol 6.25 MG tablet  Commonly known as: COREG  Take 1 tablet by mouth 2 times daily (with meals)     docusate sodium 100 MG capsule  Commonly known as: Colace  Take 1 capsule by mouth daily as needed for Constipation     famotidine 20 MG tablet  Commonly known as: PEPCID  Take 1 tablet by mouth daily     Farxiga 10 MG tablet  Generic drug: dapagliflozin     ferrous sulfate 325 (65 Fe) MG tablet  Commonly known as: IRON 325     furosemide 20 MG tablet  Commonly known as: Lasix  Take 1 tablet by mouth daily     lisinopril 20 MG tablet  Commonly known as: PRINIVIL;ZESTRIL     Lunesta 2 MG Tabs  Generic drug: eszopiclone     VITAMIN D-3 PO               Where to Get Your Medications        These medications were sent to [facility] [id number] [place] [address] - P [phone] - F [phone]  [facility] [address] [address]      Phone: [phone]   rosuvastatin 10 MG tablet

## 2025-06-17 NOTE — H&P
Patient admitted, consent signed and questions answered. Patient ready for procedure. Call light to reach with side rails up 2 of 2. Rt. Groin clipped with writer and Nora KRUSE present.  Family at bedside with patient.  History and physical complete.

## 2025-06-17 NOTE — H&P
H&P     CHIEF COMPLAINT:  chest pain on exertion     History Obtained From:  patient    HISTORY OF PRESENT ILLNESS:      The patient is a 74 y.o. female with significant past medical history of diverticulosis.  Patient had concern of return rupture versus neoplastic lesion.  Has a history of left bundle branch block.  Patient initially seen as preop evaluation.  Patient is s/p sigmoid resection.  Post surgical course was complicated.  Required 2 weeks of hospital stay.  Echo from March 24, 2024 showed EF of 35% with moderate to severe MR.  Akinetic apical septal a apical inferior apical lateral and apex walls.    Repeat echo showed EF of 50% with continuation of apical anterior wall hypokinesis.    Baseline creatinine 1.4-1.5    Patient presented with noticing chest tightness at rest and chest tightness with exertion.      Past Medical History:    Past Medical History:   Diagnosis Date    Arthritis     Bladder tumor 12/2016    Blood in urine 12/2016    Cancer (HCC) 12/2016    BLADDER-REMOVED NO CHEMO OR RADIATION    Cancer (HCC) 12/2016    RT KIDNEY-PARTIAL REMOVAL NO CHEMO OR RADIATION    Dental crowns present     UPPER AND LOWER    Diabetes mellitus (HCC) 2011    ON ORAL MED    Diverticulitis     Hypertension 2015    ON RX STARTING 01/2017    Irregular heart beat     pt evaluated by cardiology 2/19/2025 - cleared for surgery    Kidney disease     stage 3 per pt    Prediabetes     Skin cancer     nose    Wears glasses      Past Surgical History:    Past Surgical History:   Procedure Laterality Date    BLADDER TUMOR EXCISION  01/16/2017    TUR-BT    CARDIAC CATHETERIZATION  06/17/2025    COLONOSCOPY  2006    DILATION AND CURETTAGE OF UTERUS  1995    ESOPHAGOGASTRODUODENOSCOPY      PARTIAL NEPHRECTOMY Right 02/27/2017    radical neph    PARTIAL NEPHRECTOMY Right 02/27/2017    NEPHRECTOMY PARTIAL, CONVERTED TO RADICAL SI ROBOTIC, INTRAOP ULTRASOUND  performed by Agustin Garrido MD at [facility] OR    [facility]

## 2025-08-31 ENCOUNTER — HEALTH MAINTENANCE LETTER (OUTPATIENT)
Age: 75
End: 2025-08-31

## (undated) PROCEDURE — 0DTN0ZZ RESECTION OF SIGMOID COLON, OPEN APPROACH: ICD-10-PCS

## (undated) PROCEDURE — 8E0W4CZ ROBOTIC ASSISTED PROCEDURE OF TRUNK REGION, PERCUTANEOUS ENDOSCOPIC APPROACH: ICD-10-PCS

## (undated) PROCEDURE — 3E0T3BZ INTRODUCTION OF ANESTHETIC AGENT INTO PERIPHERAL NERVES AND PLEXI, PERCUTANEOUS APPROACH: ICD-10-PCS

## (undated) DEVICE — TIP COVER ACCESSORY

## (undated) DEVICE — REDUCER: Brand: ENDOWRIST

## (undated) DEVICE — BAND COMPR L24CM REG CLR PLAS HEMSTAT EXT HK AND LOOP RETEN

## (undated) DEVICE — GLOVE SURG SZ 8 CRM LTX FREE POLYISOPRENE POLYMER BEAD ANTI

## (undated) DEVICE — STAPLER INT CUT LN 40MM STPL 51MM GRN CRV HD B FRM

## (undated) DEVICE — SYRINGE MED 20ML STD CLR PLAS LUERLOCK TIP N CTRL DISP

## (undated) DEVICE — DISCONTINUED USE 399249 CS RELOAD ECHELON WHITE 45MM

## (undated) DEVICE — AEGIS 1" DISK 4MM HOLE, PEEL OPEN: Brand: MEDLINE

## (undated) DEVICE — RADIFOCUS GLIDEWIRE: Brand: GLIDEWIRE

## (undated) DEVICE — WOUND RETRACTOR AND PROTECTOR: Brand: ALEXIS O WOUND PROTECTOR-RETRACTOR

## (undated) DEVICE — SUTURE VCRL SZ 1 L36IN ABSRB UD L36MM CT-1 1/2 CIR J947H

## (undated) DEVICE — TRI-LUMEN FILTERED TUBE SET WITH ACTIVATED CHARCOAL FILTER: Brand: AIRSEAL

## (undated) DEVICE — GLOVE SURG SZ 6 THK91MIL LTX FREE SYN POLYISOPRENE ANTI

## (undated) DEVICE — SUTURE VCRL + SZ 1 L36IN ABSRB UD L36MM CT-1 1/2 CIR VCP947H

## (undated) DEVICE — SUTURE V-LOC 180 SZ 3-0 L6IN ABSRB GRN V-20 L26MM 1/2 CIR VLOCL0604

## (undated) DEVICE — YANKAUER,POOLE TIP,STERILE,50/CS: Brand: MEDLINE

## (undated) DEVICE — SUTURE VCRL SZ 2-0 L27IN ABSRB UD L26MM SH 1/2 CIR J417H

## (undated) DEVICE — APPLIER SUT CLP FOR 2-0 3-0 4-0 VCRL ABSRB SUT

## (undated) DEVICE — Z DUP USE 2641840 CLIP INT L POLYMER LOK LIG HEM O LOK

## (undated) DEVICE — STAPLER INT L28CM DIA29MM CLS STPL H10-2.5MM OPN LEG L5.5MM

## (undated) DEVICE — Device

## (undated) DEVICE — RESERVOIR,SUCTION,100CC,SILICONE: Brand: MEDLINE

## (undated) DEVICE — PAD PT POS 36 IN SURGYPAD DISP

## (undated) DEVICE — DEVICE TRCR 12X9X3IN WHT CLSR DISP OMNICLOSE

## (undated) DEVICE — TROCAR: Brand: KII® OPTICAL ACCESS SYSTEM

## (undated) DEVICE — KENDALL SCD EXPRESS SLEEVES, KNEE LENGTH, MEDIUM: Brand: KENDALL SCD

## (undated) DEVICE — SKIN PREP TRAY 4 COMPARTM TRAY: Brand: MEDLINE INDUSTRIES, INC.

## (undated) DEVICE — GAUZE,PACKING STRIP,IODOFORM,1/2"X5YD,ST: Brand: CURAD

## (undated) DEVICE — STAZ ROBOT: Brand: MEDLINE INDUSTRIES, INC.

## (undated) DEVICE — ANGIOGRAPHIC CATHETER: Brand: IMPULSE™

## (undated) DEVICE — BAG SPEC LAP H9IN DIA75IN 1500ML 10 12MM CANN ATTCH MEM

## (undated) DEVICE — ELECTRODE PT RET AD L9FT HI MOIST COND ADH HYDRGEL CORDED

## (undated) DEVICE — GUIDEWIRE 35/260/FC/PTFE/3J: Brand: GUIDEWIRE

## (undated) DEVICE — CATHETER ANGIO 6FR L100CM DIA0.056IN FR4 CRV VASC ACCS EXPO

## (undated) DEVICE — ARM DRAPE

## (undated) DEVICE — SUTURE VICRYL + SZ 3-0 L27IN ABSRB UD L26MM SH 1/2 CIR VCP416H

## (undated) DEVICE — SUTURE PDS II SZ 0 L36IN ABSRB VLT L36MM CT-1 1/2 CIR Z346H

## (undated) DEVICE — SEAL

## (undated) DEVICE — SYRINGE MED 10ML LUERLOCK TIP W/O SFTY DISP

## (undated) DEVICE — INSUFFLATION NEEDLE TO ESTABLISH PNEUMOPERITONEUM.: Brand: INSUFFLATION NEEDLE

## (undated) DEVICE — LEGGINGS, PAIR, 33X51 XL, STERILE: Brand: MEDLINE

## (undated) DEVICE — PAD,ABDOMINAL,5"X9",ST,LF,25/BX: Brand: MEDLINE INDUSTRIES, INC.

## (undated) DEVICE — DECANTER BAG 9": Brand: MEDLINE INDUSTRIES, INC.

## (undated) DEVICE — SYRINGE IRRIG 60ML SFT PLIABLE BLB EZ TO GRP 1 HND USE W/

## (undated) DEVICE — SOLUTION IRRIG 1000ML 0.9% SOD CHL USP POUR PLAS BTL

## (undated) DEVICE — 450 ML BOTTLE OF 0.05% CHLORHEXIDINE GLUCONATE IN 99.95% STERILE WATER FOR IRRIGATION, USP AND APPLICATOR.: Brand: IRRISEPT ANTIMICROBIAL WOUND LAVAGE

## (undated) DEVICE — SUTURE PROL SZ 1 L30IN NONABSORBABLE BLU CTX L48MM 1/2 CIR 8455H

## (undated) DEVICE — SUTURE MCRYL SZ 4-0 L18IN ABSRB UD L16MM PC-3 3/8 CIR PRIM Y845G

## (undated) DEVICE — TOWEL,OR,DSP,ST,BLUE,DLX,XR,4/PK,20PK/CS: Brand: MEDLINE

## (undated) DEVICE — GLOVE SURG SZ 75 CRM LTX FREE POLYISOPRENE POLYMER BEAD ANTI

## (undated) DEVICE — SUTURE PROL SZ 2-0 L30IN NONABSORBABLE BLU L26MM SH 1/2 CIR 8833H

## (undated) DEVICE — SPONGE,PEANUT,XRAY,ST,SM,3/8",5/CARD: Brand: MEDLINE INDUSTRIES, INC.

## (undated) DEVICE — BLADELESS OBTURATOR: Brand: WECK VISTA

## (undated) DEVICE — DRAIN SURG W10XL20CM SIL SMOOTH FLAT 3/4 PERF DBL WRP

## (undated) DEVICE — GOWN,AURORA,NONREINFORCED,LARGE: Brand: MEDLINE

## (undated) DEVICE — BINDER ABD UNISX 3 PNL E PREM CNTCT CLSR L XL 46INX62INX9IN

## (undated) DEVICE — SUTURE PROL SZ 4-0 L30IN NONABSORBABLE BLU SH L26MM 1/2 CIR 8831H

## (undated) DEVICE — STAPLER INT L75MM CUT LN L73MM STPL LN L77MM BLU B FRM 8

## (undated) DEVICE — 1LYRTR 16FR10ML 100%SILI SNAP: Brand: MEDLINE INDUSTRIES, INC.

## (undated) DEVICE — SUTURE VICRYL + SZ 4-0 L27IN ABSRB UD PS-2 3/8 CIR REV CUT VCP426H

## (undated) DEVICE — ANGIOGRAPHIC CATHETER: Brand: EXPO™

## (undated) DEVICE — SUTURE PROL SZ 1 L30IN NONABSORBABLE BLU L36MM CT-1 1/2 CIR 8425H

## (undated) DEVICE — GLOVE ORANGE PI 7 1/2   MSG9075

## (undated) DEVICE — AIRSEAL 8 MM ACCESS PORT AND LOW PROFILE OBTURATOR WITH BLADELESS OPTICAL TIP, 120 MM LENGTH: Brand: AIRSEAL

## (undated) DEVICE — SUTURE ETHILON SZ 3-0 L18IN NONABSORBABLE BLK PS-2 L19MM 3/8 1669H

## (undated) DEVICE — TRAY SURG CUST CRD CATH TOLEDO

## (undated) DEVICE — CHLORAPREP 26ML ORANGE

## (undated) DEVICE — SEALER ENDOSCP NANO COAT OPN DIV CRV L JAW LIGASURE IMPACT

## (undated) DEVICE — SUTURE VCRL + SZ 2-0 L27IN ABSRB WHT SH 1/2 CIR TAPERCUT VCP417H

## (undated) DEVICE — JELLY,LUBE,STERILE,FLIP TOP,TUBE,2-OZ: Brand: MEDLINE

## (undated) DEVICE — GLOVE ORANGE PI 7   MSG9070

## (undated) DEVICE — BLADE ES L6IN ELASTOMERIC COAT EXT DURABLE BEND UPTO 90DEG

## (undated) DEVICE — COVER LT HNDL BLU PLAS

## (undated) DEVICE — TUBING, SUCTION, 1/4" X 12', STRAIGHT: Brand: MEDLINE

## (undated) DEVICE — SUTURE PDS II SZ 0 L27IN ABSRB VLT UR-6 L26MM 1/2 CIR D7185

## (undated) DEVICE — BASIC SINGLE BASIN 1-LF: Brand: MEDLINE INDUSTRIES, INC.

## (undated) DEVICE — GLIDESHEATH SLENDER STAINLESS STEEL KIT: Brand: GLIDESHEATH SLENDER

## (undated) DEVICE — TOWEL SURG W16XL26IN WHT NONFENESTRATED ST 4 PER PK

## (undated) DEVICE — 4-PORT MANIFOLD: Brand: NEPTUNE 2

## (undated) DEVICE — GAUZE,SPONGE,FLUFF,6"X6.75",STRL,5/TRAY: Brand: MEDLINE

## (undated) DEVICE — SOLUTION PREP PAINT POV IOD FOR SKIN MUCOUS MEM

## (undated) RX ORDER — LIDOCAINE HYDROCHLORIDE 10 MG/ML
INJECTION, SOLUTION EPIDURAL; INFILTRATION; INTRACAUDAL; PERINEURAL
Status: DISPENSED
Start: 2023-12-06

## (undated) RX ORDER — LIDOCAINE 40 MG/G
CREAM TOPICAL
Status: DISPENSED
Start: 2023-11-29